# Patient Record
Sex: FEMALE | Race: OTHER | HISPANIC OR LATINO | Employment: FULL TIME | URBAN - NONMETROPOLITAN AREA
[De-identification: names, ages, dates, MRNs, and addresses within clinical notes are randomized per-mention and may not be internally consistent; named-entity substitution may affect disease eponyms.]

---

## 2023-08-30 PROBLEM — D25.9 FIBROID, UTERINE: Status: ACTIVE | Noted: 2023-08-30

## 2023-08-30 PROBLEM — R92.8 ABNORMALITY OF LEFT BREAST ON SCREENING MAMMOGRAM: Status: ACTIVE | Noted: 2023-08-30

## 2023-08-30 PROBLEM — K76.9 LIVER LESION: Status: ACTIVE | Noted: 2021-03-19

## 2023-08-30 PROBLEM — R20.2 PARESTHESIA OF UPPER EXTREMITY: Status: ACTIVE | Noted: 2023-08-30

## 2023-08-30 PROBLEM — C7A.8 NEUROENDOCRINE CANCER (MULTI): Status: ACTIVE | Noted: 2023-08-30

## 2023-08-30 PROBLEM — Z98.890 H/O ABDOMINOPLASTY: Status: ACTIVE | Noted: 2021-03-19

## 2023-08-30 PROBLEM — M50.20 HERNIATED CERVICAL DISC: Status: ACTIVE | Noted: 2023-08-30

## 2023-08-30 PROBLEM — Q84.5: Status: ACTIVE | Noted: 2023-08-30

## 2023-08-30 PROBLEM — R63.5 ABNORMAL WEIGHT GAIN: Status: ACTIVE | Noted: 2023-08-30

## 2023-08-30 PROBLEM — E89.89 POSTOPERATIVE HYPOVOLEMIA: Status: ACTIVE | Noted: 2023-08-30

## 2023-08-30 PROBLEM — C7B.8 METASTATIC MALIGNANT NEUROENDOCRINE TUMOR TO LIVER (MULTI): Status: ACTIVE | Noted: 2023-08-30

## 2023-08-30 PROBLEM — Z98.1 S/P CERVICAL SPINAL FUSION: Status: ACTIVE | Noted: 2023-08-30

## 2023-08-30 PROBLEM — G89.29 CHRONIC RIGHT UPPER QUADRANT PAIN: Status: ACTIVE | Noted: 2020-07-06

## 2023-08-30 PROBLEM — R63.0 LOSS OF APPETITE: Status: ACTIVE | Noted: 2021-03-19

## 2023-08-30 PROBLEM — N17.9 AKI (ACUTE KIDNEY INJURY) (CMS-HCC): Status: ACTIVE | Noted: 2023-08-30

## 2023-08-30 PROBLEM — K91.89 BILE LEAK, POSTOPERATIVE: Status: ACTIVE | Noted: 2023-08-30

## 2023-08-30 PROBLEM — D62 POSTOPERATIVE ANEMIA DUE TO ACUTE BLOOD LOSS: Status: ACTIVE | Noted: 2023-08-30

## 2023-08-30 PROBLEM — Z85.89 HISTORY OF NEUROENDOCRINE CANCER: Status: ACTIVE | Noted: 2023-08-30

## 2023-08-30 PROBLEM — C75.9 ENDOCRINE CANCER (MULTI): Status: ACTIVE | Noted: 2023-08-30

## 2023-08-30 PROBLEM — R10.9 ABDOMINAL PAIN: Status: ACTIVE | Noted: 2023-08-30

## 2023-08-30 PROBLEM — R73.09 ABNORMAL GLUCOSE: Status: ACTIVE | Noted: 2023-08-30

## 2023-08-30 PROBLEM — R16.0 LIVER MASS: Status: ACTIVE | Noted: 2023-08-30

## 2023-08-30 PROBLEM — R63.5 WEIGHT INCREASE: Status: ACTIVE | Noted: 2023-08-30

## 2023-08-30 PROBLEM — E86.1 POSTOPERATIVE HYPOVOLEMIA: Status: ACTIVE | Noted: 2023-08-30

## 2023-08-30 PROBLEM — M54.10 RADICULOPATHY: Status: ACTIVE | Noted: 2023-08-30

## 2023-08-30 PROBLEM — R94.6 ABNORMAL THYROID FUNCTION TEST: Status: ACTIVE | Noted: 2023-08-30

## 2023-08-30 PROBLEM — R10.11 CHRONIC RIGHT UPPER QUADRANT PAIN: Status: ACTIVE | Noted: 2020-07-06

## 2023-08-30 PROBLEM — K83.8 BILE LEAK, POSTOPERATIVE: Status: ACTIVE | Noted: 2023-08-30

## 2023-08-30 PROBLEM — C44.92 SCC (SQUAMOUS CELL CARCINOMA): Status: ACTIVE | Noted: 2023-08-30

## 2023-08-30 RX ORDER — LANREOTIDE ACETATE 120 MG/.5ML
INJECTION SUBCUTANEOUS
COMMUNITY
End: 2024-05-14 | Stop reason: WASHOUT

## 2023-08-30 RX ORDER — IBUPROFEN 200 MG
1 TABLET ORAL 3 TIMES DAILY PRN
COMMUNITY

## 2023-08-30 RX ORDER — FUROSEMIDE 20 MG/1
1 TABLET ORAL DAILY
COMMUNITY
Start: 2022-02-09 | End: 2023-10-20 | Stop reason: SDUPTHER

## 2023-08-30 RX ORDER — AMOXICILLIN 250 MG
CAPSULE ORAL
COMMUNITY
Start: 2021-12-08 | End: 2023-12-04 | Stop reason: WASHOUT

## 2023-08-30 RX ORDER — ONDANSETRON 4 MG/1
TABLET, FILM COATED ORAL
COMMUNITY
Start: 2021-01-19 | End: 2023-10-12 | Stop reason: SDUPTHER

## 2023-08-30 RX ORDER — FEXOFENADINE HCL AND PSEUDOEPHEDRINE HCI 60; 120 MG/1; MG/1
1 TABLET, EXTENDED RELEASE ORAL AS NEEDED
COMMUNITY
End: 2023-12-04 | Stop reason: WASHOUT

## 2023-08-30 RX ORDER — POLYETHYLENE GLYCOL 3350 17 G/17G
POWDER, FOR SOLUTION ORAL DAILY PRN
COMMUNITY
Start: 2021-06-24 | End: 2023-12-04 | Stop reason: WASHOUT

## 2023-08-30 RX ORDER — ALBUTEROL SULFATE 90 UG/1
AEROSOL, METERED RESPIRATORY (INHALATION)
COMMUNITY
Start: 2021-04-09 | End: 2023-12-04 | Stop reason: WASHOUT

## 2023-09-26 DIAGNOSIS — C7A.8 NEUROENDOCRINE MALIGNANCY (MULTI): Primary | ICD-10-CM

## 2023-09-26 RX ORDER — LANREOTIDE ACETATE 120 MG/.5ML
120 INJECTION SUBCUTANEOUS
OUTPATIENT
Start: 2023-10-06 | End: 2024-09-06

## 2023-10-02 DIAGNOSIS — C7A.8 NEUROENDOCRINE CANCER (MULTI): Primary | ICD-10-CM

## 2023-10-02 RX ORDER — ALBUTEROL SULFATE 0.83 MG/ML
3 SOLUTION RESPIRATORY (INHALATION) AS NEEDED
Status: CANCELLED | OUTPATIENT
Start: 2023-10-06

## 2023-10-02 RX ORDER — FAMOTIDINE 10 MG/ML
20 INJECTION INTRAVENOUS ONCE AS NEEDED
Status: CANCELLED | OUTPATIENT
Start: 2023-10-06

## 2023-10-02 RX ORDER — DIPHENHYDRAMINE HYDROCHLORIDE 50 MG/ML
50 INJECTION INTRAMUSCULAR; INTRAVENOUS AS NEEDED
Status: CANCELLED | OUTPATIENT
Start: 2023-10-06

## 2023-10-02 RX ORDER — EPINEPHRINE 0.3 MG/.3ML
0.3 INJECTION SUBCUTANEOUS EVERY 5 MIN PRN
Status: CANCELLED | OUTPATIENT
Start: 2023-10-06

## 2023-10-02 RX ORDER — LANREOTIDE ACETATE 120 MG/.5ML
120 INJECTION SUBCUTANEOUS ONCE
Status: CANCELLED | OUTPATIENT
Start: 2023-10-06

## 2023-10-06 ENCOUNTER — INFUSION (OUTPATIENT)
Dept: HEMATOLOGY/ONCOLOGY | Facility: HOSPITAL | Age: 43
End: 2023-10-06
Payer: COMMERCIAL

## 2023-10-06 ENCOUNTER — LAB (OUTPATIENT)
Dept: LAB | Facility: LAB | Age: 43
End: 2023-10-06
Payer: COMMERCIAL

## 2023-10-06 VITALS
OXYGEN SATURATION: 97 % | SYSTOLIC BLOOD PRESSURE: 123 MMHG | HEIGHT: 67 IN | RESPIRATION RATE: 18 BRPM | DIASTOLIC BLOOD PRESSURE: 83 MMHG | TEMPERATURE: 97.5 F | WEIGHT: 222.88 LBS | HEART RATE: 72 BPM | BODY MASS INDEX: 34.98 KG/M2

## 2023-10-06 DIAGNOSIS — C7A.8 NEUROENDOCRINE CANCER (MULTI): ICD-10-CM

## 2023-10-06 DIAGNOSIS — C7A.8 OTHER MALIGNANT NEUROENDOCRINE TUMORS (MULTI): Primary | ICD-10-CM

## 2023-10-06 LAB
ALBUMIN SERPL BCP-MCNC: 4.1 G/DL (ref 3.4–5)
ALP SERPL-CCNC: 73 U/L (ref 33–110)
ALT SERPL W P-5'-P-CCNC: 19 U/L (ref 7–45)
ANION GAP SERPL CALC-SCNC: 11 MMOL/L (ref 10–20)
AST SERPL W P-5'-P-CCNC: 19 U/L (ref 9–39)
BASOPHILS # BLD AUTO: 0.06 X10*3/UL (ref 0–0.1)
BASOPHILS NFR BLD AUTO: 0.8 %
BILIRUB SERPL-MCNC: 0.4 MG/DL (ref 0–1.2)
BUN SERPL-MCNC: 15 MG/DL (ref 6–23)
CALCIUM SERPL-MCNC: 9.1 MG/DL (ref 8.6–10.3)
CHLORIDE SERPL-SCNC: 105 MMOL/L (ref 98–107)
CO2 SERPL-SCNC: 25 MMOL/L (ref 21–32)
CREAT SERPL-MCNC: 0.77 MG/DL (ref 0.5–1.05)
EOSINOPHIL # BLD AUTO: 0.15 X10*3/UL (ref 0–0.7)
EOSINOPHIL NFR BLD AUTO: 2 %
ERYTHROCYTE [DISTWIDTH] IN BLOOD BY AUTOMATED COUNT: 12.9 % (ref 11.5–14.5)
GFR SERPL CREATININE-BSD FRML MDRD: >90 ML/MIN/1.73M*2
GLUCOSE SERPL-MCNC: 112 MG/DL (ref 74–99)
HCT VFR BLD AUTO: 44.1 % (ref 36–46)
HGB BLD-MCNC: 14.6 G/DL (ref 12–16)
IMM GRANULOCYTES # BLD AUTO: 0.02 X10*3/UL (ref 0–0.7)
IMM GRANULOCYTES NFR BLD AUTO: 0.3 % (ref 0–0.9)
LYMPHOCYTES # BLD AUTO: 2.53 X10*3/UL (ref 1.2–4.8)
LYMPHOCYTES NFR BLD AUTO: 33.8 %
MCH RBC QN AUTO: 30.4 PG (ref 26–34)
MCHC RBC AUTO-ENTMCNC: 33.1 G/DL (ref 32–36)
MCV RBC AUTO: 92 FL (ref 80–100)
MONOCYTES # BLD AUTO: 0.56 X10*3/UL (ref 0.1–1)
MONOCYTES NFR BLD AUTO: 7.5 %
NEUTROPHILS # BLD AUTO: 4.16 X10*3/UL (ref 1.2–7.7)
NEUTROPHILS NFR BLD AUTO: 55.6 %
NRBC BLD-RTO: 0 /100 WBCS (ref 0–0)
PLATELET # BLD AUTO: 229 X10*3/UL (ref 150–450)
PMV BLD AUTO: 11.1 FL (ref 7.5–11.5)
POTASSIUM SERPL-SCNC: 4.1 MMOL/L (ref 3.5–5.3)
PROT SERPL-MCNC: 7.3 G/DL (ref 6.4–8.2)
RBC # BLD AUTO: 4.8 X10*6/UL (ref 4–5.2)
SODIUM SERPL-SCNC: 137 MMOL/L (ref 136–145)
WBC # BLD AUTO: 7.5 X10*3/UL (ref 4.4–11.3)

## 2023-10-06 PROCEDURE — 96372 THER/PROPH/DIAG INJ SC/IM: CPT

## 2023-10-06 PROCEDURE — 36415 COLL VENOUS BLD VENIPUNCTURE: CPT

## 2023-10-06 PROCEDURE — 85025 COMPLETE CBC W/AUTO DIFF WBC: CPT

## 2023-10-06 PROCEDURE — 2500000004 HC RX 250 GENERAL PHARMACY W/ HCPCS (ALT 636 FOR OP/ED): Mod: JZ | Performed by: NURSE PRACTITIONER

## 2023-10-06 PROCEDURE — 80053 COMPREHEN METABOLIC PANEL: CPT

## 2023-10-06 PROCEDURE — 83497 ASSAY OF 5-HIAA: CPT

## 2023-10-06 RX ORDER — EPINEPHRINE 0.3 MG/.3ML
0.3 INJECTION SUBCUTANEOUS EVERY 5 MIN PRN
Status: CANCELLED | OUTPATIENT
Start: 2023-11-03

## 2023-10-06 RX ORDER — LANREOTIDE ACETATE 120 MG/.5ML
120 INJECTION SUBCUTANEOUS ONCE
Status: CANCELLED | OUTPATIENT
Start: 2023-11-03

## 2023-10-06 RX ORDER — FAMOTIDINE 10 MG/ML
20 INJECTION INTRAVENOUS ONCE AS NEEDED
Status: CANCELLED | OUTPATIENT
Start: 2023-11-03

## 2023-10-06 RX ORDER — DIPHENHYDRAMINE HYDROCHLORIDE 50 MG/ML
50 INJECTION INTRAMUSCULAR; INTRAVENOUS AS NEEDED
Status: CANCELLED | OUTPATIENT
Start: 2023-11-03

## 2023-10-06 RX ORDER — LANREOTIDE ACETATE 120 MG/.5ML
120 INJECTION SUBCUTANEOUS ONCE
Status: COMPLETED | OUTPATIENT
Start: 2023-10-06 | End: 2023-10-06

## 2023-10-06 RX ORDER — ALBUTEROL SULFATE 0.83 MG/ML
3 SOLUTION RESPIRATORY (INHALATION) AS NEEDED
Status: CANCELLED | OUTPATIENT
Start: 2023-11-03

## 2023-10-06 RX ADMIN — LANREOTIDE ACETATE 120 MG: 120 INJECTION SUBCUTANEOUS at 12:01

## 2023-10-06 ASSESSMENT — PAIN SCALES - GENERAL: PAINLEVEL: 7

## 2023-10-12 ENCOUNTER — TELEPHONE (OUTPATIENT)
Dept: HEMATOLOGY/ONCOLOGY | Facility: HOSPITAL | Age: 43
End: 2023-10-12
Payer: COMMERCIAL

## 2023-10-12 DIAGNOSIS — C7A.8 NEUROENDOCRINE CANCER (MULTI): Primary | ICD-10-CM

## 2023-10-12 RX ORDER — ONDANSETRON 4 MG/1
8 TABLET, FILM COATED ORAL EVERY 8 HOURS PRN
Qty: 20 TABLET | Refills: 3 | Status: SHIPPED | OUTPATIENT
Start: 2023-10-12 | End: 2023-11-11

## 2023-10-14 ENCOUNTER — HOSPITAL ENCOUNTER (OUTPATIENT)
Dept: RADIOLOGY | Facility: HOSPITAL | Age: 43
Discharge: HOME | End: 2023-10-14
Payer: COMMERCIAL

## 2023-10-14 DIAGNOSIS — C7A.8 OTHER MALIGNANT NEUROENDOCRINE TUMORS (MULTI): ICD-10-CM

## 2023-10-14 PROCEDURE — 74183 MRI ABD W/O CNTR FLWD CNTR: CPT | Performed by: STUDENT IN AN ORGANIZED HEALTH CARE EDUCATION/TRAINING PROGRAM

## 2023-10-14 PROCEDURE — 72197 MRI PELVIS W/O & W/DYE: CPT

## 2023-10-14 PROCEDURE — 74183 MRI ABD W/O CNTR FLWD CNTR: CPT

## 2023-10-14 PROCEDURE — 2550000001 HC RX 255 CONTRASTS: Performed by: NURSE PRACTITIONER

## 2023-10-14 PROCEDURE — 72197 MRI PELVIS W/O & W/DYE: CPT | Performed by: STUDENT IN AN ORGANIZED HEALTH CARE EDUCATION/TRAINING PROGRAM

## 2023-10-14 PROCEDURE — A9575 INJ GADOTERATE MEGLUMI 0.1ML: HCPCS | Performed by: NURSE PRACTITIONER

## 2023-10-14 RX ORDER — GADOTERATE MEGLUMINE 376.9 MG/ML
20 INJECTION INTRAVENOUS
Status: COMPLETED | OUTPATIENT
Start: 2023-10-14 | End: 2023-10-14

## 2023-10-14 RX ADMIN — GADOTERATE MEGLUMINE 20 ML: 376.9 INJECTION INTRAVENOUS at 11:03

## 2023-10-17 LAB — 5OH-INDOLEACETATE SERPL-MCNC: 6.2 NG/ML

## 2023-10-20 ENCOUNTER — OFFICE VISIT (OUTPATIENT)
Dept: HEMATOLOGY/ONCOLOGY | Facility: HOSPITAL | Age: 43
End: 2023-10-20
Payer: COMMERCIAL

## 2023-10-20 VITALS
SYSTOLIC BLOOD PRESSURE: 124 MMHG | OXYGEN SATURATION: 99 % | TEMPERATURE: 96.6 F | DIASTOLIC BLOOD PRESSURE: 80 MMHG | HEART RATE: 79 BPM | BODY MASS INDEX: 35.99 KG/M2 | RESPIRATION RATE: 18 BRPM | WEIGHT: 223 LBS

## 2023-10-20 DIAGNOSIS — C7B.8 METASTATIC MALIGNANT NEUROENDOCRINE TUMOR TO LIVER (MULTI): Primary | ICD-10-CM

## 2023-10-20 DIAGNOSIS — R60.0 BILATERAL LEG EDEMA: ICD-10-CM

## 2023-10-20 DIAGNOSIS — C7A.8 NEUROENDOCRINE CANCER (MULTI): Primary | ICD-10-CM

## 2023-10-20 PROCEDURE — 99215 OFFICE O/P EST HI 40 MIN: CPT | Performed by: NURSE PRACTITIONER

## 2023-10-20 PROCEDURE — 1036F TOBACCO NON-USER: CPT | Performed by: NURSE PRACTITIONER

## 2023-10-20 RX ORDER — FUROSEMIDE 20 MG/1
20 TABLET ORAL DAILY PRN
Qty: 30 TABLET | Refills: 0 | Status: SHIPPED | OUTPATIENT
Start: 2023-10-20 | End: 2024-01-05

## 2023-10-20 ASSESSMENT — PAIN SCALES - GENERAL: PAINLEVEL: 0-NO PAIN

## 2023-10-20 NOTE — PROGRESS NOTES
Patient ID: Brooke Davenport is a 43 y.o. female.  Visit type: Follow up visit      Current Therapy    Treatment Plan:  Lanreotide, Every 28 Days      ONCOLOGIC HISTORY    42-year-old woman with a several year history of neck pain on the Rt side and for exploration they did full imaging include CT neck, chest and AP on 06/30/2020  which showed a large lobulated heterogeneously enhancing mass lesion involving segments 7 and 8 of the right hepatic lobe measuring approximately 13.2 x 10.5 x 9.7 cm in size. 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   MRI showed large hypervascular heterogeneously enhancing hepatic mass lesion in segment 7 and 8 and multiple other satellite hepatic lesions involving right and left hepatic lobes. Stable subcentimeter zayda hepatis lymph nodes. No other significant lymphadenopathy  or free intraperitoneal fluid.  PET-NET showed Dominant centrally necrotic somatostatin receptor expressing mass within hepatic segments 7 and 8. Additional satellite lesions in the bilateral hepatic lobes as described above. Somatostatin avid Left internal iliac lymph nodes. No additional  sites of abnormal Ga-68 dotatate uptake suggestive of malignancy identified.  PET-FDG didn't show any extra glucose uptake  Colonoscopy and SB enteroscopy didn't show any primary lesion. Symptoms of diarrhea and intermittent flushing s/p 1 dose of lanreotide after which she had severe bloating and abdominal pain as well as diarrhea   10/27: She had partial hepatectomy (segment 3, 5, 6), liver ablation (segment 5, 3), cholecystectomy   She recovered well with surgery. She was tested positive for COVID  and only symptoms she has was loss of taste and stuffy nose.   She started on lanreotide but d/t multiple side effects including gas, bloating, and some hypoglycemia episodes she was switched  to Octreotide 30 mg in December of 2021  MRI abd/pelvis 1/7/2021 appears stable but limited imaging d/t inability to do with contrast as IV infiltrated during scan per patient.     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 female who presents for follow up and to review scans. She reports ongoing swelling in her legs and has not taken lasix in a while. She is requesting a refill of lasix. She states her headaches have improved since getting her wisdom teeth extracted and takes allegra as needed. She has noticed increased nausea that is managed with zofran, no emesis. Her appetite is not great but she is eating enough to maintain her weight. She has intermittent diarrhea and formed stool. She denies pain, no fever or chills, no shortness of breath or chest pain. She is concerned about stable lesion noted on left side of S1 vertebral body as it was not previously mentioned.    Review of Systems   All other systems reviewed and are negative.      OBJECTIVE:    VS:  There were no vitals taken for this visit.  Weight: There were no vitals filed for this visit.    BSA: There is no height or weight on file to calculate BSA.    Physical Exam  Vitals reviewed.   Constitutional:       General: She is not in acute distress.     Appearance: Normal appearance.   HENT:      Head: Normocephalic and atraumatic.      Nose: Nose normal.      Mouth/Throat:      Mouth: Mucous membranes are moist.      Pharynx: Oropharynx is clear.   Eyes:      Extraocular Movements: Extraocular movements intact.      Conjunctiva/sclera: Conjunctivae normal.   Cardiovascular:      Rate and Rhythm: Normal rate and regular rhythm.      Pulses: Normal pulses.      Heart sounds: Normal heart sounds.   Pulmonary:      Effort: Pulmonary effort is normal.      Breath sounds: Normal breath sounds.   Abdominal:      General:  Abdomen is flat. Bowel sounds are normal.      Palpations: Abdomen is soft.   Musculoskeletal:         General: Swelling present. Normal range of motion.      Cervical back: Normal range of motion and neck supple.      Comments: +1-2 pitting edema to bilateral legs   Skin:     General: Skin is warm and dry.   Neurological:      General: No focal deficit present.      Mental Status: She is alert and oriented to person, place, and time. Mental status is at baseline.   Psychiatric:         Mood and Affect: Mood normal.         Behavior: Behavior normal.         Thought Content: Thought content normal.         Judgment: Judgment normal.           Diagnostic Results     Lab on 10/06/2023   Component Date Value    5-Hydroxyindoleacetic Ac* 10/06/2023 6.2     WBC 10/06/2023 7.5     nRBC 10/06/2023 0.0     RBC 10/06/2023 4.80     Hemoglobin 10/06/2023 14.6     Hematocrit 10/06/2023 44.1     MCV 10/06/2023 92     MCH 10/06/2023 30.4     MCHC 10/06/2023 33.1     RDW 10/06/2023 12.9     Platelets 10/06/2023 229     MPV 10/06/2023 11.1     Neutrophils % 10/06/2023 55.6     Immature Granulocytes %,* 10/06/2023 0.3     Lymphocytes % 10/06/2023 33.8     Monocytes % 10/06/2023 7.5     Eosinophils % 10/06/2023 2.0     Basophils % 10/06/2023 0.8     Neutrophils Absolute 10/06/2023 4.16     Immature Granulocytes Ab* 10/06/2023 0.02     Lymphocytes Absolute 10/06/2023 2.53     Monocytes Absolute 10/06/2023 0.56     Eosinophils Absolute 10/06/2023 0.15     Basophils Absolute 10/06/2023 0.06     Glucose 10/06/2023 112 (H)     Sodium 10/06/2023 137     Potassium 10/06/2023 4.1     Chloride 10/06/2023 105     Bicarbonate 10/06/2023 25     Anion Gap 10/06/2023 11     Urea Nitrogen 10/06/2023 15     Creatinine 10/06/2023 0.77     eGFR 10/06/2023 >90     Calcium 10/06/2023 9.1     Albumin 10/06/2023 4.1     Alkaline Phosphatase 10/06/2023 73     Total Protein 10/06/2023 7.3     AST 10/06/2023 19     Bilirubin, Total 10/06/2023 0.4     ALT  10/06/2023 19            MR pelvis w and wo IV contrast  Narrative: Interpreted By:  Geraldo Obando,   STUDY:  MR PELVIS W AND WO IV CONTRAST;  10/14/2023 10:59 am      INDICATION:  Signs/Symptoms: Neuroendocrine neoplasm on treatment, restaging scans  Other malignant neuroendocrine tumors C7A.8: Neuroendocrine cancer.      COMPARISON:  Pelvis MRI dated 07/03/2023      ACCESSION NUMBER(S):  [id number]      ORDERING CLINICIAN:  SKYLA GIMENEZ      TECHNIQUE:  Multiplanar MRI of the pelvis was obtained including the following  sequences: T2 weighted SSFSE, T2 FSE with and without fat saturation,  pre and post gadolinium dynamic T1 GRE.  20 ml of Gadolinium contrast agent Dotarem were administered  intravenously without complication.      FINDINGS:  RECTOSIGMOID AND ANAL CANAL:  The anal canal appears normal without evidence of fistula or masses.      BLADDER:  Within normal limits      REPRODUCTIVE ORGANS:  Uterus is unremarkable      PELVIC LYMPH NODES:  No enlarged pelvic lymph nodes.      PERITONEUM/RETROPERITONEUM:  Trace pelvic ascites.      BONES AND PELVIC WALL:  No destructive osseous lesions. Stable indeterminate faint T2  hypointense enhancing lesion in the left side of S1 vertebral body  measuring 1.4 cm (series 10, image 43/series 24, image 22) gluteal  injection granulomas noted.      Impression: 1. No metastatic disease in the pelvis.  2. Stable indeterminate enhancing focus in the left side of S1  vertebral body, stable since at least 01/07/2022 and favors benign  given long-term stability.  3. Stable trace pelvic ascites.          MACRO:  None      Signed by: Geraldo Obando 10/16/2023 7:56 AM  Dictation workstation:   [id number]  MR abdomen w and wo IV contrast  Narrative: Interpreted By:  Geraldo Obando,   STUDY:  MR ABDOMEN W AND WO IV CONTRAST;  10/14/2023 10:59 am      INDICATION:  Signs/Symptoms: Neuroendocrine neoplasm on treatment, restaging scans  Other malignant neuroendocrine tumors C7A.8:  Neuroendocrine cancer.      COMPARISON:  Abdomen MRI dated 07/03/2023      ACCESSION NUMBER(S):  [id number]      ORDERING CLINICIAN:  SKYLA GIMENEZ      TECHNIQUE:  MRI LIVER; Multiplanar magnetic resonance images of the abdomen were  obtained including the following sequences; T2-weighted SSFSE with  and without fat saturation, T1-weighted GRE in/opposed phase, DWI,  fat saturated 3D-T1w GRE pre and dynamically post contrast.  20 ml of  Gadolinium contrast agent Dotarem were administered intravenously  without immediate complication.      FINDINGS:  LIVER:  Status post partial right hepatectomy. Stable small cystic focus of  the liver dome. Stable few arterial enhancing foci, subcapsular  segment 8 measuring 1 cm (image 10), segment 7 measuring 1 cm (image  8) and centrally in segment 5 measuring 0.8 cm (image 24)      BILE DUCTS:  No intra or extrahepatic biliary dilatation.      GALLBLADDER:  Surgically absent      PANCREAS:  Normal signal intensity. Normal enhancement. No masses. The  pancreatic duct is normal.      SPLEEN:  The spleen is normal in size without evidence of focal lesions.      ADRENAL GLANDS:  No nodules.      KIDNEYS:  Both kidneys are normal in size and cortical enhancement. Stable left  cortical simple cysts. No solid masses.      LYMPH NODES:  No enlarged lymphadenopathy.      ABDOMINAL VESSELS:  Aorta and the major abdominal arterial vessels demonstrate no gross  abnormality.  Superior mesenteric vein, splenic vein, and main, right  and left portal vein are patent. Hepatic veins are patent.      BOWEL:  No bowel dilatation.      PERITONEUM/RETROPERITONEUM:  No ascites. Bilateral gluteal injection granulomas.      BONES AND LOWER THORAX:  No destructive osseous lesions. The visualized lower lung fields are  unremarkable. The heart is normal in size.      Impression: 1. Status post partial right hepatectomy. Stable indeterminate  hepatic arterial enhancing foci as detailed above. No new  suspicious  lesions.          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 The possibility of origin in pancreas and duodenum should be excluded.   MRI showed large hypervascular heterogeneously enhancing hepatic mass lesion in segment 7 and 8 and multiple other satellite hepatic lesions involving right and left hepatic lobes. Stable subcentimeter zayda hepatis lymph nodes. No other significant lymphadenopathy  or free intraperitoneal fluid.  PET-NET showed Dominant centrally necrotic somatostatin receptor expressing mass within hepatic segments 7 and 8. Additional satellite lesions in the bilateral hepatic lobes as described above. Somatostatin avid Left internal iliac lymph nodes. No additional  sites of abnormal Ga-68 dotatate uptake suggestive of malignancy identified.  PET-FDG didnt show any extra glucose uptake  Colonoscopy and SB enteroscopy didn't show any primary lesion  10/27/2020: She had partial hepatectomy (segment 3, 5, 6), liver ablation (segment 5, 3), cholecystectomy and pth confirmed G2 well diff NET (kI67 3.1%)    She recovered well with surgery. She was tested positive for COVID  and only symptoms she has was loss of taste and stuffy nose.   04/26/2021: Restaging CT scan showed post surgical changes and postablation changes in the liver with no new lesions  MRI  "showed At least 4 foci of restricted diffusion are identified in segments 8 and 3 without correlating abnormality on other sequences. However 2 of these foci correlate with hypervascular foci and a prior CT scan.  Findings are nonspecific, however  metastatic foci cannot be excluded  MRI showed stable disease on 08/16/2021  PET-Ga68 on 11/2021 showed multiple small (3-4) somatostatin avid foci are noted scattered throughout the hepatic  parenchyma which appear stable number and relative intensity compared to the prior exam   Octreotide 30 mg started 12/2021 as she was not tolerating lanreotide well with multiple side effects.  MRI Brain 12/13 done for recurrent headaches/ to rule out mets- Scan WNL  MRI abd/pelvis 1/7/2021 appears stable but limited imaging d/t inability to do with contrast as IV infiltrated during scan per patient.   Repeated MRI on 03/2022: Showed stable disease with one new liver lesion   Repeated MRI on 07/23/2022: Showed stable disease   PET scan on 10/14/2022 shows stable disease  MRI AP on 3/6/2023 shows stable disease. 5HIAA 3.3 on 3/29/23  MRI AP on 7/3/2023 shows stable disease  MRI AP on 10/14/23 shows: \"Status post partial right hepatectomy. Stable indeterminate  hepatic arterial enhancing foci as detailed above. No new suspicious lesions.\"     Plan:    -Patient has no  new complaints  -Reviewed scan results with patient which shows SD  -Email sent to radiology Geraldo Perez for clarification on S1 vertebral lesion seen on scan  -Continue monthly SSA with lanreotide  -Repeat MRI AP ordered in 3 months (ordered)  -Return to clinic after scans for follow-up and to discuss results       LE edema:  -7/21/23 Echo with no concerning findings, she will follow up with cardiology   -Refill for lasix 20 mg daily as needed for swelling sent to pharmacy    Headaches: Improved  -7/21/23 MRI Brain with no metastatic disease, she will follow up with neurology for frequent headaches      Eva MOON" Masha, APRN-CNP  10/20/23

## 2023-11-03 ENCOUNTER — TELEPHONE (OUTPATIENT)
Dept: HEMATOLOGY/ONCOLOGY | Facility: HOSPITAL | Age: 43
End: 2023-11-03
Payer: COMMERCIAL

## 2023-11-03 ENCOUNTER — INFUSION (OUTPATIENT)
Dept: HEMATOLOGY/ONCOLOGY | Facility: HOSPITAL | Age: 43
End: 2023-11-03
Payer: COMMERCIAL

## 2023-11-03 VITALS
HEART RATE: 79 BPM | OXYGEN SATURATION: 97 % | TEMPERATURE: 97.9 F | DIASTOLIC BLOOD PRESSURE: 85 MMHG | SYSTOLIC BLOOD PRESSURE: 130 MMHG | RESPIRATION RATE: 17 BRPM

## 2023-11-03 DIAGNOSIS — C7A.8 NEUROENDOCRINE CANCER (MULTI): ICD-10-CM

## 2023-11-03 PROCEDURE — 2500000004 HC RX 250 GENERAL PHARMACY W/ HCPCS (ALT 636 FOR OP/ED): Mod: JZ | Performed by: NURSE PRACTITIONER

## 2023-11-03 PROCEDURE — 96372 THER/PROPH/DIAG INJ SC/IM: CPT

## 2023-11-03 RX ORDER — LANREOTIDE ACETATE 120 MG/.5ML
120 INJECTION SUBCUTANEOUS ONCE
Status: COMPLETED | OUTPATIENT
Start: 2023-11-03 | End: 2023-11-03

## 2023-11-03 RX ORDER — LANREOTIDE ACETATE 120 MG/.5ML
120 INJECTION SUBCUTANEOUS ONCE
Status: CANCELLED | OUTPATIENT
Start: 2023-12-01

## 2023-11-03 RX ORDER — EPINEPHRINE 0.3 MG/.3ML
0.3 INJECTION SUBCUTANEOUS EVERY 5 MIN PRN
Status: CANCELLED | OUTPATIENT
Start: 2023-12-01

## 2023-11-03 RX ORDER — ALBUTEROL SULFATE 0.83 MG/ML
3 SOLUTION RESPIRATORY (INHALATION) AS NEEDED
Status: CANCELLED | OUTPATIENT
Start: 2023-12-01

## 2023-11-03 RX ORDER — DIPHENHYDRAMINE HYDROCHLORIDE 50 MG/ML
50 INJECTION INTRAMUSCULAR; INTRAVENOUS AS NEEDED
Status: CANCELLED | OUTPATIENT
Start: 2023-12-01

## 2023-11-03 RX ORDER — FAMOTIDINE 10 MG/ML
20 INJECTION INTRAVENOUS ONCE AS NEEDED
Status: CANCELLED | OUTPATIENT
Start: 2023-12-01

## 2023-11-03 RX ADMIN — LANREOTIDE ACETATE 120 MG: 120 INJECTION SUBCUTANEOUS at 12:18

## 2023-11-03 ASSESSMENT — PAIN SCALES - GENERAL: PAINLEVEL: 0-NO PAIN

## 2023-11-03 NOTE — TELEPHONE ENCOUNTER
Patient scheduled for injection later today at 3 PM. Patient called to request a sooner appointment. Patient is taking a half day and would like to come sooner. Moved patient to 12:30 PM. Patient verbalized understanding and agreed to time change.

## 2023-11-28 ENCOUNTER — TELEPHONE (OUTPATIENT)
Dept: PRIMARY CARE | Facility: CLINIC | Age: 43
End: 2023-11-28

## 2023-12-01 ENCOUNTER — APPOINTMENT (OUTPATIENT)
Dept: HEMATOLOGY/ONCOLOGY | Facility: CLINIC | Age: 43
End: 2023-12-01
Payer: COMMERCIAL

## 2023-12-01 ENCOUNTER — INFUSION (OUTPATIENT)
Dept: HEMATOLOGY/ONCOLOGY | Facility: HOSPITAL | Age: 43
End: 2023-12-01
Payer: COMMERCIAL

## 2023-12-01 VITALS
DIASTOLIC BLOOD PRESSURE: 77 MMHG | OXYGEN SATURATION: 96 % | TEMPERATURE: 99.1 F | RESPIRATION RATE: 18 BRPM | SYSTOLIC BLOOD PRESSURE: 119 MMHG | HEART RATE: 93 BPM

## 2023-12-01 DIAGNOSIS — C7A.8 NEUROENDOCRINE CANCER (MULTI): ICD-10-CM

## 2023-12-01 PROCEDURE — 96372 THER/PROPH/DIAG INJ SC/IM: CPT

## 2023-12-01 PROCEDURE — 2500000004 HC RX 250 GENERAL PHARMACY W/ HCPCS (ALT 636 FOR OP/ED): Mod: JZ | Performed by: NURSE PRACTITIONER

## 2023-12-01 RX ORDER — FAMOTIDINE 10 MG/ML
20 INJECTION INTRAVENOUS ONCE AS NEEDED
Status: CANCELLED | OUTPATIENT
Start: 2023-12-29

## 2023-12-01 RX ORDER — LANREOTIDE ACETATE 120 MG/.5ML
120 INJECTION SUBCUTANEOUS ONCE
Status: CANCELLED | OUTPATIENT
Start: 2023-12-29

## 2023-12-01 RX ORDER — ALBUTEROL SULFATE 0.83 MG/ML
3 SOLUTION RESPIRATORY (INHALATION) AS NEEDED
Status: CANCELLED | OUTPATIENT
Start: 2023-12-29

## 2023-12-01 RX ORDER — DIPHENHYDRAMINE HYDROCHLORIDE 50 MG/ML
50 INJECTION INTRAMUSCULAR; INTRAVENOUS AS NEEDED
Status: CANCELLED | OUTPATIENT
Start: 2023-12-29

## 2023-12-01 RX ORDER — LANREOTIDE ACETATE 120 MG/.5ML
120 INJECTION SUBCUTANEOUS ONCE
Status: COMPLETED | OUTPATIENT
Start: 2023-12-01 | End: 2023-12-01

## 2023-12-01 RX ORDER — EPINEPHRINE 0.3 MG/.3ML
0.3 INJECTION SUBCUTANEOUS EVERY 5 MIN PRN
Status: CANCELLED | OUTPATIENT
Start: 2023-12-29

## 2023-12-01 RX ADMIN — LANREOTIDE ACETATE 120 MG: 120 INJECTION SUBCUTANEOUS at 14:58

## 2023-12-01 ASSESSMENT — COLUMBIA-SUICIDE SEVERITY RATING SCALE - C-SSRS
6. HAVE YOU EVER DONE ANYTHING, STARTED TO DO ANYTHING, OR PREPARED TO DO ANYTHING TO END YOUR LIFE?: NO
1. IN THE PAST MONTH, HAVE YOU WISHED YOU WERE DEAD OR WISHED YOU COULD GO TO SLEEP AND NOT WAKE UP?: NO
2. HAVE YOU ACTUALLY HAD ANY THOUGHTS OF KILLING YOURSELF?: NO

## 2023-12-01 ASSESSMENT — PATIENT HEALTH QUESTIONNAIRE - PHQ9
1. LITTLE INTEREST OR PLEASURE IN DOING THINGS: NOT AT ALL
2. FEELING DOWN, DEPRESSED OR HOPELESS: NOT AT ALL
SUM OF ALL RESPONSES TO PHQ9 QUESTIONS 1 & 2: 0

## 2023-12-01 ASSESSMENT — PAIN SCALES - GENERAL: PAINLEVEL: 0-NO PAIN

## 2023-12-04 ENCOUNTER — TELEMEDICINE (OUTPATIENT)
Dept: PRIMARY CARE | Facility: CLINIC | Age: 43
End: 2023-12-04
Payer: COMMERCIAL

## 2023-12-04 DIAGNOSIS — R19.7 DIARRHEA, UNSPECIFIED TYPE: Primary | ICD-10-CM

## 2023-12-04 PROBLEM — I72.9 ANEURYSM (CMS-HCC): Status: ACTIVE | Noted: 2023-12-04

## 2023-12-04 PROCEDURE — 99213 OFFICE O/P EST LOW 20 MIN: CPT | Performed by: NURSE PRACTITIONER

## 2023-12-04 ASSESSMENT — PAIN SCALES - GENERAL: PAINLEVEL: 8

## 2023-12-04 NOTE — PROGRESS NOTES
With patient's permission this is a telemedicine visit with video and audio.  History Of Present Illness  Brooke Chu is a 43 y.o. female who calls in for Nausea (Dr Reid- stomach virus x 3 days-[phone]/Has been having nausea, diarrhea - orange yellow, now black/green, vomiting, weak, burping- had odor/Has been taking Pepto, has been trying to increase liquid intake.).    HPI  43 year old female pt of Dr. Reid very ill for last three days, having diarrhea that is green and black, burping a lot, she threw up phlegm yesterday.  Only eating toast and pretzels.  Gets injections for her neuroendocrine cancer every 28 days.  Not seeing blood in her stool.  States there was a big change in the stool color as of yesterday, she is going to try one day of BRAT diet and if no change in color of stool will call oncologist tomorrow, she is due for her next scan in January.      Past Medical History  She has a past medical history of Other bursal cyst, unspecified wrist.    Medications  Current Outpatient Medications   Medication Instructions    albuterol 90 mcg/actuation inhaler inhalation    furosemide (LASIX) 20 mg, oral, Daily PRN    ibuprofen 200 mg tablet 1 tablet, oral, 3 times daily PRN, with food or milk    lanreotide (Somatuline Depot) 120 mg/0.5 mL syringe subcutaneous        Surgical History  She has a past surgical history that includes Other surgical history (06/29/2020); Other surgical history (03/21/2022); Other surgical history (08/24/2022); Other surgical history (08/24/2022); Other surgical history (11/14/2020); Other surgical history (11/14/2020); Other surgical history (11/14/2020); US guided needle liver biopsy (7/23/2020); and CT guided percutaneous peritoneal or retroperitoneal fluid collection drainage (11/3/2020).     Social History  She reports that she has never smoked. She has never been exposed to tobacco smoke. She has never used smokeless tobacco. She reports that she does not drink  alcohol and does not use drugs.    Family History  Family History   Problem Relation Name Age of Onset    Other (adult respiratory distress sydrome) Mother      Other (cardiac disorder) Mother      COPD Mother      Crohn's disease Mother      Hypothyroidism Mother      Hyperthyroidism Mother      Heart attack Mother      Cerebral aneurysm Father      Hyperthyroidism Other Grandmother     Hypothyroidism Other Grandmother     Diabetes Other Grandfather     Diabetes Other Aunt     Hyperthyroidism Other Aunt     Hypothyroidism Other Aunt     Diabetes Paternal Great-Grandmother          Allergies  Methylprednisolone and Other    On video:     Appearance: Normal appearance.      Effort: Respiratory effort is normal. Speaking in full sentences. No respiratory distress.     Mood and Affect: Mood normal.         Thought Content: Thought content normal.         Judgment: Judgment normal.      Last Recorded Vitals  There were no vitals taken for this visit.  (Vitals are taken by patient at home, if any reported)    Relevant Results      Assessment/Plan   Brooke was seen today for nausea.  Diagnoses and all orders for this visit:  Diarrhea, unspecified type (Primary)          Cori Gonzalez, APRN-CNP

## 2023-12-29 ENCOUNTER — INFUSION (OUTPATIENT)
Dept: HEMATOLOGY/ONCOLOGY | Facility: HOSPITAL | Age: 43
End: 2023-12-29
Payer: COMMERCIAL

## 2023-12-29 VITALS
WEIGHT: 221.56 LBS | HEART RATE: 90 BPM | OXYGEN SATURATION: 99 % | BODY MASS INDEX: 35.76 KG/M2 | RESPIRATION RATE: 18 BRPM | SYSTOLIC BLOOD PRESSURE: 146 MMHG | TEMPERATURE: 98.4 F | DIASTOLIC BLOOD PRESSURE: 94 MMHG

## 2023-12-29 DIAGNOSIS — C7A.8 NEUROENDOCRINE CANCER (MULTI): ICD-10-CM

## 2023-12-29 PROCEDURE — 2500000004 HC RX 250 GENERAL PHARMACY W/ HCPCS (ALT 636 FOR OP/ED): Mod: JZ | Performed by: NURSE PRACTITIONER

## 2023-12-29 PROCEDURE — 96372 THER/PROPH/DIAG INJ SC/IM: CPT

## 2023-12-29 RX ORDER — EPINEPHRINE 0.3 MG/.3ML
0.3 INJECTION SUBCUTANEOUS EVERY 5 MIN PRN
Status: CANCELLED | OUTPATIENT
Start: 2024-01-26

## 2023-12-29 RX ORDER — DIPHENHYDRAMINE HYDROCHLORIDE 50 MG/ML
50 INJECTION INTRAMUSCULAR; INTRAVENOUS AS NEEDED
Status: DISCONTINUED | OUTPATIENT
Start: 2023-12-29 | End: 2023-12-29 | Stop reason: HOSPADM

## 2023-12-29 RX ORDER — LANREOTIDE ACETATE 120 MG/.5ML
120 INJECTION SUBCUTANEOUS ONCE
Status: CANCELLED | OUTPATIENT
Start: 2024-01-26

## 2023-12-29 RX ORDER — ALBUTEROL SULFATE 0.83 MG/ML
3 SOLUTION RESPIRATORY (INHALATION) AS NEEDED
Status: DISCONTINUED | OUTPATIENT
Start: 2023-12-29 | End: 2023-12-29 | Stop reason: HOSPADM

## 2023-12-29 RX ORDER — EPINEPHRINE 0.3 MG/.3ML
0.3 INJECTION SUBCUTANEOUS EVERY 5 MIN PRN
Status: DISCONTINUED | OUTPATIENT
Start: 2023-12-29 | End: 2023-12-29 | Stop reason: HOSPADM

## 2023-12-29 RX ORDER — FAMOTIDINE 10 MG/ML
20 INJECTION INTRAVENOUS ONCE AS NEEDED
Status: CANCELLED | OUTPATIENT
Start: 2024-01-26

## 2023-12-29 RX ORDER — LANREOTIDE ACETATE 120 MG/.5ML
120 INJECTION SUBCUTANEOUS ONCE
Status: COMPLETED | OUTPATIENT
Start: 2023-12-29 | End: 2023-12-29

## 2023-12-29 RX ORDER — FAMOTIDINE 10 MG/ML
20 INJECTION INTRAVENOUS ONCE AS NEEDED
Status: DISCONTINUED | OUTPATIENT
Start: 2023-12-29 | End: 2023-12-29 | Stop reason: HOSPADM

## 2023-12-29 RX ORDER — DIPHENHYDRAMINE HYDROCHLORIDE 50 MG/ML
50 INJECTION INTRAMUSCULAR; INTRAVENOUS AS NEEDED
Status: CANCELLED | OUTPATIENT
Start: 2024-01-26

## 2023-12-29 RX ORDER — ALBUTEROL SULFATE 0.83 MG/ML
3 SOLUTION RESPIRATORY (INHALATION) AS NEEDED
Status: CANCELLED | OUTPATIENT
Start: 2024-01-26

## 2023-12-29 RX ADMIN — LANREOTIDE ACETATE 120 MG: 120 INJECTION SUBCUTANEOUS at 13:39

## 2023-12-29 ASSESSMENT — PATIENT HEALTH QUESTIONNAIRE - PHQ9
SUM OF ALL RESPONSES TO PHQ9 QUESTIONS 1 & 2: 0
1. LITTLE INTEREST OR PLEASURE IN DOING THINGS: NOT AT ALL
2. FEELING DOWN, DEPRESSED OR HOPELESS: NOT AT ALL

## 2023-12-29 ASSESSMENT — PAIN SCALES - GENERAL: PAINLEVEL: 0-NO PAIN

## 2024-01-02 ENCOUNTER — HOSPITAL ENCOUNTER (OUTPATIENT)
Dept: RADIOLOGY | Facility: HOSPITAL | Age: 44
Discharge: HOME | End: 2024-01-02
Payer: COMMERCIAL

## 2024-01-02 DIAGNOSIS — C7A.8 NEUROENDOCRINE CANCER (MULTI): ICD-10-CM

## 2024-01-02 PROCEDURE — 74183 MRI ABD W/O CNTR FLWD CNTR: CPT

## 2024-01-02 PROCEDURE — 72197 MRI PELVIS W/O & W/DYE: CPT | Performed by: RADIOLOGY

## 2024-01-02 PROCEDURE — 74183 MRI ABD W/O CNTR FLWD CNTR: CPT | Performed by: RADIOLOGY

## 2024-01-02 PROCEDURE — 2550000001 HC RX 255 CONTRASTS: Performed by: NURSE PRACTITIONER

## 2024-01-02 PROCEDURE — A9575 INJ GADOTERATE MEGLUMI 0.1ML: HCPCS | Performed by: NURSE PRACTITIONER

## 2024-01-02 PROCEDURE — 72197 MRI PELVIS W/O & W/DYE: CPT

## 2024-01-02 RX ORDER — GADOTERATE MEGLUMINE 376.9 MG/ML
0.2 INJECTION INTRAVENOUS
Status: COMPLETED | OUTPATIENT
Start: 2024-01-02 | End: 2024-01-02

## 2024-01-02 RX ADMIN — GADOTERATE MEGLUMINE 20 ML: 376.9 INJECTION INTRAVENOUS at 08:15

## 2024-01-05 ENCOUNTER — TELEMEDICINE (OUTPATIENT)
Dept: PRIMARY CARE | Facility: CLINIC | Age: 44
End: 2024-01-05
Payer: COMMERCIAL

## 2024-01-05 VITALS — WEIGHT: 220 LBS | BODY MASS INDEX: 35.36 KG/M2 | HEIGHT: 66 IN

## 2024-01-05 DIAGNOSIS — J01.40 ACUTE NON-RECURRENT PANSINUSITIS: Primary | ICD-10-CM

## 2024-01-05 PROBLEM — R73.09 ABNORMAL GLUCOSE: Status: RESOLVED | Noted: 2023-08-30 | Resolved: 2024-01-05

## 2024-01-05 PROBLEM — R92.8 ABNORMALITY OF LEFT BREAST ON SCREENING MAMMOGRAM: Status: RESOLVED | Noted: 2023-08-30 | Resolved: 2024-01-05

## 2024-01-05 PROBLEM — R63.5 ABNORMAL WEIGHT GAIN: Status: RESOLVED | Noted: 2023-08-30 | Resolved: 2024-01-05

## 2024-01-05 PROBLEM — K76.9 LIVER LESION: Status: RESOLVED | Noted: 2021-03-19 | Resolved: 2024-01-05

## 2024-01-05 PROBLEM — R63.0 LOSS OF APPETITE: Status: RESOLVED | Noted: 2021-03-19 | Resolved: 2024-01-05

## 2024-01-05 PROBLEM — R10.9 ABDOMINAL PAIN: Status: RESOLVED | Noted: 2023-08-30 | Resolved: 2024-01-05

## 2024-01-05 PROBLEM — R94.6 ABNORMAL THYROID FUNCTION TEST: Status: RESOLVED | Noted: 2023-08-30 | Resolved: 2024-01-05

## 2024-01-05 PROBLEM — R63.5 WEIGHT INCREASE: Status: RESOLVED | Noted: 2023-08-30 | Resolved: 2024-01-05

## 2024-01-05 PROCEDURE — 99214 OFFICE O/P EST MOD 30 MIN: CPT | Performed by: FAMILY MEDICINE

## 2024-01-05 RX ORDER — AMOXICILLIN AND CLAVULANATE POTASSIUM 875; 125 MG/1; MG/1
875 TABLET, FILM COATED ORAL 2 TIMES DAILY
Qty: 14 TABLET | Refills: 0 | Status: SHIPPED | OUTPATIENT
Start: 2024-01-05 | End: 2024-01-12

## 2024-01-05 RX ORDER — BENZONATATE 100 MG/1
100 CAPSULE ORAL 3 TIMES DAILY PRN
Qty: 21 CAPSULE | Refills: 0 | Status: SHIPPED | OUTPATIENT
Start: 2024-01-05 | End: 2024-01-12

## 2024-01-05 ASSESSMENT — LIFESTYLE VARIABLES
SKIP TO QUESTIONS 9-10: 1
HOW MANY STANDARD DRINKS CONTAINING ALCOHOL DO YOU HAVE ON A TYPICAL DAY: 1 OR 2
HOW OFTEN DO YOU HAVE SIX OR MORE DRINKS ON ONE OCCASION: NEVER
HOW OFTEN DO YOU HAVE A DRINK CONTAINING ALCOHOL: MONTHLY OR LESS
AUDIT-C TOTAL SCORE: 1

## 2024-01-05 ASSESSMENT — PATIENT HEALTH QUESTIONNAIRE - PHQ9
1. LITTLE INTEREST OR PLEASURE IN DOING THINGS: NOT AT ALL
SUM OF ALL RESPONSES TO PHQ9 QUESTIONS 1 AND 2: 0
2. FEELING DOWN, DEPRESSED OR HOPELESS: NOT AT ALL

## 2024-01-05 ASSESSMENT — PAIN SCALES - GENERAL: PAINLEVEL: 0-NO PAIN

## 2024-01-05 ASSESSMENT — ENCOUNTER SYMPTOMS
LOSS OF SENSATION IN FEET: 0
DEPRESSION: 0
OCCASIONAL FEELINGS OF UNSTEADINESS: 0

## 2024-01-05 NOTE — ASSESSMENT & PLAN NOTE
- Given your symptoms and duration of illness, we feel that you can benefit from antibiotic coverage at this time  - A prescription for Augmentin was sent to your pharmacy, please take this medication as prescribed  - Recommend supportive care with increased fluid intake to thin secretions, Ibuprofen or Tylenol as needed for pain or fever, and steamy showers/saline nasal rinses to help clear the nasal passages  - continue with Flonase nasal spray  -As there is a slight element of potential bronchitis, I have additionally sent Tessalon perles to help with cough  - You may consider tea with honey or a cinnamon stick as these have natural antiviral and antibiotic properties  - Call if symptoms worsen or do not improve with these treatments.

## 2024-01-05 NOTE — PATIENT INSTRUCTIONS
Problem List Items Addressed This Visit             ICD-10-CM    Acute non-recurrent pansinusitis - Primary J01.40     - Given your symptoms and duration of illness, we feel that you can benefit from antibiotic coverage at this time  - A prescription for Augmentin was sent to your pharmacy, please take this medication as prescribed  - Recommend supportive care with increased fluid intake to thin secretions, Ibuprofen or Tylenol as needed for pain or fever, and steamy showers/saline nasal rinses to help clear the nasal passages  - continue with Flonase nasal spray  -As there is a slight element of potential bronchitis, I have additionally sent Tessalon perles to help with cough  - You may consider tea with honey or a cinnamon stick as these have natural antiviral and antibiotic properties  - Call if symptoms worsen or do not improve with these treatments.         Relevant Medications    amoxicillin-pot clavulanate (Augmentin) 875-125 mg tablet    benzonatate (Tessalon) 100 mg capsule

## 2024-01-05 NOTE — PROGRESS NOTES
Outpatient Visit Note    Chief Complaint   Patient presents with    Nasal Congestion     Sinus congestion [id number]; pt states she has been taking otc meds.       With patient's permission, this is a Telemedicine visit with video and audio. The provider and patient participated in this telemedicine encounter.    HPI:  Brooke Chu is a 43 y.o. female with PMH significant for unspecified neuroendocrine cancer followed by Oncology at  who presents to the office via telemedicine encounter secondary to complaints of upper respiratory infection.  She was last seen via telemedicine encounter by Heather Gonzalez on 12//23 secondary to acute diarrhea.  Prior to this she had been seen for sinus infection in June which was successfully treated with Augmentin           She reports persistent and progressive sinus pressure with upper respiratory congestion and cough for the last 6 weeks.  Admits to associated sinus congestion/drainage, ear pain and headaches.  Symptoms have been more prominent on the right side, reporting mildly productive cough with noted postnasal drainage. Throat has become more sore over the last few days along with pressure/discomfort in right ear.  Over the course of the last 1 to 2 weeks, symptoms have created increased drainage with chest congestion and mildly productive cough.  Has been attempting Flonase and Tylenol for mild symptom relief. Denies any fever, chills, chest pain, shortness a breath, wheeze, abdominal pain, nausea, vomiting or diarrhea.          Current Medications  Current Outpatient Medications   Medication Instructions    amoxicillin-pot clavulanate (Augmentin) 875-125 mg tablet 875 mg, oral, 2 times daily    benzonatate (TESSALON) 100 mg, oral, 3 times daily PRN, Do not crush or chew.    ibuprofen 200 mg tablet 1 tablet, oral, 3 times daily PRN, with food or milk    lanreotide (Somatuline Depot) 120 mg/0.5 mL syringe subcutaneous        Allergies  Allergies   Allergen Reactions     Methylprednisolone Other and Unknown     Abdominal pain    Other Unknown     steroids        Past Medical History:   Diagnosis Date    Other bursal cyst, unspecified wrist     Synovial cyst of wrist      Past Surgical History:   Procedure Laterality Date    CT GUIDED PERCUTANEOUS PERITONEAL OR RETROPERITONEAL FLUID COLLECTION DRAINAGE  11/3/2020    CT GUIDED PERCUTANEOUS PERITONEAL OR RETROPERITONEAL FLUID COLLECTION DRAINAGE 11/3/2020 [facility] [place] INPATIENT LEGACY    OTHER SURGICAL HISTORY  06/29/2020    Wrist surgery    OTHER SURGICAL HISTORY  03/21/2022    Tubal ligation bilateral    OTHER SURGICAL HISTORY  08/24/2022    Neck surgery    OTHER SURGICAL HISTORY  08/24/2022    Gallbladder surgery    OTHER SURGICAL HISTORY  11/14/2020    Hepatectomy partial    OTHER SURGICAL HISTORY  11/14/2020    Liver tumor ablation    OTHER SURGICAL HISTORY  11/14/2020    Cholecystectomy    US GUIDED NEEDLE LIVER BIOPSY  7/23/2020    US GUIDED NEEDLE LIVER BIOPSY 7/23/2020 [facility] [place] AIB LEGACY     Family History   Problem Relation Name Age of Onset    Other (adult respiratory distress sydrome) Mother      Other (cardiac disorder) Mother      COPD Mother      Crohn's disease Mother      Hypothyroidism Mother      Hyperthyroidism Mother      Heart attack Mother      Cerebral aneurysm Father      Hyperthyroidism Other Grandmother     Hypothyroidism Other Grandmother     Diabetes Other Grandfather     Diabetes Other Aunt     Hyperthyroidism Other Aunt     Hypothyroidism Other Aunt     Diabetes Paternal Great-Grandmother       Social History     Tobacco Use    Smoking status: Never     Passive exposure: Never    Smokeless tobacco: Never   Vaping Use    Vaping Use: Never used   Substance Use Topics    Alcohol use: Never    Drug use: Never     Tobacco Use: Low Risk  (1/5/2024)    Patient History     Smoking Tobacco Use: Never     Smokeless Tobacco Use: Never     Passive Exposure: Never        ROS  All pertinent positive symptoms are included in the  history of present illness.  All other systems have been reviewed and are negative and noncontributory to this patient's current ailments.    VITAL SIGNS  Vitals:    01/05/24 1113   Weight: 99.8 kg (220 lb)      Body mass index is 35.51 kg/m².   Patient is unable to provide    PHYSICAL EXAM  GENERAL APPEARANCE:  Alert and oriented x 3, Pleasant and cooperative, No acute distress.   LUNGS:  No conversational dyspnea or cough during encounter.   PSYCH:  appropriate mood and affect, no difficulty with speech.   Telemedicine visit, no other exam component done.      Assessment/Plan   Problem List Items Addressed This Visit             ICD-10-CM    Acute non-recurrent pansinusitis - Primary J01.40     - Given your symptoms and duration of illness, we feel that you can benefit from antibiotic coverage at this time  - A prescription for Augmentin was sent to your pharmacy, please take this medication as prescribed  - Recommend supportive care with increased fluid intake to thin secretions, Ibuprofen or Tylenol as needed for pain or fever, and steamy showers/saline nasal rinses to help clear the nasal passages  - continue with Flonase nasal spray  -As there is a slight element of potential bronchitis, I have additionally sent Tessalon perles to help with cough  - You may consider tea with honey or a cinnamon stick as these have natural antiviral and antibiotic properties  - Call if symptoms worsen or do not improve with these treatments.         Relevant Medications    amoxicillin-pot clavulanate (Augmentin) 875-125 mg tablet    benzonatate (Tessalon) 100 mg capsule

## 2024-01-06 ENCOUNTER — APPOINTMENT (OUTPATIENT)
Dept: RADIOLOGY | Facility: HOSPITAL | Age: 44
End: 2024-01-06
Payer: COMMERCIAL

## 2024-01-18 NOTE — PROGRESS NOTES
Patient ID: Brooke Chu is a 44 y.o. female.  Visit type: Follow up visit      Current Therapy  Treatment Plan:  Lanreotide, Every 28 Days      ONCOLOGIC HISTORY    42-year-old woman with a several year history of neck pain on the Rt side and for exploration they did full imaging include CT neck, chest and AP on 06/30/2020  which showed a large lobulated heterogeneously enhancing mass lesion involving segments 7 and 8 of the right hepatic lobe measuring approximately 13.2 x 10.5 x 9.7 cm in size. 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   MRI showed large hypervascular heterogeneously enhancing hepatic mass lesion in segment 7 and 8 and multiple other satellite hepatic lesions involving right and left hepatic lobes. Stable subcentimeter zayda hepatis lymph nodes. No other significant lymphadenopathy  or free intraperitoneal fluid.  PET-NET showed Dominant centrally necrotic somatostatin receptor expressing mass within hepatic segments 7 and 8. Additional satellite lesions in the bilateral hepatic lobes as described above. Somatostatin avid Left internal iliac lymph nodes. No additional  sites of abnormal Ga-68 dotatate uptake suggestive of malignancy identified.  PET-FDG didn't show any extra glucose uptake  Colonoscopy and SB enteroscopy didn't show any primary lesion. Symptoms of diarrhea and intermittent flushing s/p 1 dose of lanreotide after which she had severe bloating and abdominal pain as well as diarrhea   10/27: She had partial hepatectomy (segment 3, 5, 6), liver ablation (segment 5, 3), cholecystectomy   She recovered well with surgery. She was tested positive for COVID  and only symptoms she has was loss of taste and stuffy nose.   She started on lanreotide but d/t multiple side effects including gas, bloating, and some hypoglycemia episodes she was switched to  Octreotide 30 mg in December of 2021  MRI abd/pelvis 1/7/2021 appears stable but limited imaging d/t inability to do with contrast as IV infiltrated during scan per patient.     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 female who presents for follow up and to review scans. She has occasional abdominal cramping. She reports ongoing swelling in her legs managed with lasix. She has nausea that is managed with zofran, no emesis. She has intermittent diarrhea and formed stool. She denies fever or chills, no shortness of breath or chest pain.   She has episodes of her blood glucose dropping suddenly, would like another referral to endocrinology.    Review of Systems   All other systems reviewed and are negative.      OBJECTIVE:    VS:  LMP 01/02/2024 (Exact Date)   Weight: There were no vitals filed for this visit.    BSA: There is no height or weight on file to calculate BSA.    Physical Exam  Vitals reviewed.   Constitutional:       General: She is not in acute distress.     Appearance: Normal appearance.   HENT:      Head: Normocephalic.   Pulmonary:      Effort: Pulmonary effort is normal.   Neurological:      General: No focal deficit present.      Mental Status: She is alert and oriented to person, place, and time. Mental status is at baseline.   Psychiatric:         Mood and Affect: Mood normal.         Behavior: Behavior normal.         Thought Content: Thought content normal.         Judgment: Judgment normal.           Diagnostic Results     Lab on 10/06/2023   Component Date Value    5-Hydroxyindoleacetic Ac* 10/06/2023 6.2     WBC 10/06/2023 7.5     nRBC 10/06/2023 0.0     RBC 10/06/2023 4.80     Hemoglobin 10/06/2023 14.6     Hematocrit 10/06/2023 44.1     MCV 10/06/2023 92     MCH 10/06/2023 30.4     MCHC 10/06/2023 33.1     RDW 10/06/2023 12.9     Platelets 10/06/2023 229      MPV 10/06/2023 11.1     Neutrophils % 10/06/2023 55.6     Immature Granulocytes %,* 10/06/2023 0.3     Lymphocytes % 10/06/2023 33.8     Monocytes % 10/06/2023 7.5     Eosinophils % 10/06/2023 2.0     Basophils % 10/06/2023 0.8     Neutrophils Absolute 10/06/2023 4.16     Immature Granulocytes Ab* 10/06/2023 0.02     Lymphocytes Absolute 10/06/2023 2.53     Monocytes Absolute 10/06/2023 0.56     Eosinophils Absolute 10/06/2023 0.15     Basophils Absolute 10/06/2023 0.06     Glucose 10/06/2023 112 (H)     Sodium 10/06/2023 137     Potassium 10/06/2023 4.1     Chloride 10/06/2023 105     Bicarbonate 10/06/2023 25     Anion Gap 10/06/2023 11     Urea Nitrogen 10/06/2023 15     Creatinine 10/06/2023 0.77     eGFR 10/06/2023 >90     Calcium 10/06/2023 9.1     Albumin 10/06/2023 4.1     Alkaline Phosphatase 10/06/2023 73     Total Protein 10/06/2023 7.3     AST 10/06/2023 19     Bilirubin, Total 10/06/2023 0.4     ALT 10/06/2023 19            MR pelvis w and wo IV contrast, MR abdomen w and wo IV contrast  Narrative: Interpreted By:  Luna Benites,   STUDY:  MR PELVIS W AND WO IV CONTRAST; MR ABDOMEN W AND WO IV CONTRAST;  1/2/2024 8:29 am      INDICATION:  Signs/Symptoms:Neuroendocrine carcinoma on treatment, restaging scans.      COMPARISON:  10/14/2023      ACCESSION NUMBER(S):  [id number]; [id number]      ORDERING CLINICIAN:  SKYLA GIMENEZ      TECHNIQUE:  Multiplanar multisequence T1 and T2 weighted images obtained through  the pelvis as well as the abdomen with and without 20 mL of Dotarem  intravenous contrast.      FINDINGS:  Lower Chest: Clear.      Liver: Partial right hepatectomy. Stable small cyst in the dome of  the liver. There is an 8 mm arterially enhancing lesion in the right  hepatic lobe (series 28, image 33), few additional arterially  enhancing foci in the dome of the liver measuring between 6 mm and 8  mm. No associated signal abnormality on anatomic sequences or other  postcontrast phases of  imaging.      Gallbladder and Biliary: Status post cholecystectomy.      Pancreas: No abnormality identified in the pancreas.      Spleen: No abnormality identified in the spleen.      Adrenals: No abnormality identified in either adrenal gland.      Urinary: Small left renal cysts. Small area of lower pole scarring in  the left kidney. No hydronephrosis.      Reproductive: Few small foci of T2 signal hypointensity in the uterus  suggestive of fibroids. Left ovarian cyst measuring 2.3 cm.      Gastrointestinal/Peritoneum: No small or large bowel obstruction in  the visualized abdomen. In the abdomen, there is no extraluminal air.  Trace nonspecific pelvic free fluid.      Vascular: Abdominal aorta is normal in caliber.      Lymphatics: No enlarged lymph nodes by size criteria.      MSK/Body Wall: No aggressive bony lesion identified. Degenerative  change in the lower lumbar spine. Injection granulomas in the  buttocks bilaterally.      Impression: 1.  Postoperative changes of partial right hepatectomy. Few small  subcentimeter arterially enhancing foci without signal abnormality on  other postcontrast sequences or anatomic sequences. Attention during  follow-up.  2. No adverse interval change.          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 The possibility of origin in  pancreas and duodenum should be excluded.   MRI showed large hypervascular heterogeneously enhancing hepatic mass lesion in segment 7 and 8 and multiple other satellite hepatic lesions involving right and left hepatic lobes. Stable subcentimeter zayda hepatis lymph nodes. No other significant lymphadenopathy  or free intraperitoneal fluid.  PET-NET showed Dominant centrally necrotic somatostatin receptor expressing mass within hepatic segments 7 and 8. Additional satellite lesions in the bilateral hepatic lobes as described above. Somatostatin avid Left internal iliac lymph nodes. No additional  sites of abnormal Ga-68 dotatate uptake suggestive of malignancy identified.  PET-FDG didnt show any extra glucose uptake  Colonoscopy and SB enteroscopy didn't show any primary lesion  10/27/2020: She had partial hepatectomy (segment 3, 5, 6), liver ablation (segment 5, 3), cholecystectomy and pth confirmed G2 well diff NET (kI67 3.1%)    She recovered well with surgery. She was tested positive for COVID  and only symptoms she has was loss of taste and stuffy nose.   04/26/2021: Restaging CT scan showed post surgical changes and postablation changes in the liver with no new lesions  MRI showed At least 4 foci of restricted diffusion are identified in segments 8 and 3 without correlating abnormality on other sequences. However 2 of these foci correlate with hypervascular foci and a prior CT scan.  Findings are nonspecific, however  metastatic foci cannot be excluded  MRI showed stable disease on 08/16/2021  PET-Ga68 on 11/2021 showed multiple small (3-4) somatostatin avid foci are noted scattered throughout the hepatic  parenchyma which appear stable number and relative intensity compared to the prior exam   Octreotide 30 mg started 12/2021 as she was not tolerating lanreotide well with multiple side effects.  MRI Brain 12/13 done for recurrent headaches/ to rule out mets- Scan WNL  MRI abd/pelvis 1/7/2021 appears stable but  "limited imaging d/t inability to do with contrast as IV infiltrated during scan per patient.   Repeated MRI on 03/2022: Showed stable disease with one new liver lesion   Repeated MRI on 07/23/2022: Showed stable disease   PET scan on 10/14/2022 shows stable disease  MRI AP on 3/6/2023 shows stable disease. 5HIAA 3.3 on 3/29/23  MRI AP on 7/3/2023 shows stable disease  MRI AP on 10/14/23 shows: \"Status post partial right hepatectomy. Stable indeterminate hepatic arterial enhancing foci as detailed above. No new suspicious lesions.\"  MRI AP on 1/2/24 shows: Few small subcentimeter arterially enhancing foci without signal abnormality on other postcontrast sequences or anatomic sequences     Plan:    -Patient has no  new complaints  -Reviewed scan results with patient  -Continue monthly SSA with lanreotide  -patient to be presented to [facility] on 2/6  -Phone visit on 2/7 to discuss tumor board recommendations    Hypoglycemia:  -Referral placed to endocrinology    LE edema:  -7/21/23 Echo with no concerning findings, she will follow up with cardiology   -continue lasix as needed    Headaches: Improved  -7/21/23 MRI Brain with no metastatic disease, she will follow up with neurology for frequent headaches    Some elements copied from my note on 10/20/23, the elements have been updated and all reflect current decision making from today, 01/19/24         GILDA Olivarez-CNP  01/19/24    "

## 2024-01-19 ENCOUNTER — TELEMEDICINE (OUTPATIENT)
Dept: HEMATOLOGY/ONCOLOGY | Facility: HOSPITAL | Age: 44
End: 2024-01-19
Payer: COMMERCIAL

## 2024-01-19 DIAGNOSIS — C7A.8 NEUROENDOCRINE CANCER (MULTI): Primary | ICD-10-CM

## 2024-01-19 DIAGNOSIS — E16.2 HYPOGLYCEMIA: ICD-10-CM

## 2024-01-19 DIAGNOSIS — R60.0 BILATERAL LEG EDEMA: ICD-10-CM

## 2024-01-19 PROCEDURE — 99214 OFFICE O/P EST MOD 30 MIN: CPT | Performed by: NURSE PRACTITIONER

## 2024-01-24 ENCOUNTER — APPOINTMENT (OUTPATIENT)
Dept: HEMATOLOGY/ONCOLOGY | Facility: HOSPITAL | Age: 44
End: 2024-01-24
Payer: COMMERCIAL

## 2024-01-24 ENCOUNTER — TELEPHONE (OUTPATIENT)
Dept: PRIMARY CARE | Facility: CLINIC | Age: 44
End: 2024-01-24

## 2024-01-24 ENCOUNTER — LAB (OUTPATIENT)
Dept: LAB | Facility: LAB | Age: 44
End: 2024-01-24
Payer: COMMERCIAL

## 2024-01-24 DIAGNOSIS — C7B.8 METASTATIC MALIGNANT NEUROENDOCRINE TUMOR TO LIVER (MULTI): ICD-10-CM

## 2024-01-24 DIAGNOSIS — J01.40 ACUTE NON-RECURRENT PANSINUSITIS: Primary | ICD-10-CM

## 2024-01-24 LAB
ALBUMIN SERPL BCP-MCNC: 4.1 G/DL (ref 3.4–5)
ALP SERPL-CCNC: 80 U/L (ref 33–110)
ALT SERPL W P-5'-P-CCNC: 15 U/L (ref 7–45)
ANION GAP SERPL CALC-SCNC: 14 MMOL/L (ref 10–20)
AST SERPL W P-5'-P-CCNC: 16 U/L (ref 9–39)
BASOPHILS # BLD AUTO: 0.06 X10*3/UL (ref 0–0.1)
BASOPHILS NFR BLD AUTO: 0.5 %
BILIRUB SERPL-MCNC: 0.4 MG/DL (ref 0–1.2)
BUN SERPL-MCNC: 11 MG/DL (ref 6–23)
CALCIUM SERPL-MCNC: 8.9 MG/DL (ref 8.6–10.3)
CHLORIDE SERPL-SCNC: 106 MMOL/L (ref 98–107)
CO2 SERPL-SCNC: 25 MMOL/L (ref 21–32)
CREAT SERPL-MCNC: 0.73 MG/DL (ref 0.5–1.05)
EGFRCR SERPLBLD CKD-EPI 2021: >90 ML/MIN/1.73M*2
EOSINOPHIL # BLD AUTO: 0.19 X10*3/UL (ref 0–0.7)
EOSINOPHIL NFR BLD AUTO: 1.6 %
ERYTHROCYTE [DISTWIDTH] IN BLOOD BY AUTOMATED COUNT: 13 % (ref 11.5–14.5)
GLUCOSE SERPL-MCNC: 90 MG/DL (ref 74–99)
HCT VFR BLD AUTO: 45.4 % (ref 36–46)
HGB BLD-MCNC: 14.7 G/DL (ref 12–16)
IMM GRANULOCYTES # BLD AUTO: 0.05 X10*3/UL (ref 0–0.7)
IMM GRANULOCYTES NFR BLD AUTO: 0.4 % (ref 0–0.9)
LYMPHOCYTES # BLD AUTO: 2.74 X10*3/UL (ref 1.2–4.8)
LYMPHOCYTES NFR BLD AUTO: 23.3 %
MCH RBC QN AUTO: 29.7 PG (ref 26–34)
MCHC RBC AUTO-ENTMCNC: 32.4 G/DL (ref 32–36)
MCV RBC AUTO: 92 FL (ref 80–100)
MONOCYTES # BLD AUTO: 0.81 X10*3/UL (ref 0.1–1)
MONOCYTES NFR BLD AUTO: 6.9 %
NEUTROPHILS # BLD AUTO: 7.9 X10*3/UL (ref 1.2–7.7)
NEUTROPHILS NFR BLD AUTO: 67.3 %
NRBC BLD-RTO: 0 /100 WBCS (ref 0–0)
PLATELET # BLD AUTO: 244 X10*3/UL (ref 150–450)
POTASSIUM SERPL-SCNC: 4.8 MMOL/L (ref 3.5–5.3)
PROT SERPL-MCNC: 6.5 G/DL (ref 6.4–8.2)
RBC # BLD AUTO: 4.95 X10*6/UL (ref 4–5.2)
SODIUM SERPL-SCNC: 140 MMOL/L (ref 136–145)
WBC # BLD AUTO: 11.8 X10*3/UL (ref 4.4–11.3)

## 2024-01-24 PROCEDURE — 83497 ASSAY OF 5-HIAA: CPT

## 2024-01-24 PROCEDURE — 36415 COLL VENOUS BLD VENIPUNCTURE: CPT

## 2024-01-24 NOTE — TELEPHONE ENCOUNTER
Pt called in stating she was seen by PCP two weeks ago and was prescribed an antibiotic for a sinus infection. Pt states the sinus infection never went away and today had bloodwork done that had shown elevated WBC's. Pt is asking if PCP can send in an rx for a stronger antibiotic.

## 2024-01-25 RX ORDER — DOXYCYCLINE 100 MG/1
100 CAPSULE ORAL 2 TIMES DAILY
Qty: 20 CAPSULE | Refills: 0 | Status: SHIPPED | OUTPATIENT
Start: 2024-01-25 | End: 2024-02-04

## 2024-01-25 NOTE — TELEPHONE ENCOUNTER
Secondary to persistent symptoms, I have sent a secondary antibiotic course in the form of doxycycline twice a day x 10 days.

## 2024-01-26 ENCOUNTER — INFUSION (OUTPATIENT)
Dept: HEMATOLOGY/ONCOLOGY | Facility: HOSPITAL | Age: 44
End: 2024-01-26
Payer: COMMERCIAL

## 2024-01-26 VITALS
SYSTOLIC BLOOD PRESSURE: 131 MMHG | TEMPERATURE: 97.2 F | RESPIRATION RATE: 16 BRPM | DIASTOLIC BLOOD PRESSURE: 89 MMHG | HEART RATE: 89 BPM | OXYGEN SATURATION: 99 %

## 2024-01-26 DIAGNOSIS — C7A.8 NEUROENDOCRINE CANCER (MULTI): ICD-10-CM

## 2024-01-26 LAB — GLUCOSE BLD MANUAL STRIP-MCNC: 70 MG/DL (ref 74–99)

## 2024-01-26 PROCEDURE — 82947 ASSAY GLUCOSE BLOOD QUANT: CPT

## 2024-01-26 PROCEDURE — 96372 THER/PROPH/DIAG INJ SC/IM: CPT

## 2024-01-26 PROCEDURE — 2500000004 HC RX 250 GENERAL PHARMACY W/ HCPCS (ALT 636 FOR OP/ED): Mod: JZ | Performed by: NURSE PRACTITIONER

## 2024-01-26 PROCEDURE — 96372 THER/PROPH/DIAG INJ SC/IM: CPT | Performed by: NURSE PRACTITIONER

## 2024-01-26 RX ORDER — LANREOTIDE ACETATE 120 MG/.5ML
120 INJECTION SUBCUTANEOUS ONCE
Status: CANCELLED | OUTPATIENT
Start: 2024-02-23

## 2024-01-26 RX ORDER — ALBUTEROL SULFATE 0.83 MG/ML
3 SOLUTION RESPIRATORY (INHALATION) AS NEEDED
Status: CANCELLED | OUTPATIENT
Start: 2024-02-23

## 2024-01-26 RX ORDER — LANREOTIDE ACETATE 120 MG/.5ML
120 INJECTION SUBCUTANEOUS ONCE
Status: COMPLETED | OUTPATIENT
Start: 2024-01-26 | End: 2024-01-26

## 2024-01-26 RX ORDER — EPINEPHRINE 0.3 MG/.3ML
0.3 INJECTION SUBCUTANEOUS EVERY 5 MIN PRN
Status: CANCELLED | OUTPATIENT
Start: 2024-02-23

## 2024-01-26 RX ORDER — DIPHENHYDRAMINE HYDROCHLORIDE 50 MG/ML
50 INJECTION INTRAMUSCULAR; INTRAVENOUS AS NEEDED
Status: CANCELLED | OUTPATIENT
Start: 2024-02-23

## 2024-01-26 RX ORDER — FAMOTIDINE 10 MG/ML
20 INJECTION INTRAVENOUS ONCE AS NEEDED
Status: CANCELLED | OUTPATIENT
Start: 2024-02-23

## 2024-01-26 RX ADMIN — LANREOTIDE ACETATE 120 MG: 120 INJECTION SUBCUTANEOUS at 09:06

## 2024-01-26 ASSESSMENT — PAIN SCALES - GENERAL: PAINLEVEL: 0-NO PAIN

## 2024-01-26 ASSESSMENT — PATIENT HEALTH QUESTIONNAIRE - PHQ9
2. FEELING DOWN, DEPRESSED OR HOPELESS: NOT AT ALL
1. LITTLE INTEREST OR PLEASURE IN DOING THINGS: NOT AT ALL
SUM OF ALL RESPONSES TO PHQ9 QUESTIONS 1 & 2: 0

## 2024-01-26 NOTE — PROGRESS NOTES
Pt arrived for injection.  Pt states she feels her Blood sugar is low.  BGM 70, when checks.  Pt with mild tremor to her hands due to low blood sugar. Orange juice and peanut butter crackers given.  Pt states she feels fine, otherwise, and that this happens to her about twice a week.  Has an appt with endocrinologist that specializes in there cancer and blood sugar regulation.  She will see if she can get her appt moved to sooner. Pt states she usual feels like this when her sugar is down in the 40's.  No other distress pr complaints noted.   
Pt fully recovered from low blood sugar.    
Pain - Patient was clinically upgraded due to pain.

## 2024-02-01 LAB — 5OH-INDOLEACETATE SERPL-MCNC: 5.7 NG/ML

## 2024-02-05 NOTE — PROGRESS NOTES
Patient ID: Brooke Chu is a 44 y.o. female.  Visit type: Follow up telephone visit    A telephone visit (audio only) between the patient (at the originating site) and the provider (at the distant site) was utilized to provide this telehealth service.   Verbal consent was requested and obtained from Brooke Chu on this date, 02/07/24 for a telehealth visit.       Current Therapy  Treatment Plan:  Lanreotide, Every 28 Days      ONCOLOGIC HISTORY    42-year-old woman with a several year history of neck pain on the Rt side and for exploration they did full imaging include CT neck, chest and AP on 06/30/2020  which showed a large lobulated heterogeneously enhancing mass lesion involving segments 7 and 8 of the right hepatic lobe measuring approximately 13.2 x 10.5 x 9.7 cm in size. 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   MRI showed large hypervascular heterogeneously enhancing hepatic mass lesion in segment 7 and 8 and multiple other satellite hepatic lesions involving right and left hepatic lobes. Stable subcentimeter zayda hepatis lymph nodes. No other significant lymphadenopathy  or free intraperitoneal fluid.  PET-NET showed Dominant centrally necrotic somatostatin receptor expressing mass within hepatic segments 7 and 8. Additional satellite lesions in the bilateral hepatic lobes as described above. Somatostatin avid Left internal iliac lymph nodes. No additional  sites of abnormal Ga-68 dotatate uptake suggestive of malignancy identified.  PET-FDG didn't show any extra glucose uptake  Colonoscopy and SB enteroscopy didn't show any primary lesion. Symptoms of diarrhea and intermittent flushing s/p 1 dose of lanreotide after which she had severe bloating and abdominal pain as well as diarrhea   10/27: She had partial hepatectomy (segment 3, 5, 6), liver ablation  (segment 5, 3), cholecystectomy   She recovered well with surgery. She was tested positive for COVID  and only symptoms she has was loss of taste and stuffy nose.   She started on lanreotide but d/t multiple side effects including gas, bloating, and some hypoglycemia episodes she was switched to Octreotide 30 mg in December of 2021  MRI abd/pelvis 1/7/2021 appears stable but limited imaging d/t inability to do with contrast as IV infiltrated during scan per patient.     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 She has no new symptoms.     Review of Systems   Cardiovascular:  Positive for leg swelling.   Gastrointestinal:  Positive for abdominal pain, diarrhea and nausea.   All other systems reviewed and are negative.      OBJECTIVE:    VS:  There were no vitals taken for this visit.  Weight: There were no vitals filed for this visit.    BSA: There is no height or weight on file to calculate BSA.      Physical Exam not done due to phone visit    Diagnostic Results     Comprehensive Metabolic Panel              Component  Ref Range & Units 2 wk ago  (1/24/24) 4 mo ago  (10/6/23) 7 mo ago  (7/12/23) 10 mo ago  (3/29/23) 1 yr ago  (10/24/22) 1 yr ago  (8/27/22) 1 yr ago  (7/20/22)   Glucose  74 - 99 mg/dL 90 112 High  94 121 High  103 High  97 98   Sodium  136 - 145 mmol/L 140 137 143 140 139 137 136   Potassium  3.5 - 5.3 mmol/L 4.8 4.1 4.1 4.3 3.8 4.1 4.0   Chloride  98 - 107 mmol/L 106 105 108 High  105 108 High  107 103   Bicarbonate  21 - 32 mmol/L 25 25 28 28 23 21 26   Anion Gap  10 - 20 mmol/L 14 11 11 11 12 13 11   Urea Nitrogen  6 - 23 mg/dL 11 15 13 9 10 12 11   Creatinine  0.50 - 1.05 mg/dL 0.73 0.77 0.81 0.63 0.63 0.61 0.74   eGFR  >60 mL/min/1.73m*2 >90 >90 CM        Comment: Calculations of estimated GFR are performed using  the 2021 CKD-EPI Study Refit equation without the race variable for the IDMS-Traceable creatinine methods.  https://jasn.asnjournals.org/content/early/2021/09/22/ASN.2998853893   Calcium  8.6 - 10.3 mg/dL 8.9 9.1 9.4 9.4 R 8.7 8.5 Low  8.7   Albumin  3.4 - 5.0 g/dL 4.1 4.1 4.2 4.2 4.0  4.1   Alkaline Phosphatase  33 - 110 U/L 80 73 70 68 63  65   Total Protein  6.4 - 8.2 g/dL 6.5 7.3 7.1 7.0 6.8  7.1   AST  9 - 39 U/L 16 19 14 13 13  20   Bilirubin, Total  0.0 - 1.2 mg/dL 0.4 0.4 0.3 0.4 0.5  0.4   ALT  7 - 45 U/L 15 19 CM 11 CM            Contains abnormal data CBC and Auto Differential                Component  Ref Range & Units 2 wk ago  (1/24/24) 4 mo ago  (10/6/23) 7 mo ago  (7/12/23) 10 mo ago  (3/29/23) 1 yr ago  (10/24/22) 1 yr ago  (7/20/22) 1 yr ago  (7/8/22)   WBC  4.4 - 11.3 x10*3/uL 11.8 High  7.5 9.5 R 7.9 R 7.4 R 6.8 R 9.0 R   nRBC  0.0 - 0.0 /100 WBCs 0.0 0.0        RBC  4.00 - 5.20 x10*6/uL 4.95 4.80 4.73 R 4.79 R 4.55 R 4.54 R 4.71 R   Hemoglobin  12.0 - 16.0 g/dL 14.7 14.6 14.2 14.1 13.6 13.4 14.0   Hematocrit  36.0 - 46.0 % 45.4 44.1 42.5 42.4 41.6 42.1 43.1   MCV  80 - 100 fL 92 92 90 89 91 93 92   MCH  26.0 - 34.0 pg 29.7 30.4        MCHC  32.0 - 36.0 g/dL 32.4 33.1 33.4 33.3 32.7 31.8 Low  32.5   RDW  11.5 - 14.5 % 13.0 12.9 13.5 13.5 13.3 13.2 12.9   Platelets  150 - 450 x10*3/uL 244 229 205 R 217 R 183 R 191 R 220 R   Neutrophils %  40.0 - 80.0 % 67.3 55.6 63.3 55.6 56.8 54.0 64.2   Immature Granulocytes %, Automated  0.0 - 0.9 % 0.4 0.3 CM 0.5 CM 0.4 CM 0.1 CM 0.6 CM 0.2 CM   Comment: Immature Granulocyte Count (IG) includes promyelocytes, myelocytes and metamyelocytes but does not include bands. Percent differential counts (%) should be interpreted in the context of the absolute cell counts (cells/UL).   Lymphocytes %  13.0 - 44.0 % 23.3 33.8 26.2 33.2 31.8 34.2 25.6 CM   Monocytes %  2.0 - 10.0 % 6.9 7.5 7.2 7.9 8.4 7.9 7.6   Eosinophils %  0.0 - 6.0 % 1.6 2.0 2.3 2.3 2.4 2.3 2.0    Basophils %  0.0 - 2.0 % 0.5 0.8 0.5 0.6 0.5 1.0 0.4   Neutrophils Absolute  1.20 - 7.70 x10*3/uL 7.90 High  4.16 CM 6.02 R 4.41 R 4.20 R 3.67 R 5.78 R   Comment: Percent differential counts (%) should be interpreted in the context of the absolute cell counts (cells/uL).   Immature Granulocytes Absolute, Automated  0.00 - 0.70 x10*3/uL 0.05 0.02        Lymphocytes Absolute  1.20 - 4.80 x10*3/uL 2.74 2.53 2.49 R 2.64 R 2.35 R 2.33 R 2.30 R   Monocytes Absolute  0.10 - 1.00 x10*3/uL 0.81 0.56 0.69 R 0.63 R 0.62 R 0.54 R 0.68 R   Eosinophils Absolute  0.00 - 0.70 x10*3/uL 0.19 0.15 0.22 R 0.18 R 0.18 R 0.16 R 0.18 R   Basophils Absolute  0.00 - 0.10 x10*3/uL 0.06 0.06 0.05 R 0.05 R 0.04 R 0.07 R 0.04 R   MPV  11.1 R             5-Hydroxyindoleacetic Acid,Plasma          Component  Ref Range & Units 2 wk ago 4 mo ago 10 mo ago   5-Hydroxyindoleacetic Acid (5-HIAA),Plasma  ng/mL 5.7 6.2 CM 3.3 CM          MR pelvis w and wo IV contrast, MR abdomen w and wo IV contrast  Narrative: Interpreted By:  Luna Benites,   STUDY:  MR PELVIS W AND WO IV CONTRAST; MR ABDOMEN W AND WO IV CONTRAST;  1/2/2024 8:29 am      INDICATION:  Signs/Symptoms:Neuroendocrine carcinoma on treatment, restaging scans.      COMPARISON:  10/14/2023      ACCESSION NUMBER(S):  [id number]; [id number]      ORDERING CLINICIAN:  SKYLA GIMENEZ      TECHNIQUE:  Multiplanar multisequence T1 and T2 weighted images obtained through  the pelvis as well as the abdomen with and without 20 mL of Dotarem  intravenous contrast.      FINDINGS:  Lower Chest: Clear.      Liver: Partial right hepatectomy. Stable small cyst in the dome of  the liver. There is an 8 mm arterially enhancing lesion in the right  hepatic lobe (series 28, image 33), few additional arterially  enhancing foci in the dome of the liver measuring between 6 mm and 8  mm. No associated signal abnormality on anatomic sequences or other  postcontrast phases of imaging.      Gallbladder and  Biliary: Status post cholecystectomy.      Pancreas: No abnormality identified in the pancreas.      Spleen: No abnormality identified in the spleen.      Adrenals: No abnormality identified in either adrenal gland.      Urinary: Small left renal cysts. Small area of lower pole scarring in  the left kidney. No hydronephrosis.      Reproductive: Few small foci of T2 signal hypointensity in the uterus  suggestive of fibroids. Left ovarian cyst measuring 2.3 cm.      Gastrointestinal/Peritoneum: No small or large bowel obstruction in  the visualized abdomen. In the abdomen, there is no extraluminal air.  Trace nonspecific pelvic free fluid.      Vascular: Abdominal aorta is normal in caliber.      Lymphatics: No enlarged lymph nodes by size criteria.      MSK/Body Wall: No aggressive bony lesion identified. Degenerative  change in the lower lumbar spine. Injection granulomas in the  buttocks bilaterally.      Impression: 1.  Postoperative changes of partial right hepatectomy. Few small  subcentimeter arterially enhancing foci without signal abnormality on  other postcontrast sequences or anatomic sequences. Attention during  follow-up.  2. No adverse interval change.          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 The possibility of origin in pancreas and duodenum should be  excluded.   MRI showed large hypervascular heterogeneously enhancing hepatic mass lesion in segment 7 and 8 and multiple other satellite hepatic lesions involving right and left hepatic lobes. Stable subcentimeter zayda hepatis lymph nodes. No other significant lymphadenopathy  or free intraperitoneal fluid.  PET-NET showed Dominant centrally necrotic somatostatin receptor expressing mass within hepatic segments 7 and 8. Additional satellite lesions in the bilateral hepatic lobes as described above. Somatostatin avid Left internal iliac lymph nodes. No additional  sites of abnormal Ga-68 dotatate uptake suggestive of malignancy identified.  PET-FDG didnt show any extra glucose uptake  Colonoscopy and SB enteroscopy didn't show any primary lesion  10/27/2020: She had partial hepatectomy (segment 3, 5, 6), liver ablation (segment 5, 3), cholecystectomy and pth confirmed G2 well diff NET (kI67 3.1%)    She recovered well with surgery. She was tested positive for COVID  and only symptoms she has was loss of taste and stuffy nose.   04/26/2021: Restaging CT scan showed post surgical changes and postablation changes in the liver with no new lesions  MRI showed At least 4 foci of restricted diffusion are identified in segments 8 and 3 without correlating abnormality on other sequences. However 2 of these foci correlate with hypervascular foci and a prior CT scan.  Findings are nonspecific, however  metastatic foci cannot be excluded  MRI showed stable disease on 08/16/2021  PET-Ga68 on 11/2021 showed multiple small (3-4) somatostatin avid foci are noted scattered throughout the hepatic  parenchyma which appear stable number and relative intensity compared to the prior exam   Octreotide 30 mg started 12/2021 as she was not tolerating lanreotide well with multiple side effects.  MRI Brain 12/13 done for recurrent headaches/ to rule out mets- Scan WNL  MRI abd/pelvis 1/7/2021 appears stable but limited imaging d/t inability  "to do with contrast as IV infiltrated during scan per patient.   Repeated MRI on 03/2022: Showed stable disease with one new liver lesion   Repeated MRI on 07/23/2022: Showed stable disease   PET scan on 10/14/2022 shows stable disease  MRI AP on 3/6/2023 shows stable disease. 5HIAA 3.3 on 3/29/23  MRI AP on 7/3/2023 shows stable disease  MRI AP on 10/14/23 shows: \"Status post partial right hepatectomy. Stable indeterminate hepatic arterial enhancing foci as detailed above. No new suspicious lesions.\"  MRI AP on 1/2/24 shows: Few small subcentimeter arterially enhancing foci without signal abnormality on other postcontrast sequences or anatomic sequences  Patient presented to TB with recommendations: \"Liver foci are very small and not sure if these are new NETs. MRI w eovist to compare with MRI on 10/2023 and PET-CU64. Resume SSA and if PET-CU64 only showed liver lesions will proceed with surgery vs ablation\"    Plan:    -Patient has no  new complaints  -Continue monthly SSA with lanreotide  -MRI liver with eovist ordered  -PET CU64 ordered  -RTC after scans for follow up and to review scan results    Diarrhea:  -Not using imodium due to concern for constipation    Nausea:  - Continue with atiemetics as prescribed    Hypoglycemia:  -Referral placed to endocrinology    LE edema:  -7/21/23 Echo with no concerning findings, she will follow up with cardiology   -continue lasix as needed    Headaches: Improved  -7/21/23 MRI Brain with no metastatic disease, she will follow up with neurology for frequent headaches    Some elements copied from my note on 1/19/24 the elements have been updated and all reflect current decision making from today, 02/07/24         GILDA Olivarez-CNP  02/07/24    "

## 2024-02-06 ENCOUNTER — TUMOR BOARD CONFERENCE (OUTPATIENT)
Dept: HEMATOLOGY/ONCOLOGY | Facility: HOSPITAL | Age: 44
End: 2024-02-06
Payer: COMMERCIAL

## 2024-02-06 NOTE — TUMOR BOARD NOTE
Brooke Chu was presented by Sotero Arango at our Tumor Board on 2/6/2024.     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 The possibility of origin in pancreas and duodenum should be excluded.   10/27/2020: She had partial hepatectomy (segment 3, 5, 6), liver ablation (segment 5, 3), cholecystectomy and pth confirmed G2 well diff NET (kI67 3.1%)   She has been on sandostatin and she has stable disease    MRI AP on 1/2/24 shows: Few small subcentimeter arterially enhancing foci without signal abnormality on other postcontrast sequences or anatomic sequences    Recommendations:  - Liver foci are very small and not sure if these are new NETs  - MRI w eovist to compare with MRI on 10/2023 and PET-CU64  - Resume SSA and if PET-CU64 only showed liver lesions will proceed with surgery vs ablation

## 2024-02-07 ENCOUNTER — TELEMEDICINE (OUTPATIENT)
Dept: HEMATOLOGY/ONCOLOGY | Facility: HOSPITAL | Age: 44
End: 2024-02-07
Payer: COMMERCIAL

## 2024-02-07 DIAGNOSIS — R19.7 DIARRHEA, UNSPECIFIED TYPE: ICD-10-CM

## 2024-02-07 DIAGNOSIS — R11.0 NAUSEA: ICD-10-CM

## 2024-02-07 DIAGNOSIS — C7A.8 NEUROENDOCRINE CANCER (MULTI): Primary | ICD-10-CM

## 2024-02-07 DIAGNOSIS — R60.0 BILATERAL LEG EDEMA: ICD-10-CM

## 2024-02-07 PROCEDURE — 99213 OFFICE O/P EST LOW 20 MIN: CPT | Performed by: NURSE PRACTITIONER

## 2024-02-07 PROCEDURE — 1036F TOBACCO NON-USER: CPT | Performed by: NURSE PRACTITIONER

## 2024-02-07 ASSESSMENT — ENCOUNTER SYMPTOMS
LEG SWELLING: 1
ABDOMINAL PAIN: 1
DIARRHEA: 1
NAUSEA: 1

## 2024-02-19 ENCOUNTER — HOSPITAL ENCOUNTER (OUTPATIENT)
Dept: RADIOLOGY | Facility: HOSPITAL | Age: 44
Discharge: HOME | End: 2024-02-19
Payer: COMMERCIAL

## 2024-02-19 DIAGNOSIS — C7A.8 NEUROENDOCRINE CANCER (MULTI): ICD-10-CM

## 2024-02-19 PROCEDURE — 78815 PET IMAGE W/CT SKULL-THIGH: CPT | Mod: PS

## 2024-02-19 PROCEDURE — 74183 MRI ABD W/O CNTR FLWD CNTR: CPT

## 2024-02-19 PROCEDURE — 3430000001 HC RX 343 DIAGNOSTIC RADIOPHARMACEUTICALS: Performed by: NURSE PRACTITIONER

## 2024-02-19 PROCEDURE — 2500000004 HC RX 250 GENERAL PHARMACY W/ HCPCS (ALT 636 FOR OP/ED): Performed by: NURSE PRACTITIONER

## 2024-02-19 PROCEDURE — A9592 HC RX 343 DIAGNOSTIC RADIOPHARMACEUTICALS: HCPCS | Performed by: NURSE PRACTITIONER

## 2024-02-19 PROCEDURE — 78815 PET IMAGE W/CT SKULL-THIGH: CPT | Mod: PET TUMOR SUBSQ TX STRATEGY | Performed by: STUDENT IN AN ORGANIZED HEALTH CARE EDUCATION/TRAINING PROGRAM

## 2024-02-19 PROCEDURE — A9581 GADOXETATE DISODIUM INJ: HCPCS | Performed by: NURSE PRACTITIONER

## 2024-02-19 RX ADMIN — COPPER CU 64 DOTATATE 3.89 MILLICURIE: 1 INJECTION, SOLUTION INTRAVENOUS at 14:20

## 2024-02-19 RX ADMIN — GADOXETATE DISODIUM 10 ML: 181.43 INJECTION, SOLUTION INTRAVENOUS at 18:17

## 2024-02-23 ENCOUNTER — INFUSION (OUTPATIENT)
Dept: HEMATOLOGY/ONCOLOGY | Facility: HOSPITAL | Age: 44
End: 2024-02-23
Payer: COMMERCIAL

## 2024-02-23 VITALS
HEART RATE: 71 BPM | SYSTOLIC BLOOD PRESSURE: 131 MMHG | RESPIRATION RATE: 16 BRPM | TEMPERATURE: 97.7 F | OXYGEN SATURATION: 97 % | DIASTOLIC BLOOD PRESSURE: 85 MMHG

## 2024-02-23 DIAGNOSIS — C7A.8 NEUROENDOCRINE CANCER (MULTI): ICD-10-CM

## 2024-02-23 PROCEDURE — 2500000004 HC RX 250 GENERAL PHARMACY W/ HCPCS (ALT 636 FOR OP/ED): Mod: JZ,TB | Performed by: NURSE PRACTITIONER

## 2024-02-23 PROCEDURE — 96372 THER/PROPH/DIAG INJ SC/IM: CPT

## 2024-02-23 PROCEDURE — 96372 THER/PROPH/DIAG INJ SC/IM: CPT | Performed by: NURSE PRACTITIONER

## 2024-02-23 RX ORDER — LANREOTIDE ACETATE 120 MG/.5ML
120 INJECTION SUBCUTANEOUS ONCE
Status: CANCELLED | OUTPATIENT
Start: 2024-03-22

## 2024-02-23 RX ORDER — FAMOTIDINE 10 MG/ML
20 INJECTION INTRAVENOUS ONCE AS NEEDED
Status: CANCELLED | OUTPATIENT
Start: 2024-03-22

## 2024-02-23 RX ORDER — ALBUTEROL SULFATE 0.83 MG/ML
3 SOLUTION RESPIRATORY (INHALATION) AS NEEDED
Status: CANCELLED | OUTPATIENT
Start: 2024-03-22

## 2024-02-23 RX ORDER — EPINEPHRINE 0.3 MG/.3ML
0.3 INJECTION SUBCUTANEOUS EVERY 5 MIN PRN
Status: CANCELLED | OUTPATIENT
Start: 2024-03-22

## 2024-02-23 RX ORDER — LANREOTIDE ACETATE 120 MG/.5ML
120 INJECTION SUBCUTANEOUS ONCE
Status: COMPLETED | OUTPATIENT
Start: 2024-02-23 | End: 2024-02-23

## 2024-02-23 RX ORDER — DIPHENHYDRAMINE HYDROCHLORIDE 50 MG/ML
50 INJECTION INTRAMUSCULAR; INTRAVENOUS AS NEEDED
Status: CANCELLED | OUTPATIENT
Start: 2024-03-22

## 2024-02-23 RX ADMIN — LANREOTIDE ACETATE 120 MG: 120 INJECTION SUBCUTANEOUS at 14:51

## 2024-02-23 ASSESSMENT — COLUMBIA-SUICIDE SEVERITY RATING SCALE - C-SSRS
2. HAVE YOU ACTUALLY HAD ANY THOUGHTS OF KILLING YOURSELF?: NO
6. HAVE YOU EVER DONE ANYTHING, STARTED TO DO ANYTHING, OR PREPARED TO DO ANYTHING TO END YOUR LIFE?: NO
1. IN THE PAST MONTH, HAVE YOU WISHED YOU WERE DEAD OR WISHED YOU COULD GO TO SLEEP AND NOT WAKE UP?: NO

## 2024-02-23 ASSESSMENT — PATIENT HEALTH QUESTIONNAIRE - PHQ9
SUM OF ALL RESPONSES TO PHQ9 QUESTIONS 1 & 2: 0
2. FEELING DOWN, DEPRESSED OR HOPELESS: NOT AT ALL
1. LITTLE INTEREST OR PLEASURE IN DOING THINGS: NOT AT ALL

## 2024-02-23 ASSESSMENT — PAIN SCALES - GENERAL: PAINLEVEL: 0-NO PAIN

## 2024-03-06 ENCOUNTER — APPOINTMENT (OUTPATIENT)
Dept: HEMATOLOGY/ONCOLOGY | Facility: HOSPITAL | Age: 44
End: 2024-03-06
Payer: COMMERCIAL

## 2024-03-06 ENCOUNTER — OFFICE VISIT (OUTPATIENT)
Dept: HEMATOLOGY/ONCOLOGY | Facility: HOSPITAL | Age: 44
End: 2024-03-06
Payer: COMMERCIAL

## 2024-03-06 VITALS
TEMPERATURE: 98.8 F | SYSTOLIC BLOOD PRESSURE: 135 MMHG | HEIGHT: 66 IN | DIASTOLIC BLOOD PRESSURE: 83 MMHG | RESPIRATION RATE: 20 BRPM | WEIGHT: 219.05 LBS | BODY MASS INDEX: 35.2 KG/M2 | HEART RATE: 77 BPM

## 2024-03-06 DIAGNOSIS — C7A.8 NEUROENDOCRINE CANCER (MULTI): ICD-10-CM

## 2024-03-06 PROCEDURE — 99215 OFFICE O/P EST HI 40 MIN: CPT | Performed by: INTERNAL MEDICINE

## 2024-03-06 PROCEDURE — 1036F TOBACCO NON-USER: CPT | Performed by: INTERNAL MEDICINE

## 2024-03-06 RX ORDER — FUROSEMIDE 20 MG/1
20 TABLET ORAL AS NEEDED
COMMUNITY

## 2024-03-06 ASSESSMENT — PAIN SCALES - GENERAL: PAINLEVEL: 0-NO PAIN

## 2024-03-06 NOTE — PROGRESS NOTES
Patient ID: Brooke Chu is a 44 y.o. female.  Visit type: Follow up telephone visit    A telephone visit (audio only) between the patient (at the originating site) and the provider (at the distant site) was utilized to provide this telehealth service.   Verbal consent was requested and obtained from Brooke Chu on this date, 03/06/24 for a telehealth visit.       Current Therapy  Treatment Plan:  Lanreotide, Every 28 Days      ONCOLOGIC HISTORY    44-year-old woman with a several year history of neck pain on the Rt side and for exploration they did full imaging include CT neck, chest and AP on 06/30/2020  which showed a large lobulated heterogeneously enhancing mass lesion involving segments 7 and 8 of the right hepatic lobe measuring approximately 13.2 x 10.5 x 9.7 cm in size. 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   MRI showed large hypervascular heterogeneously enhancing hepatic mass lesion in segment 7 and 8 and multiple other satellite hepatic lesions involving right and left hepatic lobes. Stable subcentimeter zayda hepatis lymph nodes. No other significant lymphadenopathy  or free intraperitoneal fluid.  PET-NET showed Dominant centrally necrotic somatostatin receptor expressing mass within hepatic segments 7 and 8. Additional satellite lesions in the bilateral hepatic lobes as described above. Somatostatin avid Left internal iliac lymph nodes. No additional  sites of abnormal Ga-68 dotatate uptake suggestive of malignancy identified.  PET-FDG didn't show any extra glucose uptake  Colonoscopy and SB enteroscopy didn't show any primary lesion. Symptoms of diarrhea and intermittent flushing s/p 1 dose of lanreotide after which she had severe bloating and abdominal pain as well as diarrhea   10/27: She had partial hepatectomy (segment 3, 5, 6), liver ablation  (segment 5, 3), cholecystectomy   She recovered well with surgery. She was tested positive for COVID  and only symptoms she has was loss of taste and stuffy nose.   She started on lanreotide but d/t multiple side effects including gas, bloating, and some hypoglycemia episodes she was switched to Octreotide 30 mg in December of 2021  MRI abd/pelvis 1/7/2021 appears stable but limited imaging d/t inability to do with contrast as IV infiltrated during scan per patient.     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 She has no new symptoms.         ROS  All 14 systems have been reviewed and were negative except for the HPI.      OBJECTIVE:    VS:  There were no vitals taken for this visit.  Weight: There were no vitals filed for this visit.    BSA: There is no height or weight on file to calculate BSA.      General: Appears well and in NAD  Eyes: PERRL, EOMI, clear sclera  ENMT: mucous membranes moist, no apparent injury, no lesions seen  Head/Neck: Neck supple, no apparent injury, thyroid without mass or tenderness, No JVD, trachea midline,   Thorax: Patent airways, CTAB, normal breath sounds with good chest expansion, thorax symmetric  Cardiovascular: Regular, rate and rhythm, no murmurs, 2+ equal pulses of the extremities, normal S 1and S 2  Gastrointestinal: Nondistended, soft, non-tender, no rebound tenderness or guarding, no masses palpable, no organomegaly, +BS  Musculoskeletal: ROM intact, no joint swelling, normal strength  Extremities: normal extremities, no cyanosis edema, contusions or wounds, no clubbing  Neurological: alert and oriented x3, intact senses, motor, response and reflexes, normal strength  Lymphatic: No significant lymphadenopathy  Psychological: Appropriate mood and behavior  Skin: Warm and dry, no lesions, no  raul      Diagnostic Results     Comprehensive Metabolic Panel              Component  Ref Range & Units 2 wk ago  (1/24/24) 4 mo ago  (10/6/23) 7 mo ago  (7/12/23) 10 mo ago  (3/29/23) 1 yr ago  (10/24/22) 1 yr ago  (8/27/22) 1 yr ago  (7/20/22)   Glucose  74 - 99 mg/dL 90 112 High  94 121 High  103 High  97 98   Sodium  136 - 145 mmol/L 140 137 143 140 139 137 136   Potassium  3.5 - 5.3 mmol/L 4.8 4.1 4.1 4.3 3.8 4.1 4.0   Chloride  98 - 107 mmol/L 106 105 108 High  105 108 High  107 103   Bicarbonate  21 - 32 mmol/L 25 25 28 28 23 21 26   Anion Gap  10 - 20 mmol/L 14 11 11 11 12 13 11   Urea Nitrogen  6 - 23 mg/dL 11 15 13 9 10 12 11   Creatinine  0.50 - 1.05 mg/dL 0.73 0.77 0.81 0.63 0.63 0.61 0.74   eGFR  >60 mL/min/1.73m*2 >90 >90 CM        Comment: Calculations of estimated GFR are performed using the 2021 CKD-EPI Study Refit equation without the race variable for the IDMS-Traceable creatinine methods.  https://jasn.asnjournals.org/content/early/2021/09/22/ASN.3668509343   Calcium  8.6 - 10.3 mg/dL 8.9 9.1 9.4 9.4 R 8.7 8.5 Low  8.7   Albumin  3.4 - 5.0 g/dL 4.1 4.1 4.2 4.2 4.0  4.1   Alkaline Phosphatase  33 - 110 U/L 80 73 70 68 63  65   Total Protein  6.4 - 8.2 g/dL 6.5 7.3 7.1 7.0 6.8  7.1   AST  9 - 39 U/L 16 19 14 13 13  20   Bilirubin, Total  0.0 - 1.2 mg/dL 0.4 0.4 0.3 0.4 0.5  0.4   ALT  7 - 45 U/L 15 19 CM 11 CM            Contains abnormal data CBC and Auto Differential                Component  Ref Range & Units 2 wk ago  (1/24/24) 4 mo ago  (10/6/23) 7 mo ago  (7/12/23) 10 mo ago  (3/29/23) 1 yr ago  (10/24/22) 1 yr ago  (7/20/22) 1 yr ago  (7/8/22)   WBC  4.4 - 11.3 x10*3/uL 11.8 High  7.5 9.5 R 7.9 R 7.4 R 6.8 R 9.0 R   nRBC  0.0 - 0.0 /100 WBCs 0.0 0.0        RBC  4.00 - 5.20 x10*6/uL 4.95 4.80 4.73 R 4.79 R 4.55 R 4.54 R 4.71 R   Hemoglobin  12.0 - 16.0 g/dL 14.7 14.6 14.2 14.1 13.6 13.4 14.0   Hematocrit  36.0 - 46.0 % 45.4 44.1 42.5 42.4 41.6 42.1 43.1   MCV  80 - 100 fL 92 92 90 89 91 93  92   MCH  26.0 - 34.0 pg 29.7 30.4        MCHC  32.0 - 36.0 g/dL 32.4 33.1 33.4 33.3 32.7 31.8 Low  32.5   RDW  11.5 - 14.5 % 13.0 12.9 13.5 13.5 13.3 13.2 12.9   Platelets  150 - 450 x10*3/uL 244 229 205 R 217 R 183 R 191 R 220 R   Neutrophils %  40.0 - 80.0 % 67.3 55.6 63.3 55.6 56.8 54.0 64.2   Immature Granulocytes %, Automated  0.0 - 0.9 % 0.4 0.3 CM 0.5 CM 0.4 CM 0.1 CM 0.6 CM 0.2 CM   Comment: Immature Granulocyte Count (IG) includes promyelocytes, myelocytes and metamyelocytes but does not include bands. Percent differential counts (%) should be interpreted in the context of the absolute cell counts (cells/UL).   Lymphocytes %  13.0 - 44.0 % 23.3 33.8 26.2 33.2 31.8 34.2 25.6 CM   Monocytes %  2.0 - 10.0 % 6.9 7.5 7.2 7.9 8.4 7.9 7.6   Eosinophils %  0.0 - 6.0 % 1.6 2.0 2.3 2.3 2.4 2.3 2.0   Basophils %  0.0 - 2.0 % 0.5 0.8 0.5 0.6 0.5 1.0 0.4   Neutrophils Absolute  1.20 - 7.70 x10*3/uL 7.90 High  4.16 CM 6.02 R 4.41 R 4.20 R 3.67 R 5.78 R   Comment: Percent differential counts (%) should be interpreted in the context of the absolute cell counts (cells/uL).   Immature Granulocytes Absolute, Automated  0.00 - 0.70 x10*3/uL 0.05 0.02        Lymphocytes Absolute  1.20 - 4.80 x10*3/uL 2.74 2.53 2.49 R 2.64 R 2.35 R 2.33 R 2.30 R   Monocytes Absolute  0.10 - 1.00 x10*3/uL 0.81 0.56 0.69 R 0.63 R 0.62 R 0.54 R 0.68 R   Eosinophils Absolute  0.00 - 0.70 x10*3/uL 0.19 0.15 0.22 R 0.18 R 0.18 R 0.16 R 0.18 R   Basophils Absolute  0.00 - 0.10 x10*3/uL 0.06 0.06 0.05 R 0.05 R 0.04 R 0.07 R 0.04 R   MPV  11.1 R             5-Hydroxyindoleacetic Acid,Plasma          Component  Ref Range & Units 2 wk ago 4 mo ago 10 mo ago   5-Hydroxyindoleacetic Acid (5-HIAA),Plasma  ng/mL 5.7 6.2 CM 3.3 CM          MR liver w EOVIST contrast  Narrative: Interpreted By:  Jesus Calixto,  and Maggie Priest   STUDY:  MR LIVER WITH EOVIST CONTRAST;  2/19/2024 6:44 pm      INDICATION:  Signs/Symptoms:Metastatic well diff. NET of unknown  primary with  liver lesions on recent scan.      COMPARISON:  MRI of the abdomen dated 01/02/2024, 10/14/2023, and 07/03/2023.  PET-CT dated 02/19/2024 and 09/25/2020      ACCESSION NUMBER(S):  [id number]      ORDERING CLINICIAN:  SKYLA GIMENEZ      TECHNIQUE:  MRI LIVER; Multiplanar magnetic resonance images of the abdomen were  obtained including the following sequences; T2-weighted SSFSE with  and without fat saturation, T1-weighted GRE in/opposed phase, DWI,  fat saturated 3D-T1w GRE pre and dynamically post contrast.  10 ml of  Gadolinium contrast agent Dotarem were administered intravenously  without immediate complication.      FINDINGS:  LIVER:  The liver measures 11.6 cm in craniocaudal dimension with smooth  surface contour.      Status post partial hepatectomy involving segments 3, 5, and 6 with  stable posttreatment changes within the hepatic dome demonstrating  susceptibility artifact.      Redemonstration of a 5 mm diffusion restricting lesion within hepatic  segment 8 (series 9, image 120) which demonstrates enhancement on 70  second post-contrast imaging (series 20, image 14) and is similar  compared to prior MRI dated 10/14/2023.      Unchanged appearance of a 7 mm diffusion restricting lesion within  hepatic segment 5 without enhancement on postcontrast images.      Unchanged appearance of a cystic lesion at the dome of liver within  hepatic segment 8 measuring 0.8 cm (series 27, image 10) without  enhancement on postcontrast images.      There are no new diffusion restricting or enhancing hepatic lesions.      Hepatic parenchyma is isointense on T1 in and out of phase sequences.      BILE DUCTS:  No intrahepatic or extrahepatic bile duct dilatation is demonstrated.      GALLBLADDER:  The gallbladder is surgically absent.      PANCREAS:  Normal signal intensity. Normal enhancement. No masses. The  pancreatic duct is normal.      SPLEEN:  The spleen is normal in size without evidence of focal  lesions.      ADRENAL GLANDS:  Within normal limits.      KIDNEYS:  Redemonstration of a 0.8 cm T2 hyperintense cystic lesion within the  superior pole of the left kidney without internal septation, solid  component or enhancement compatible with simple renal cysts. The  kidneys are otherwise normal in size and enhance symmetrically  without lesion.      LYMPH NODES:  No lymphadenopathy.      ABDOMINAL VESSELS:  Aorta and the major abdominal arterial vessels demonstrate no gross  abnormality.  Superior mesenteric vein, splenic vein, and main, right  and left portal vein are patent. Hepatic veins are patent.  No  significant collaterals or esophageal varices are present.      BOWEL:  Within normal limits.      PERITONEUM/RETROPERITONEUM:  No ascites.      BONES AND LOWER THORAX:  No suspicious osseous lesions. The lower lungs and visualized portion  of the heart appear within normal limits.      Impression: 1.  Status post partial right hepatectomy with stable diffusion  restricting and enhancing hepatic segment 8 lesion and stable  arterial enhancing hepatic segment 5 lesion; these correspond with  foci of activity on same day PET-CT. No new hepatic lesions or  evidence of metastatic disease within the abdomen. Continued  attention on follow-up is recommended.  2. No abdominal lymphadenopathy.      I personally reviewed the images/study with Cem Serrano MD (Radiology  Resident) and I agree with the findings as stated. This study was  interpreted at [place], [place].      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 The possibility of origin in pancreas and duodenum should be excluded.   MRI showed large hypervascular heterogeneously enhancing hepatic mass lesion in segment 7 and 8 and multiple other satellite hepatic lesions involving right and left hepatic lobes. Stable subcentimeter zayda hepatis lymph nodes. No other significant lymphadenopathy  or free intraperitoneal fluid.  PET-NET showed Dominant centrally necrotic somatostatin receptor expressing mass within hepatic segments 7 and 8. Additional satellite lesions in the bilateral hepatic lobes as described above. Somatostatin avid Left internal iliac lymph nodes. No additional  sites of abnormal Ga-68 dotatate uptake suggestive of malignancy identified.  PET-FDG didnt show any extra glucose uptake  Colonoscopy and SB enteroscopy didn't show any primary lesion  10/27/2020: She had partial hepatectomy (segment 3, 5, 6), liver ablation (segment 5, 3), cholecystectomy and pth confirmed G2 well diff NET (kI67 3.1%)    She recovered well with surgery. She was tested positive for COVID  and only symptoms she has was loss of taste and stuffy nose.   04/26/2021: Restaging CT scan showed post surgical changes and postablation changes in the liver with no new lesions  MRI showed At least 4 foci of restricted diffusion are identified in segments 8 and 3 without correlating abnormality on other sequences. However 2 of these foci correlate with hypervascular foci and a prior CT scan.  Findings are nonspecific, however  metastatic foci cannot be excluded  MRI showed stable disease on 08/16/2021  PET-Ga68 on 11/2021 showed multiple small (3-4) somatostatin avid foci are noted scattered throughout  "the hepatic  parenchyma which appear stable number and relative intensity compared to the prior exam   Octreotide 30 mg started 12/2021 as she was not tolerating lanreotide well with multiple side effects.  MRI Brain 12/13 done for recurrent headaches/ to rule out mets- Scan WNL  MRI abd/pelvis 1/7/2021 appears stable but limited imaging d/t inability to do with contrast as IV infiltrated during scan per patient.   Repeated MRI on 03/2022: Showed stable disease with one new liver lesion   Repeated MRI on 07/23/2022: Showed stable disease   PET scan on 10/14/2022 shows stable disease  MRI AP on 3/6/2023 shows stable disease. 5HIAA 3.3 on 3/29/23  MRI AP on 7/3/2023 shows stable disease  MRI AP on 10/14/23 shows: \"Status post partial right hepatectomy. Stable indeterminate hepatic arterial enhancing foci as detailed above. No new suspicious lesions.\"  MRI AP on 1/2/24 shows: Few small subcentimeter arterially enhancing foci without signal abnormality on other postcontrast sequences or anatomic sequences  Patient presented to TB with recommendations: \"Liver foci are very small and not sure if these are new NETs. MRI w eovist to compare with MRI on 10/2023 and PET-CU64. Resume SSA and if PET-CU64 only showed liver lesions will proceed with surgery vs ablation\"  PET-CU64 showed stable multiple somatostatin avid lesions are scattered throughout the liver  MRI w eovist showed enhancing hepatic segment 8 lesion and stable arterial enhancing hepatic segment 5 lesion    Plan:    -Patient has no  new complaints  -Continue monthly SSA with lanreotide  -MWA for the liver lesions   -RTC after scans in 3 months for follow up and to review scan results      Sotero Arango M.D.   and Director of the Neuroendocrine Tumor Program  Gastrointestinal Medical Oncology  Department of Hematology and Oncology  [facility], [address], " [place] [id number]  Phone (Office): [phone]  Fax:  [phone]   Email: [email or web address]  Learn more about [facility] Comprehensive Neuroendocrine Tumor Program: Click Here  Learn more about [place] Neuroend[employer]: [email or web address]

## 2024-03-06 NOTE — PROGRESS NOTES
Patient here with her  for follow up with Dr Brunner. She reports diarrhea and abd pain s/p Lanreotide injections. Plan to be scheduled with IR for MWA to liver lesions, have Lanreotide injection in March and then discontinue Lanreotide injections. Follow up 3 months with scans.  Patient and spouse taken to scheduling. Sandie Be RN

## 2024-03-11 ENCOUNTER — HOSPITAL ENCOUNTER (OUTPATIENT)
Dept: RADIOLOGY | Facility: HOSPITAL | Age: 44
Discharge: HOME | End: 2024-03-11
Payer: COMMERCIAL

## 2024-03-11 ENCOUNTER — PREP FOR PROCEDURE (OUTPATIENT)
Dept: RADIOLOGY | Facility: HOSPITAL | Age: 44
End: 2024-03-11

## 2024-03-11 VITALS
DIASTOLIC BLOOD PRESSURE: 79 MMHG | SYSTOLIC BLOOD PRESSURE: 140 MMHG | RESPIRATION RATE: 18 BRPM | HEART RATE: 70 BPM | TEMPERATURE: 98.1 F | OXYGEN SATURATION: 100 %

## 2024-03-11 DIAGNOSIS — R16.0 LIVER MASS: ICD-10-CM

## 2024-03-11 DIAGNOSIS — C7A.8 NEUROENDOCRINE CANCER (MULTI): ICD-10-CM

## 2024-03-11 DIAGNOSIS — C7A.8 NEUROENDOCRINE CANCER (MULTI): Primary | ICD-10-CM

## 2024-03-11 DIAGNOSIS — C7B.8 METASTATIC MALIGNANT NEUROENDOCRINE TUMOR TO LIVER (MULTI): Primary | ICD-10-CM

## 2024-03-11 PROCEDURE — 76705 ECHO EXAM OF ABDOMEN: CPT | Performed by: RADIOLOGY

## 2024-03-11 PROCEDURE — 76999 ECHO EXAMINATION PROCEDURE: CPT

## 2024-03-11 PROCEDURE — 76978 US TRGT DYN MBUBB 1ST LES: CPT

## 2024-03-11 PROCEDURE — 99204 OFFICE O/P NEW MOD 45 MIN: CPT | Performed by: NURSE PRACTITIONER

## 2024-03-11 PROCEDURE — 2550000001 HC RX 255 CONTRASTS: Performed by: RADIOLOGY

## 2024-03-11 RX ADMIN — SULFUR HEXAFLUORIDE 24.28 MG: KIT at 15:30

## 2024-03-11 ASSESSMENT — ENCOUNTER SYMPTOMS
PSYCHIATRIC NEGATIVE: 1
NEUROLOGICAL NEGATIVE: 1
GASTROINTESTINAL NEGATIVE: 1
MUSCULOSKELETAL NEGATIVE: 1
RESPIRATORY NEGATIVE: 1
CONSTITUTIONAL NEGATIVE: 1
CARDIOVASCULAR NEGATIVE: 1

## 2024-03-11 NOTE — CONSULTS
INTERVENTIONAL RADIOLOGY OUTPATIENT CONSULTATION  [facility]    Subjective  Brooke Chu, 44 y.o. 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 The possibility of origin in pancreas and duodenum should be excluded.   MRI showed large hypervascular heterogeneously enhancing hepatic mass lesion in segment 7 and 8 and multiple other satellite hepatic lesions involving right and left hepatic lobes. Stable subcentimeter zayda hepatis lymph nodes. No other significant lymphadenopathy  or free intraperitoneal fluid.  PET-NET showed Dominant centrally necrotic somatostatin receptor expressing mass within hepatic segments 7 and 8. Additional satellite lesions in the bilateral hepatic lobes as described above. Somatostatin avid Left internal iliac lymph nodes. No additional  sites of abnormal Ga-68 dotatate uptake suggestive of malignancy identified.  PET-FDG didnt show any extra glucose uptake  Colonoscopy and SB enteroscopy didn't show any primary lesion  10/27/2020: She had partial hepatectomy (segment 3, 5, 6), liver ablation (segment 5, 3), cholecystectomy and pth confirmed G2 well diff NET (kI67 3.1%)      Patient of Dr. Arango referred to IR for ablation of liver lesions. She states liver resection was in roughly 2021 and is unsure if they did ablation at time of resection. She has no complaints  today.    Interventional Radiology has been consulted for: ablation of liver lesions    Review of Systems   Constitutional: Negative.    Respiratory: Negative.     Cardiovascular: Negative.    Gastrointestinal: Negative.    Musculoskeletal: Negative.    Skin: Negative.    Neurological: Negative.    Psychiatric/Behavioral: Negative.         Past Medical History:   Diagnosis Date    Other bursal cyst, unspecified wrist     Synovial cyst of wrist     Past Surgical History:   Procedure Laterality Date    CT GUIDED PERCUTANEOUS PERITONEAL OR RETROPERITONEAL FLUID COLLECTION DRAINAGE  11/3/2020    CT GUIDED PERCUTANEOUS PERITONEAL OR RETROPERITONEAL FLUID COLLECTION DRAINAGE 11/3/2020 [facility] [place]    OTHER SURGICAL HISTORY  06/29/2020    Wrist surgery    OTHER SURGICAL HISTORY  03/21/2022    Tubal ligation bilateral    OTHER SURGICAL HISTORY  08/24/2022    Neck surgery    OTHER SURGICAL HISTORY  08/24/2022    Gallbladder surgery    OTHER SURGICAL HISTORY  11/14/2020    Hepatectomy partial    OTHER SURGICAL HISTORY  11/14/2020    Liver tumor ablation    OTHER SURGICAL HISTORY  11/14/2020    Cholecystectomy    US GUIDED NEEDLE LIVER BIOPSY  7/23/2020    US GUIDED NEEDLE LIVER BIOPSY 7/23/2020 [facility] [place]     Social History     Socioeconomic History    Marital status:      Spouse name: Not on file    Number of children: Not on file    Years of education: Not on file    Highest education level: Not on file   Occupational History    Not on file   Tobacco Use    Smoking status: Never     Passive exposure: Never    Smokeless tobacco: Never   Vaping Use    Vaping Use: Never used   Substance and Sexual Activity    Alcohol use: Never    Drug use: Never    Sexual activity: Not on file   Other Topics Concern    Not on file   Social History Narrative    Not on file     Social Determinants of Health     Financial Resource Strain: Not on file   Food Insecurity: Not on file   Transportation Needs: Not on file   Physical  Activity: Not on file   Stress: No Stress Concern Present (2/23/2024)    [employer] [place] [employer] - Occupational Stress Questionnaire     Feeling of Stress : Not at all   Social Connections: Not on file   Intimate Partner Violence: Not on file   Housing Stability: Not on file     Family History   Problem Relation Name Age of Onset    Other (adult respiratory distress sydrome) Mother      Other (cardiac disorder) Mother      COPD Mother      Crohn's disease Mother      Hypothyroidism Mother      Hyperthyroidism Mother      Heart attack Mother      Cerebral aneurysm Father      Hyperthyroidism Other Grandmother     Hypothyroidism Other Grandmother     Diabetes Other Grandfather     Diabetes Other Aunt     Hyperthyroidism Other Aunt     Hypothyroidism Other Aunt     Diabetes Paternal Great-Grandmother       Allergies   Allergen Reactions    Methylprednisolone Other and Unknown     Abdominal pain    Other Unknown     steroids     Current Outpatient Medications on File Prior to Encounter   Medication Sig Dispense Refill    furosemide (LASIX ORAL) Take by mouth if needed.      ibuprofen 200 mg tablet Take 1 tablet (200 mg) by mouth 3 times a day as needed. with food or milk      lanreotide (Somatuline [place]) 120 mg/0.5 mL syringe Inject under the skin.       No current facility-administered medications on file prior to encounter.       Objective    Vitals:    03/11/24 1047   BP: 140/79   BP Location: Left arm   Patient Position: Sitting   Pulse: 70   Resp: 18   Temp: 36.7 °C (98.1 °F)   TempSrc: Temporal   SpO2: 100%       Physical Exam  Constitutional:       Appearance: Normal appearance. She is normal weight.   Cardiovascular:      Rate and Rhythm: Normal rate and regular rhythm.      Pulses: Normal pulses.      Heart sounds: Normal heart sounds.   Pulmonary:      Effort: Pulmonary effort is normal.      Breath sounds: Normal breath sounds.   Abdominal:      General: Bowel sounds are normal.       Palpations: Abdomen is soft.   Musculoskeletal:         General: Normal range of motion.      Cervical back: Normal range of motion and neck supple.   Skin:     General: Skin is warm and dry.   Neurological:      General: No focal deficit present.      Mental Status: She is alert and oriented to person, place, and time. Mental status is at baseline.   Psychiatric:         Mood and Affect: Mood normal.         Behavior: Behavior normal.         Thought Content: Thought content normal.         Judgment: Judgment normal.         Pertinent Imaging  MRI Liver 2/19/24:  IMPRESSION:  1.  Status post partial right hepatectomy with stable diffusion  restricting and enhancing hepatic segment 8 lesion and stable  arterial enhancing hepatic segment 5 lesion; these correspond with  foci of activity on same day PET-CT. No new hepatic lesions or  evidence of metastatic disease within the abdomen. Continued  attention on follow-up is recommended.  2. No abdominal lymphadenopathy.    Ultrasound with contrast was performed today, please refer to imaging study for further detail.    Assessment & Plan     Review of noted imaging reveals 3 lesions within liver, all subcentimeter.    Current plan: In clinic today we discussed plan to ablate liver lesions including risks, benefits, and what to expect day of procedure. Contact information provided to patient to follow up as needed. Patient will be scheduled for liver ablation with general anesthesia.    Case seen and staffed with TON Wiley MD    Vascular and Interventional Radiology  [facility]  [phone] Nursing  [phone] Scheduling

## 2024-03-12 ENCOUNTER — TELEPHONE (OUTPATIENT)
Dept: ADMISSION | Facility: HOSPITAL | Age: 44
End: 2024-03-12
Payer: COMMERCIAL

## 2024-03-12 DIAGNOSIS — C7A.8 NEUROENDOCRINE CANCER (MULTI): Primary | ICD-10-CM

## 2024-03-12 NOTE — TELEPHONE ENCOUNTER
Per Dr. Conchita Cox is to continue her Lanreotide injects. Rn informed her however she believes he told her that the injections would be discontinued.   She wants the team to be aware that she believes that her GI symptoms are related to her injection and is concerned about continuing to have these side effects. Brooke is also under the impression that her tumors are no longer secreting hormones and she therefore would no longer need the Lanreotide.    Message sent to Hem Onc team to further clarify that Lanreotide injections are still necessary.

## 2024-03-12 NOTE — TELEPHONE ENCOUNTER
Brooke called the office because she was told at her 3/6 [facility] that her Lanreotide injections could be stopped as she is having an ablation in the next few weeks.  Per clinic note it states to continue monthly injections.  She would like to clarify her poc.  If injections are being discontinued she needs them cancelled from her schedule.

## 2024-03-14 NOTE — TELEPHONE ENCOUNTER
Per Eva Flanagan CNP, pt agreeable to phone visit with Dr. Arango 3/19/24. Scheduling order placed.

## 2024-03-14 NOTE — TELEPHONE ENCOUNTER
Pt called to follow up, aware awaiting response from team.  If she is to continue lanreotide, she asks for further clarification on the rationale for doing so since that plan is different from what was discussed at her last appointment.

## 2024-03-18 ENCOUNTER — TELEPHONE (OUTPATIENT)
Dept: HEMATOLOGY/ONCOLOGY | Facility: CLINIC | Age: 44
End: 2024-03-18
Payer: COMMERCIAL

## 2024-03-18 NOTE — TELEPHONE ENCOUNTER
After speaking with Dr Arango in regards to patient's POC - patient will receive next Lanreotide injection on 3/22/24 and then discontinue injections at this time. Appt on 3/20 not needed with Dr Arango. Plan to follow up in June with Dr Arango s/p ablation and MRIs. Patient verbalized understanding and agreement with this plan. Appts changed to reflect this. Sandie Be RN

## 2024-03-20 ENCOUNTER — APPOINTMENT (OUTPATIENT)
Dept: HEMATOLOGY/ONCOLOGY | Facility: HOSPITAL | Age: 44
End: 2024-03-20
Payer: COMMERCIAL

## 2024-03-22 ENCOUNTER — APPOINTMENT (OUTPATIENT)
Dept: HEMATOLOGY/ONCOLOGY | Facility: HOSPITAL | Age: 44
End: 2024-03-22
Payer: COMMERCIAL

## 2024-03-22 ENCOUNTER — INFUSION (OUTPATIENT)
Dept: HEMATOLOGY/ONCOLOGY | Facility: HOSPITAL | Age: 44
End: 2024-03-22
Payer: COMMERCIAL

## 2024-03-22 VITALS
SYSTOLIC BLOOD PRESSURE: 138 MMHG | RESPIRATION RATE: 17 BRPM | HEART RATE: 77 BPM | TEMPERATURE: 99 F | DIASTOLIC BLOOD PRESSURE: 72 MMHG | OXYGEN SATURATION: 98 %

## 2024-03-22 DIAGNOSIS — C7A.8 NEUROENDOCRINE CANCER (MULTI): ICD-10-CM

## 2024-03-22 PROCEDURE — 96372 THER/PROPH/DIAG INJ SC/IM: CPT | Performed by: NURSE PRACTITIONER

## 2024-03-22 PROCEDURE — 96372 THER/PROPH/DIAG INJ SC/IM: CPT

## 2024-03-22 PROCEDURE — 2500000004 HC RX 250 GENERAL PHARMACY W/ HCPCS (ALT 636 FOR OP/ED): Mod: JZ,TB | Performed by: NURSE PRACTITIONER

## 2024-03-22 RX ORDER — EPINEPHRINE 0.3 MG/.3ML
0.3 INJECTION SUBCUTANEOUS EVERY 5 MIN PRN
OUTPATIENT
Start: 2024-04-19

## 2024-03-22 RX ORDER — LANREOTIDE ACETATE 120 MG/.5ML
120 INJECTION SUBCUTANEOUS ONCE
OUTPATIENT
Start: 2024-04-19

## 2024-03-22 RX ORDER — DIPHENHYDRAMINE HYDROCHLORIDE 50 MG/ML
50 INJECTION INTRAMUSCULAR; INTRAVENOUS AS NEEDED
OUTPATIENT
Start: 2024-04-19

## 2024-03-22 RX ORDER — LANREOTIDE ACETATE 120 MG/.5ML
120 INJECTION SUBCUTANEOUS ONCE
Status: COMPLETED | OUTPATIENT
Start: 2024-03-22 | End: 2024-03-22

## 2024-03-22 RX ORDER — ALBUTEROL SULFATE 0.83 MG/ML
3 SOLUTION RESPIRATORY (INHALATION) AS NEEDED
OUTPATIENT
Start: 2024-04-19

## 2024-03-22 RX ORDER — FAMOTIDINE 10 MG/ML
20 INJECTION INTRAVENOUS ONCE AS NEEDED
OUTPATIENT
Start: 2024-04-19

## 2024-03-22 RX ADMIN — LANREOTIDE ACETATE 120 MG: 120 INJECTION SUBCUTANEOUS at 13:42

## 2024-03-22 ASSESSMENT — ENCOUNTER SYMPTOMS
DEPRESSION: 0
OCCASIONAL FEELINGS OF UNSTEADINESS: 0
LOSS OF SENSATION IN FEET: 0

## 2024-03-22 ASSESSMENT — PATIENT HEALTH QUESTIONNAIRE - PHQ9
1. LITTLE INTEREST OR PLEASURE IN DOING THINGS: NOT AT ALL
SUM OF ALL RESPONSES TO PHQ9 QUESTIONS 1 & 2: 0
2. FEELING DOWN, DEPRESSED OR HOPELESS: NOT AT ALL

## 2024-03-22 ASSESSMENT — PAIN SCALES - GENERAL: PAINLEVEL: 2

## 2024-04-05 ENCOUNTER — PRE-ADMISSION TESTING (OUTPATIENT)
Dept: PREADMISSION TESTING | Facility: HOSPITAL | Age: 44
End: 2024-04-05
Payer: COMMERCIAL

## 2024-04-05 VITALS
HEART RATE: 72 BPM | OXYGEN SATURATION: 98 % | HEIGHT: 66 IN | DIASTOLIC BLOOD PRESSURE: 80 MMHG | SYSTOLIC BLOOD PRESSURE: 112 MMHG | WEIGHT: 220.9 LBS | TEMPERATURE: 98 F | BODY MASS INDEX: 35.5 KG/M2

## 2024-04-05 DIAGNOSIS — Z01.818 PREOPERATIVE TESTING: Primary | ICD-10-CM

## 2024-04-05 DIAGNOSIS — R16.0 LIVER MASS: ICD-10-CM

## 2024-04-05 DIAGNOSIS — C7B.8 METASTATIC MALIGNANT NEUROENDOCRINE TUMOR TO LIVER (MULTI): ICD-10-CM

## 2024-04-05 DIAGNOSIS — C7A.8 NEUROENDOCRINE CANCER (MULTI): ICD-10-CM

## 2024-04-05 LAB
ALBUMIN SERPL BCP-MCNC: 4.1 G/DL (ref 3.4–5)
ALP SERPL-CCNC: 78 U/L (ref 33–110)
ALT SERPL W P-5'-P-CCNC: 20 U/L (ref 7–45)
ANION GAP SERPL CALC-SCNC: 16 MMOL/L (ref 10–20)
APTT PPP: 29 SECONDS (ref 27–38)
AST SERPL W P-5'-P-CCNC: 22 U/L (ref 9–39)
BILIRUB SERPL-MCNC: 0.4 MG/DL (ref 0–1.2)
BUN SERPL-MCNC: 9 MG/DL (ref 6–23)
CALCIUM SERPL-MCNC: 9 MG/DL (ref 8.6–10.6)
CHLORIDE SERPL-SCNC: 105 MMOL/L (ref 98–107)
CO2 SERPL-SCNC: 21 MMOL/L (ref 21–32)
CREAT SERPL-MCNC: 0.59 MG/DL (ref 0.5–1.05)
EGFRCR SERPLBLD CKD-EPI 2021: >90 ML/MIN/1.73M*2
ERYTHROCYTE [DISTWIDTH] IN BLOOD BY AUTOMATED COUNT: 13.3 % (ref 11.5–14.5)
GLUCOSE SERPL-MCNC: 82 MG/DL (ref 74–99)
HCT VFR BLD AUTO: 46.5 % (ref 36–46)
HGB BLD-MCNC: 15.1 G/DL (ref 12–16)
INR PPP: 1 (ref 0.9–1.1)
MCH RBC QN AUTO: 29 PG (ref 26–34)
MCHC RBC AUTO-ENTMCNC: 32.5 G/DL (ref 32–36)
MCV RBC AUTO: 89 FL (ref 80–100)
NRBC BLD-RTO: 0 /100 WBCS (ref 0–0)
PLATELET # BLD AUTO: 208 X10*3/UL (ref 150–450)
POTASSIUM SERPL-SCNC: 4.4 MMOL/L (ref 3.5–5.3)
PROT SERPL-MCNC: 6.8 G/DL (ref 6.4–8.2)
PROTHROMBIN TIME: 10.7 SECONDS (ref 9.8–12.8)
RBC # BLD AUTO: 5.2 X10*6/UL (ref 4–5.2)
SODIUM SERPL-SCNC: 138 MMOL/L (ref 136–145)
WBC # BLD AUTO: 4.9 X10*3/UL (ref 4.4–11.3)

## 2024-04-05 PROCEDURE — 36415 COLL VENOUS BLD VENIPUNCTURE: CPT

## 2024-04-05 PROCEDURE — 85610 PROTHROMBIN TIME: CPT

## 2024-04-05 PROCEDURE — 84075 ASSAY ALKALINE PHOSPHATASE: CPT

## 2024-04-05 PROCEDURE — 99204 OFFICE O/P NEW MOD 45 MIN: CPT | Performed by: NURSE PRACTITIONER

## 2024-04-05 PROCEDURE — 85027 COMPLETE CBC AUTOMATED: CPT

## 2024-04-05 RX ORDER — FEXOFENADINE HCL 60 MG
60 TABLET ORAL DAILY
COMMUNITY

## 2024-04-05 ASSESSMENT — DUKE ACTIVITY SCORE INDEX (DASI)
TOTAL_SCORE: 58.2
CAN YOU CLIMB A FLIGHT OF STAIRS OR WALK UP A HILL: YES
CAN YOU WALK INDOORS, SUCH AS AROUND YOUR HOUSE: YES
CAN YOU DO LIGHT WORK AROUND THE HOUSE LIKE DUSTING OR WASHING DISHES: YES
CAN YOU TAKE CARE OF YOURSELF (EAT, DRESS, BATHE, OR USE TOILET): YES
CAN YOU DO YARD WORK LIKE RAKING LEAVES, WEEDING OR PUSHING A MOWER: YES
DASI METS SCORE: 9.9
CAN YOU DO HEAVY WORK AROUND THE HOUSE LIKE SCRUBBING FLOORS OR LIFTING AND MOVING HEAVY FURNITURE: YES
CAN YOU HAVE SEXUAL RELATIONS: YES
CAN YOU PARTICIPATE IN STRENOUS SPORTS LIKE SWIMMING, SINGLES TENNIS, FOOTBALL, BASKETBALL, OR SKIING: YES
CAN YOU WALK A BLOCK OR TWO ON LEVEL GROUND: YES
CAN YOU PARTICIPATE IN MODERATE RECREATIONAL ACTIVITIES LIKE GOLF, BOWLING, DANCING, DOUBLES TENNIS OR THROWING A BASEBALL OR FOOTBALL: YES
CAN YOU DO MODERATE WORK AROUND THE HOUSE LIKE VACUUMING, SWEEPING FLOORS OR CARRYING GROCERIES: YES
CAN YOU RUN A SHORT DISTANCE: YES

## 2024-04-05 ASSESSMENT — CHADS2 SCORE
CHADS2 SCORE: 0
AGE GREATER THAN OR EQUAL TO 75: NO
HYPERTENSION: NO
DIABETES: NO
CHF: NO
PRIOR STROKE OR TIA OR THROMBOEMBOLISM: NO

## 2024-04-05 ASSESSMENT — ENCOUNTER SYMPTOMS
CONSTITUTIONAL NEGATIVE: 1
RESPIRATORY NEGATIVE: 1
ENDOCRINE NEGATIVE: 1
EYES NEGATIVE: 1
DIARRHEA: 1
NECK STIFFNESS: 1
CARDIOVASCULAR NEGATIVE: 1
LIGHT-HEADEDNESS: 1
NECK NEGATIVE: 1
NAUSEA: 1
CONSTIPATION: 1

## 2024-04-05 ASSESSMENT — LIFESTYLE VARIABLES: SMOKING_STATUS: NONSMOKER

## 2024-04-05 NOTE — PREPROCEDURE INSTRUCTIONS
Fasting Guidelines    NPO Instructions:    Do not eat any food after midnight the night before your surgery/procedure.  You may have up to TEN ounces of clear liquids until TWO hours before your instructed arrival time to the hospital. This includes water, black tea/coffee, (no milk or cream), apple juice, and/or electrolyte drinks (Gatorade).  You may chew gum up to TWO hours before your surgery/procedure.    Additional Instructions:    Avoid herbal supplements, multivitamins and NSAIDS (non-steroidal anti-inflammatory drugs) such as Advil, Aleve, Ibuprofen, Naproxen, Excedrin, Meloxicam or Celebrex for at least 7 days prior to surgery. May take Tylenol as needed.    Avoid tobacco and alcohol products for 24 hours prior to surgery.    CONTACT SURGEON'S OFFICE IF YOU DEVELOP:  * Fever = 100.4 F   * New respiratory symptoms (e.g. cough, shortness of breath, respiratory distress, sore throat)  * Recent loss of taste or smell  *Flu like symptoms such as headache, fatigue or gastrointestinal symptoms  * You develop any open sores, shingles, burning or painful urination   AND/OR:  * You no longer wish to have the surgery.  * Any other personal circumstances change that may lead to the need to cancel or defer this surgery.  *You were admitted to any hospital within one week of your planned procedure.    Seven/Six Days before Surgery:  Review your medication instructions, stop indicated medications    Day of Surgery:  Review your medication instructions, take indicated medications  Wear comfortable loose fitting clothing  Do not use moisturizers, creams, lotions or perfume  All jewelry and valuables should be left at home    [facility]  [facility]  [phone]  Pyr-[phone]  [email or web address]    [facility]  [phone]       Preoperative Brain Exercises    What are brain exercises?  A brain exercise is any activity that  engages your thinking (cognitive) skills.    What types of activities are considered brain exercises?  Jigsaw puzzles, crossword puzzles, word jumble, memory games, word search, and many more.  Many can be found free online or on your phone via a mobile agus.    Why should I do brain exercises before my surgery?  More recent research has shown brain exercise before surgery can lower the risk of postoperative delirium (confusion) which can be especially important for older adults.  Patients who did brain exercises for 5 to 10 hours the days before surgery, cut their risk of postoperative delirium in half up to 1 week after surgery.         [facility]    Preoperative Deep Breathing Exercises    Why it is important to do deep breathing exercises before my surgery?  Deep breathing exercises strengthen your breathing muscles.  This helps you to recover after your surgery and decreases the chance of breathing complications.      How are the deep breathing exercises done?  Sit straight with your back supported.  Breathe in deeply and slowly through your nose. Your lower rib cage should expand and your abdomen may move forward.  Hold that breath for 3 to 5 seconds.  Breathe out through pursed lips, slowly and completely.  Rest and repeat 10 times every hour while awake.  Rest longer if you become dizzy or lightheaded.         Patient and Family Education             Ways You Can Help Prevent Blood Clots             This handout explains some simple things you can do to help prevent blood clots.      Blood clots are blockages that can form in the body's veins. When a blood clot forms in your deep veins, it may be called a deep vein thrombosis, or DVT for short. Blood clots can happen in any part of the body where blood flows, but they are most common in the arms and legs. If a piece of a blood clot breaks free and travels to the lungs, it is called a pulmonary embolus (PE). A PE can be a very  serious problem.         Being in the hospital or having surgery can raise your chances of getting a blood clot because you may not be well enough to move around as much as you normally do.         Ways you can help prevent blood clots in the hospital         Wearing SCDs. SCDs stands for Sequential Compression Devices.   SCDs are special sleeves that wrap around your legs  They attach to a pump that fills them with air to gently squeeze your legs every few minutes.   This helps return the blood in your legs to your heart.   SCDs should only be taken off when walking or bathing.   SCDs may not be comfortable, but they can help save your life.               Wearing compression stockings - if your doctor orders them. These special snug fitting stockings gently squeeze your legs to help blood flow.       Walking. Walking helps move the blood in your legs.   If your doctor says it is ok, try walking the halls at least   5 times a day. Ask us to help you get up, so you don't fall.      Taking any blood thinning medicines your doctor orders.        Page 1 of 2     [facility]; 3/23   Ways you can help prevent blood clots at home       Wearing compression stockings - if your doctor orders them. ? Walking - to help move the blood in your legs.       Taking any blood thinning medicines your doctor orders.      Signs of a blood clot or PE      Tell your doctor or nurse know right away if you have of the problems listed below.    If you are at home, seek medical care right away. Call 911 for chest pain or problems breathing.               Signs of a blood clot (DVT) - such as pain,  swelling, redness or warmth in your arm or leg      Signs of a pulmonary embolism (PE) - such as chest     pain or feeling short of breath

## 2024-04-05 NOTE — CPM/PAT H&P
CPM/PAT Evaluation       Name: Brooke Chu (Brooke Chu)  /Age: 1980/44 y.o.     Visit Type:   In-Person       Chief Complaint: neuroendocrine tumor    HPI  The patient is a 44 year old female with history of pth confirmed G2 well differentiated neuroendocrine tumor. She is s/p partial hepatectomy, liver ablation, cholecystectomy 10/27//2020. She started on lanreotide but d/t multiple side effects including gas, bloating, and some hypoglycemia episodes she was switched to Octreotide 30 mg in 2021. She had recent MRI that showed large hypervascular heterogeneously enhancing hepatic mass lesion in segment 7 and 8 and multiple other satellite hepatic lesions involving right and left hepatic lobes. Stable subcentimeter zayda hepatis lymph nodes. No other significant lymphadenopathy or free intraperitoneal fluid. She presents today for perioperative evaluation in anticipation of liver ablation CT on 24.    Past Medical History:   Diagnosis Date    Chronic headaches     Dizziness     Fibroids     Fibroids     Hypoglycemia     Liver mass     Neuroendocrine cancer (CMS/HCC)     Other bursal cyst, unspecified wrist     Synovial cyst of wrist    Ovarian cyst     Ovarian cyst     Streptococcus pharyngitis 2024       Past Surgical History:   Procedure Laterality Date    CERVICAL FUSION      CHOLECYSTECTOMY      COLONOSCOPY      CT GUIDED PERCUTANEOUS PERITONEAL OR RETROPERITONEAL FLUID COLLECTION DRAINAGE  2020    CT GUIDED PERCUTANEOUS PERITONEAL OR RETROPERITONEAL FLUID COLLECTION DRAINAGE [place] [facility] [place]    OTHER SURGICAL HISTORY  2022    Neck surgery    OTHER SURGICAL HISTORY  2020    Hepatectomy partial    OTHER SURGICAL HISTORY  2020    Liver tumor ablation    OTHER SURGICAL HISTORY      Liver biospy    SMALL BOWEL ENTEROSCOPY      TUBAL LIGATION      US GUIDED NEEDLE LIVER BIOPSY  2020    US GUIDED NEEDLE LIVER BIOPSY [place] [facility] [place] [facility]  [facility]    WRIST SURGERY         Patient Sexual activity questions deferred to the physician.    Family History   Problem Relation Name Age of Onset    Other (adult respiratory distress sydrome) Mother      Other (cardiac disorder) Mother      COPD Mother      Crohn's disease Mother      Hypothyroidism Mother      Hyperthyroidism Mother      Heart attack Mother      Cerebral aneurysm Father      Hyperthyroidism Other Grandmother     Hypothyroidism Other Grandmother     Diabetes Other Grandfather     Diabetes Other Aunt     Hyperthyroidism Other Aunt     Hypothyroidism Other Aunt     Diabetes Paternal Great-Grandmother         Allergies   Allergen Reactions    Methylprednisolone Other and Unknown     Abdominal pain    Other Unknown     steroids       Prior to Admission medications    Medication Sig Start Date End Date Taking? Authorizing Provider   furosemide (LASIX ORAL) Take by mouth if needed.    Historical Provider, MD   ibuprofen 200 mg tablet Take 1 tablet (200 mg) by mouth 3 times a day as needed. with food or milk    Historical Provider, MD   lanreotide (Somatuline Depot) 120 mg/0.5 mL syringe Inject under the skin.    Historical Provider, MD GUZMÁN ROS:   Constitutional:   neg    Neuro/Psych:    Chronic headaches   light-headedness  Eyes:   neg     use of corrective lenses  Ears:   neg    Nose:   neg    Mouth:   neg    Throat:   neg     throat pain (sore, recent URI treated with abx)  Neck:   neg     neck stiffness  Cardio:   neg     peripheral edema  Respiratory:   neg    Endocrine:   neg    GI:    constipation   diarrhea   nausea  :   neg    Musculoskeletal:   Hematologic:   neg    Skin:  neg        Physical Exam  Vitals reviewed.   Constitutional:       Appearance: Normal appearance.   HENT:      Head: Normocephalic and atraumatic.      Nose: Congestion present.      Mouth/Throat:      Mouth: Mucous membranes are moist.      Pharynx: Oropharynx is clear.   Eyes:      Extraocular Movements:  Extraocular movements intact.      Conjunctiva/sclera: Conjunctivae normal.      Pupils: Pupils are equal, round, and reactive to light.   Cardiovascular:      Rate and Rhythm: Normal rate and regular rhythm.      Pulses: Normal pulses.      Heart sounds: Normal heart sounds.   Pulmonary:      Effort: Pulmonary effort is normal.      Breath sounds: Normal breath sounds.   Musculoskeletal:         General: Normal range of motion.      Cervical back: Normal range of motion.   Skin:     General: Skin is warm and dry.   Neurological:      General: No focal deficit present.      Mental Status: She is alert and oriented to person, place, and time.   Psychiatric:         Mood and Affect: Mood normal.         Behavior: Behavior normal.          PAT AIRWAY:   Airway:     Mallampati::  III    TM distance::  >3 FB    Neck ROM::  Full  normal        Visit Vitals  /80   Pulse 72   Temp 36.7 °C (98 °F) (Oral)       DASI Risk Score      Flowsheet Row Most Recent Value   DASI SCORE 58.2   METS Score (Will be calculated only when all the questions are answered) 9.9          Caprini DVT Assessment      Flowsheet Row Most Recent Value   DVT Score 10   Current Status Present cancer or chemotherapy, Major surgery planned, lasting 2-3 hours   History Prior major surgery   Age 40-59 years   BMI 31-40 (Obesity)          Modified Frailty Index      Flowsheet Row Most Recent Value   Modified Frailty Index Calculator 0          CHADS2 Stroke Risk  Current as of today        N/A 3 - 100%: High Risk   2 - 3%: Medium Risk   0 - 2%: Low Risk     Last Change: N/A          This score determines the patient's risk of having a stroke if the patient has atrial fibrillation.        This score is not applicable to this patient. Components are not calculated.          Revised Cardiac Risk Index      Flowsheet Row Most Recent Value   Revised Cardiac Risk Calculator 0          Apfel Simplified Score      Flowsheet Row Most Recent Value   Apfel  Simplified Score Calculator 3          Risk Analysis Index Results This Encounter    No data found in the last 1 encounters.       Stop Bang Score      Flowsheet Row Most Recent Value   Do you snore loudly? 0   Do you often feel tired or fatigued after your sleep? 1   Has anyone ever observed you stop breathing in your sleep? 0   Do you have or are you being treated for high blood pressure? 0   Recent BMI (Calculated) 35.2   Is BMI greater than 35 kg/m2? 1=Yes   Age older than 50 years old? 0=No   Is your neck circumference greater than 17 inches (Male) or 16 inches (Female)? 0   Gender - Male 0=No   STOP-BANG Total Score 2          Recent Results (from the past 168 hour(s))   CBC    Collection Time: 04/05/24 10:56 AM   Result Value Ref Range    WBC 4.9 4.4 - 11.3 x10*3/uL    nRBC 0.0 0.0 - 0.0 /100 WBCs    RBC 5.20 4.00 - 5.20 x10*6/uL    Hemoglobin 15.1 12.0 - 16.0 g/dL    Hematocrit 46.5 (H) 36.0 - 46.0 %    MCV 89 80 - 100 fL    MCH 29.0 26.0 - 34.0 pg    MCHC 32.5 32.0 - 36.0 g/dL    RDW 13.3 11.5 - 14.5 %    Platelets 208 150 - 450 x10*3/uL   Comprehensive Metabolic Panel    Collection Time: 04/05/24 10:56 AM   Result Value Ref Range    Glucose 82 74 - 99 mg/dL    Sodium 138 136 - 145 mmol/L    Potassium 4.4 3.5 - 5.3 mmol/L    Chloride 105 98 - 107 mmol/L    Bicarbonate 21 21 - 32 mmol/L    Anion Gap 16 10 - 20 mmol/L    Urea Nitrogen 9 6 - 23 mg/dL    Creatinine 0.59 0.50 - 1.05 mg/dL    eGFR >90 >60 mL/min/1.73m*2    Calcium 9.0 8.6 - 10.6 mg/dL    Albumin 4.1 3.4 - 5.0 g/dL    Alkaline Phosphatase 78 33 - 110 U/L    Total Protein 6.8 6.4 - 8.2 g/dL    AST 22 9 - 39 U/L    Bilirubin, Total 0.4 0.0 - 1.2 mg/dL    ALT 20 7 - 45 U/L   Coagulation Screen    Collection Time: 04/05/24 10:56 AM   Result Value Ref Range    Protime 10.7 9.8 - 12.8 seconds    INR 1.0 0.9 - 1.1    aPTT 29 27 - 38 seconds        Diagnostic Results    EKG 6/30/2020  Show images for Electrocardiogram 12 Lead     Echo(07/21/23);  CONCLUSIONS:  1. Left ventricular systolic function is normal with a 60-65% estimated ejection fraction.  2. RVSP within normal limits.  3. Strain values are normal, which imply normal myocardial function.    Assessment and Plan:     Anesthesia:  The patient denies problems with anesthesia in the past such as PONV, prolonged sedation, awareness, dental damage, aspiration, cardiac arrest, difficult intubation, or unexpected hospital admissions.     Neuro:   The patient has no neurological diagnoses or significant findings on chart review, clinical presentation, and evaluation. No grossly apparent neurological perioperative risk. The patient is at increased risk for perioperative stroke secondary to female gender, general anesthesia. Handouts for preoperative brain exercises given to patient.    HEENT/Airway  The patient has diagnoses, significant findings on chart review, clinical presentation or evaluation of obesity, short thick neck, prior neck surgery, fusion/fixation. No documented or reported history of airway difficulty.  History of recurrent sinus infections, last diagnosed with strep 3/19. Abx completed. Patient with persistent symptoms. She is afebrile. Plan to follow up with PCP prior to ablation.    Cardiovascular  The patient is scheduled for non-cardiac surgery associated with elevated risk.  The patient has no major cardiac contraindications to non- cardiac surgery.  RCRI  The patient meets 0-1 RCRI criteria and therefore has a less than 1% risk of major adverse cardiac complications.  METS  The patient's functional capacity capacity is greater than 4 METS.  EKG  The patient has no EKG or echocardiographic changes concerning for myocardial ischemia.   Heart Failure  The patient has no known history of heart failure.  Additionally, the patient reports no symptoms of heart failure and demonstrates no signs of heart failure.  Hypertension Evaluation  The patient has no known history of hypertension and  has a normal blood pressure today.  Heart Rhythm Evaluation  The patient has no history of arrhythmias.  Heart Valve Evaluation  The patient has no known history of valvular heart disease. The patient has no symptoms or physical exam findings to suggest valvular heart disease.  Cardiology Evaluation  The patient is not followed by cardiology.    The patient has a 30-day risk for MACE of 0 predictors, 3.9% risk for cardiac death, nonfatal myocardial infarction, and nonfatal cardiac arrest.  DARIEN score which indicates a 0.2% risk of intraoperative or 30-day postoperative.    Pulmonary   No significant findings on chart review or clinical presentation and evaluation. The patient is at increased risk of perioperative pulmonary complications secondary to morbid obesity, recent respiratory infection (<1 month).    The patient has a stop bang score of 2, which places patient at low risk for having ART.    ARISCAT 16, low, 1.6% risk of in-hospital postoperative pulmonary complications  PRODIGY 3, low risk of respiratory depression episode. Patient given PI sheet for preoperative deep breathing exercises.    Hematology  The patient has diagnoses or significant findings on chart review or clinical presentation and evaluation significant for history of well differentiated neuroendocrine tumor, unknown primary. She is s/p partial hepatectomy, liver ablation, cholecystectomy. Recent imaging with enhancing hepatic lesions scheduled for liver ablation 4/22/24 .  Antiplatelet management   The patient is not currently receiving antiplatelet therapy.  Anticoagulation management  The patient is not currently receiving anticoagulation therapy. Patient provided with DVT educational handout.      Caprini score 10, high risk of perioperative VTE.     Patient instructed to ambulate as soon as possible postoperatively to decrease thromboembolic risk. Initiate mechanical DVT prophylaxis as soon as possible and initiate chemical prophylaxis  when deemed safe from a bleeding standpoint post surgery.     Transfusion Evaluation  A type and screen was obtained given the likelihood for perioperative transfusion of blood or blood products.    Gastrointestinal  The patient has diagnoses or significant findings on chart review or clinical presentation and evaluation significant for intermittent nausea controlled with zofran, ongoing chronic constipation, diarrhea.  Eat 10- 0,  self-perceived oropharyngeal dysphagia scale (0-40)     Genitourinary  No diagnoses or significant findings on chart review or clinical presentation and evaluation.    Renal  The patient has no known history of chronic kidney disease. No renal diagnoses or significant findings on chart review or clinical presentation and evaluation.    Musculoskeletal  No diagnoses or significant findings on chart review or clinical presentation and evaluation.    Endocrine  Diabetes Evaluation  The patient has no history of diabetes mellitus.  Thyroid Disease Evaluation  The patient has no history of thyroid disease.    ID  No diagnoses or significant findings on chart review or clinical presentation and evaluation.    -Preoperative medication instructions were provided and reviewed with the patient.  Any additional testing or evaluation was explained to the patient.  NPO Instructions were discussed, and the patient's questions were answered prior to conclusion of this encounter.

## 2024-04-19 ENCOUNTER — APPOINTMENT (OUTPATIENT)
Dept: HEMATOLOGY/ONCOLOGY | Facility: HOSPITAL | Age: 44
End: 2024-04-19
Payer: COMMERCIAL

## 2024-04-19 ENCOUNTER — APPOINTMENT (OUTPATIENT)
Dept: RADIOLOGY | Facility: HOSPITAL | Age: 44
End: 2024-04-19
Payer: COMMERCIAL

## 2024-04-22 ENCOUNTER — HOSPITAL ENCOUNTER (OUTPATIENT)
Dept: RADIOLOGY | Facility: HOSPITAL | Age: 44
Discharge: HOME | End: 2024-04-22
Payer: COMMERCIAL

## 2024-04-22 ENCOUNTER — ANESTHESIA (OUTPATIENT)
Dept: RADIOLOGY | Facility: HOSPITAL | Age: 44
End: 2024-04-22
Payer: COMMERCIAL

## 2024-04-22 ENCOUNTER — ANESTHESIA EVENT (OUTPATIENT)
Dept: RADIOLOGY | Facility: HOSPITAL | Age: 44
End: 2024-04-22
Payer: COMMERCIAL

## 2024-04-22 ENCOUNTER — PREP FOR PROCEDURE (OUTPATIENT)
Dept: RADIOLOGY | Facility: HOSPITAL | Age: 44
End: 2024-04-22

## 2024-04-22 VITALS
RESPIRATION RATE: 14 BRPM | TEMPERATURE: 97.2 F | DIASTOLIC BLOOD PRESSURE: 66 MMHG | WEIGHT: 220 LBS | SYSTOLIC BLOOD PRESSURE: 119 MMHG | HEART RATE: 87 BPM | OXYGEN SATURATION: 95 % | HEIGHT: 66 IN | BODY MASS INDEX: 35.36 KG/M2

## 2024-04-22 DIAGNOSIS — C7A.8 NEUROENDOCRINE CANCER (MULTI): ICD-10-CM

## 2024-04-22 DIAGNOSIS — R16.0 LIVER MASS: ICD-10-CM

## 2024-04-22 DIAGNOSIS — C7B.8 METASTATIC MALIGNANT NEUROENDOCRINE TUMOR TO LIVER (MULTI): ICD-10-CM

## 2024-04-22 DIAGNOSIS — R16.0 LIVER MASS: Primary | ICD-10-CM

## 2024-04-22 PROCEDURE — 2500000004 HC RX 250 GENERAL PHARMACY W/ HCPCS (ALT 636 FOR OP/ED)

## 2024-04-22 PROCEDURE — 3700000002 HC GENERAL ANESTHESIA TIME - EACH INCREMENTAL 1 MINUTE

## 2024-04-22 PROCEDURE — 77013 CT GUIDE FOR TISSUE ABLATION: CPT

## 2024-04-22 PROCEDURE — 88307 TISSUE EXAM BY PATHOLOGIST: CPT | Mod: TC,SUR | Performed by: RADIOLOGY

## 2024-04-22 PROCEDURE — A47382 PR ABLAT,OPEN,1+ LIVER TUMOR(S),PERCUT RF: Performed by: ANESTHESIOLOGY

## 2024-04-22 PROCEDURE — 88342 IMHCHEM/IMCYTCHM 1ST ANTB: CPT | Performed by: PATHOLOGY

## 2024-04-22 PROCEDURE — 2500000004 HC RX 250 GENERAL PHARMACY W/ HCPCS (ALT 636 FOR OP/ED): Mod: JZ,JG | Performed by: RADIOLOGY

## 2024-04-22 PROCEDURE — 77013 CT GUIDE FOR TISSUE ABLATION: CPT | Performed by: RADIOLOGY

## 2024-04-22 PROCEDURE — 2500000001 HC RX 250 WO HCPCS SELF ADMINISTERED DRUGS (ALT 637 FOR MEDICARE OP): Performed by: ANESTHESIOLOGY

## 2024-04-22 PROCEDURE — 88307 TISSUE EXAM BY PATHOLOGIST: CPT | Performed by: PATHOLOGY

## 2024-04-22 PROCEDURE — 2500000005 HC RX 250 GENERAL PHARMACY W/O HCPCS: Performed by: ANESTHESIOLOGY

## 2024-04-22 PROCEDURE — 88341 IMHCHEM/IMCYTCHM EA ADD ANTB: CPT | Performed by: PATHOLOGY

## 2024-04-22 PROCEDURE — 2500000004 HC RX 250 GENERAL PHARMACY W/ HCPCS (ALT 636 FOR OP/ED): Performed by: ANESTHESIOLOGY

## 2024-04-22 PROCEDURE — 2500000005 HC RX 250 GENERAL PHARMACY W/O HCPCS

## 2024-04-22 PROCEDURE — 47000 NEEDLE BIOPSY OF LIVER PERQ: CPT | Performed by: RADIOLOGY

## 2024-04-22 PROCEDURE — 2500000001 HC RX 250 WO HCPCS SELF ADMINISTERED DRUGS (ALT 637 FOR MEDICARE OP)

## 2024-04-22 PROCEDURE — A47382 PR ABLAT,OPEN,1+ LIVER TUMOR(S),PERCUT RF

## 2024-04-22 PROCEDURE — 47382 PERCUT ABLATE LIVER RF: CPT | Performed by: RADIOLOGY

## 2024-04-22 PROCEDURE — 3700000001 HC GENERAL ANESTHESIA TIME - INITIAL BASE CHARGE

## 2024-04-22 PROCEDURE — 2720000007 HC OR 272 NO HCPCS

## 2024-04-22 PROCEDURE — 77012 CT SCAN FOR NEEDLE BIOPSY: CPT | Mod: 59

## 2024-04-22 PROCEDURE — 76999 ECHO EXAMINATION PROCEDURE: CPT

## 2024-04-22 RX ORDER — OXYCODONE HYDROCHLORIDE 10 MG/1
10 TABLET ORAL EVERY 4 HOURS PRN
Status: DISCONTINUED | OUTPATIENT
Start: 2024-04-22 | End: 2024-04-22 | Stop reason: HOSPADM

## 2024-04-22 RX ORDER — CEFAZOLIN SODIUM 2 G/100ML
2 INJECTION, SOLUTION INTRAVENOUS ONCE
Status: COMPLETED | OUTPATIENT
Start: 2024-04-22 | End: 2024-04-22

## 2024-04-22 RX ORDER — OXYCODONE AND ACETAMINOPHEN 5; 325 MG/1; MG/1
1 TABLET ORAL EVERY 4 HOURS PRN
Qty: 20 TABLET | Refills: 0 | Status: SHIPPED | OUTPATIENT
Start: 2024-04-22 | End: 2024-04-29

## 2024-04-22 RX ORDER — LIDOCAINE HYDROCHLORIDE 20 MG/ML
INJECTION, SOLUTION INFILTRATION; PERINEURAL AS NEEDED
Status: DISCONTINUED | OUTPATIENT
Start: 2024-04-22 | End: 2024-04-22

## 2024-04-22 RX ORDER — SODIUM CHLORIDE, SODIUM LACTATE, POTASSIUM CHLORIDE, CALCIUM CHLORIDE 600; 310; 30; 20 MG/100ML; MG/100ML; MG/100ML; MG/100ML
100 INJECTION, SOLUTION INTRAVENOUS CONTINUOUS
Status: DISCONTINUED | OUTPATIENT
Start: 2024-04-22 | End: 2024-04-22 | Stop reason: HOSPADM

## 2024-04-22 RX ORDER — ACETAMINOPHEN 325 MG/1
650 TABLET ORAL EVERY 4 HOURS PRN
Status: DISCONTINUED | OUTPATIENT
Start: 2024-04-22 | End: 2024-04-22 | Stop reason: HOSPADM

## 2024-04-22 RX ORDER — FENTANYL CITRATE 50 UG/ML
INJECTION, SOLUTION INTRAMUSCULAR; INTRAVENOUS AS NEEDED
Status: DISCONTINUED | OUTPATIENT
Start: 2024-04-22 | End: 2024-04-22

## 2024-04-22 RX ORDER — OXYCODONE AND ACETAMINOPHEN 5; 325 MG/1; MG/1
1 TABLET ORAL EVERY 4 HOURS PRN
Status: DISCONTINUED | OUTPATIENT
Start: 2024-04-22 | End: 2024-04-22 | Stop reason: HOSPADM

## 2024-04-22 RX ORDER — HYDROMORPHONE HYDROCHLORIDE 1 MG/ML
0.5 INJECTION, SOLUTION INTRAMUSCULAR; INTRAVENOUS; SUBCUTANEOUS EVERY 5 MIN PRN
Status: DISCONTINUED | OUTPATIENT
Start: 2024-04-22 | End: 2024-04-22 | Stop reason: HOSPADM

## 2024-04-22 RX ORDER — DROPERIDOL 2.5 MG/ML
0.62 INJECTION, SOLUTION INTRAMUSCULAR; INTRAVENOUS ONCE AS NEEDED
Status: DISCONTINUED | OUTPATIENT
Start: 2024-04-22 | End: 2024-04-22 | Stop reason: HOSPADM

## 2024-04-22 RX ORDER — LIDOCAINE HYDROCHLORIDE 40 MG/ML
SOLUTION TOPICAL AS NEEDED
Status: DISCONTINUED | OUTPATIENT
Start: 2024-04-22 | End: 2024-04-22

## 2024-04-22 RX ORDER — ROCURONIUM BROMIDE 10 MG/ML
INJECTION, SOLUTION INTRAVENOUS AS NEEDED
Status: DISCONTINUED | OUTPATIENT
Start: 2024-04-22 | End: 2024-04-22

## 2024-04-22 RX ORDER — ONDANSETRON HYDROCHLORIDE 2 MG/ML
INJECTION, SOLUTION INTRAVENOUS AS NEEDED
Status: DISCONTINUED | OUTPATIENT
Start: 2024-04-22 | End: 2024-04-22

## 2024-04-22 RX ORDER — MIDAZOLAM HYDROCHLORIDE 1 MG/ML
INJECTION, SOLUTION INTRAMUSCULAR; INTRAVENOUS AS NEEDED
Status: DISCONTINUED | OUTPATIENT
Start: 2024-04-22 | End: 2024-04-22

## 2024-04-22 RX ORDER — KETOROLAC TROMETHAMINE 30 MG/ML
INJECTION, SOLUTION INTRAMUSCULAR; INTRAVENOUS AS NEEDED
Status: DISCONTINUED | OUTPATIENT
Start: 2024-04-22 | End: 2024-04-22

## 2024-04-22 RX ORDER — OXYCODONE AND ACETAMINOPHEN 5; 325 MG/1; MG/1
1 TABLET ORAL EVERY 4 HOURS PRN
Qty: 20 TABLET | Refills: 0 | Status: SHIPPED | OUTPATIENT
Start: 2024-04-22 | End: 2024-04-22

## 2024-04-22 RX ORDER — LIDOCAINE IN NACL,ISO-OSMOT/PF 30 MG/3 ML
0.1 SYRINGE (ML) INJECTION ONCE
Status: DISCONTINUED | OUTPATIENT
Start: 2024-04-22 | End: 2024-04-22 | Stop reason: HOSPADM

## 2024-04-22 RX ORDER — PROPOFOL 10 MG/ML
INJECTION, EMULSION INTRAVENOUS AS NEEDED
Status: DISCONTINUED | OUTPATIENT
Start: 2024-04-22 | End: 2024-04-22

## 2024-04-22 RX ORDER — METOCLOPRAMIDE HYDROCHLORIDE 5 MG/ML
10 INJECTION INTRAMUSCULAR; INTRAVENOUS ONCE AS NEEDED
Status: DISCONTINUED | OUTPATIENT
Start: 2024-04-22 | End: 2024-04-22 | Stop reason: HOSPADM

## 2024-04-22 RX ORDER — LABETALOL HYDROCHLORIDE 5 MG/ML
5 INJECTION, SOLUTION INTRAVENOUS ONCE AS NEEDED
Status: DISCONTINUED | OUTPATIENT
Start: 2024-04-22 | End: 2024-04-22 | Stop reason: HOSPADM

## 2024-04-22 RX ORDER — SODIUM CHLORIDE, SODIUM LACTATE, POTASSIUM CHLORIDE, CALCIUM CHLORIDE 600; 310; 30; 20 MG/100ML; MG/100ML; MG/100ML; MG/100ML
INJECTION, SOLUTION INTRAVENOUS CONTINUOUS PRN
Status: DISCONTINUED | OUTPATIENT
Start: 2024-04-22 | End: 2024-04-22

## 2024-04-22 RX ORDER — GLYCOPYRROLATE 0.2 MG/ML
INJECTION INTRAMUSCULAR; INTRAVENOUS AS NEEDED
Status: DISCONTINUED | OUTPATIENT
Start: 2024-04-22 | End: 2024-04-22

## 2024-04-22 RX ADMIN — HYDROMORPHONE HYDROCHLORIDE 0.5 MG: 1 INJECTION, SOLUTION INTRAMUSCULAR; INTRAVENOUS; SUBCUTANEOUS at 10:39

## 2024-04-22 RX ADMIN — GLYCOPYRROLATE 0.2 MG: 0.2 INJECTION INTRAMUSCULAR; INTRAVENOUS at 08:52

## 2024-04-22 RX ADMIN — MIDAZOLAM 2 MG: 1 INJECTION INTRAMUSCULAR; INTRAVENOUS at 08:10

## 2024-04-22 RX ADMIN — PROPOFOL 140 MG: 10 INJECTION, EMULSION INTRAVENOUS at 08:19

## 2024-04-22 RX ADMIN — FENTANYL CITRATE 100 MCG: 50 INJECTION, SOLUTION INTRAMUSCULAR; INTRAVENOUS at 08:16

## 2024-04-22 RX ADMIN — ROCURONIUM BROMIDE 80 MG: 10 INJECTION INTRAVENOUS at 08:19

## 2024-04-22 RX ADMIN — HYDROMORPHONE HYDROCHLORIDE 0.5 MG: 1 INJECTION, SOLUTION INTRAMUSCULAR; INTRAVENOUS; SUBCUTANEOUS at 12:36

## 2024-04-22 RX ADMIN — SODIUM CHLORIDE, POTASSIUM CHLORIDE, SODIUM LACTATE AND CALCIUM CHLORIDE: 600; 310; 30; 20 INJECTION, SOLUTION INTRAVENOUS at 08:30

## 2024-04-22 RX ADMIN — HYDROMORPHONE HYDROCHLORIDE 0.5 MG: 1 INJECTION, SOLUTION INTRAMUSCULAR; INTRAVENOUS; SUBCUTANEOUS at 11:02

## 2024-04-22 RX ADMIN — SUGAMMADEX 200 MG: 100 INJECTION, SOLUTION INTRAVENOUS at 10:16

## 2024-04-22 RX ADMIN — CEFAZOLIN SODIUM 2 G: 2 INJECTION, SOLUTION INTRAVENOUS at 08:26

## 2024-04-22 RX ADMIN — ONDANSETRON 4 MG: 2 INJECTION INTRAMUSCULAR; INTRAVENOUS at 09:50

## 2024-04-22 RX ADMIN — KETOROLAC TROMETHAMINE 30 MG: 30 INJECTION, SOLUTION INTRAMUSCULAR; INTRAVENOUS at 09:50

## 2024-04-22 RX ADMIN — OXYCODONE HYDROCHLORIDE AND ACETAMINOPHEN 1 TABLET: 5; 325 TABLET ORAL at 11:34

## 2024-04-22 RX ADMIN — SODIUM CHLORIDE: 9 INJECTION, SOLUTION INTRAVENOUS at 08:05

## 2024-04-22 RX ADMIN — Medication 6 L/MIN: at 10:28

## 2024-04-22 RX ADMIN — DEXAMETHASONE SODIUM PHOSPHATE 4 MG: 4 INJECTION, SOLUTION INTRAMUSCULAR; INTRAVENOUS at 08:26

## 2024-04-22 RX ADMIN — ROCURONIUM BROMIDE 10 MG: 10 INJECTION INTRAVENOUS at 09:29

## 2024-04-22 RX ADMIN — HYDROMORPHONE HYDROCHLORIDE 0.5 MG: 1 INJECTION, SOLUTION INTRAMUSCULAR; INTRAVENOUS; SUBCUTANEOUS at 10:44

## 2024-04-22 RX ADMIN — LIDOCAINE HYDROCHLORIDE 100 MG: 20 INJECTION, SOLUTION INFILTRATION; PERINEURAL at 08:19

## 2024-04-22 RX ADMIN — LIDOCAINE HYDROCHLORIDE 4 ML: 40 SOLUTION TOPICAL at 08:23

## 2024-04-22 ASSESSMENT — PAIN SCALES - GENERAL
PAINLEVEL_OUTOF10: 5 - MODERATE PAIN
PAINLEVEL_OUTOF10: 3
PAINLEVEL_OUTOF10: 4
PAINLEVEL_OUTOF10: 10 - WORST POSSIBLE PAIN
PAINLEVEL_OUTOF10: 10 - WORST POSSIBLE PAIN
PAINLEVEL_OUTOF10: 5 - MODERATE PAIN
PAINLEVEL_OUTOF10: 3
PAINLEVEL_OUTOF10: 3
PAINLEVEL_OUTOF10: 7
PAINLEVEL_OUTOF10: 7
PAINLEVEL_OUTOF10: 0 - NO PAIN
PAIN_LEVEL: 2
PAINLEVEL_OUTOF10: 7
PAINLEVEL_OUTOF10: 4
PAINLEVEL_OUTOF10: 3

## 2024-04-22 NOTE — POST-PROCEDURE NOTE
Interventional Radiology Brief Postprocedure Note    Attending: Dr. Carmella Wiley    Assistant: Dr. Tommy Regan    Diagnosis: Liver Metastasis    Description of procedure: Fused ultrasound/CT guided ablation of four separate liver lesions. Additional biopsy of hepatic segment 5 lesion (not segment 8 as previously stated). Further details in PACS.     Anesthesia:  General    Complications: None    Estimated Blood Loss: minimal    Medications (Filter: Administrations occurring from 0810 to 1023 on 04/22/24) As of 04/22/24 1023      None          Two 16G core needle biopsy samples were obtained and sent for analysis.       See detailed result report with images in PACS.    The patient tolerated the procedure well without incident or complication and is in stable condition.

## 2024-04-22 NOTE — POST-PROCEDURE NOTE
Interventional Radiology Brief Postprocedure Note    Attending: Dr. Carmella Wiley    Assistant: Dr. Tommy Regan     Diagnosis: Liver metastasis     Description of procedure: Fused ultrasound/CT guided ablation of four separate liver lesions. Additional biopsy of the segment 8 lesion. Further details in PACS.      Anesthesia:  General    Complications: None    Estimated Blood Loss: minimal    Medications (Filter: Administrations occurring from 0810 to 1017 on 04/22/24) As of 04/22/24 1017      None          Two 16G core needle biopsy samples were obtained and sent for analysis.       See detailed result report with images in PACS.    The patient tolerated the procedure well without incident or complication and is in stable condition.

## 2024-04-22 NOTE — ANESTHESIA PROCEDURE NOTES
Airway  Date/Time: 4/22/2024 8:23 AM  Urgency: elective    Airway not difficult    Staffing  Performed: HARMEET   Authorized by: Jose Marie MD    Performed by: LATRICE Sutton  Patient location during procedure: OR    Indications and Patient Condition  Indications for airway management: anesthesia and airway protection  Spontaneous Ventilation: absent  Sedation level: deep  Preoxygenated: yes  Patient position: sniffing  Mask difficulty assessment: 1 - vent by mask  Planned trial extubation    Final Airway Details  Final airway type: endotracheal airway      Successful airway: ETT  Cuffed: yes   Successful intubation technique: direct laryngoscopy  Facilitating devices/methods: intubating stylet  Endotracheal tube insertion site: oral  Blade: Nova  Blade size: #3  ETT size (mm): 7.0  Cormack-Lehane Classification: grade IIb - view of arytenoids or posterior of glottis only  Placement verified by: chest auscultation   Measured from: teeth  ETT to teeth (cm): 21  Number of attempts at approach: 1    Additional Comments  Lips/teeth in preanesthetic condition. LTA kit used.

## 2024-04-22 NOTE — PRE-PROCEDURE NOTE
Interventional Radiology Preprocedure Note    Indication for procedure: Diagnoses of 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 (Multi) and Liver mass were pertinent to this visit.    Relevant review of systems: NA    Relevant Labs:   Lab Results   Component Value Date    CREATININE 0.59 04/05/2024    EGFR >90 04/05/2024    INR 1.0 04/05/2024    PROTIME 10.7 04/05/2024       Planned Sedation/Anesthesia: Deep    Airway assessment: normal    Directed physical examination:    Physical Exam  Constitutional:       Appearance: Normal appearance.   Cardiovascular:      Rate and Rhythm: Normal rate and regular rhythm.   Pulmonary:      Effort: Pulmonary effort is normal.   Neurological:      Mental Status: She is alert.          Mallampati:  Patient will be intubated and sedated by anesthesia team.     ASA Score: ASA 3 - Patient with moderate systemic disease with functional limitations    Benefits, risks and alternatives of procedure and planned sedation have been discussed with the patient and/or their representative. All questions answered and they agree to proceed.

## 2024-04-22 NOTE — PERIOPERATIVE NURSING NOTE
Reviewed Homegoing Instructions with the patient who stated she understood them.  at the bedside.  Patient ambulated to the bathroom and voided.

## 2024-04-22 NOTE — ANESTHESIA POSTPROCEDURE EVALUATION
Patient: Brooke Chu    Procedure Summary       Date: 04/22/24 Room / Location: [facility]    Anesthesia Start: 0810 Anesthesia Stop:     Procedures:       CT GUIDED RF ABLATION LIVER      US FUSION      CT GUIDED PERCUTANEOUS BIOPSY LIVER Diagnosis:       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 (Multi)      Liver mass      (liver lesions, NET)      (liver lesions, NET)      (liver lesions, NET)    Scheduled Providers: Jose Marie MD Responsible Provider: Jose Marie MD    Anesthesia Type: general ASA Status: 3            Anesthesia Type: general    Vitals Value Taken Time   /84 04/22/24 1030   Temp 36.2 °C (97.2 °F) 04/22/24 1028   Pulse 84 04/22/24 1030   Resp 16 04/22/24 1030   SpO2 97 04/22/24 1042       Anesthesia Post Evaluation    Patient location during evaluation: PACU  Patient participation: complete - patient participated  Level of consciousness: awake and awake and alert  Pain score: 2  Pain management: adequate  Airway patency: patent  Cardiovascular status: acceptable  Respiratory status: acceptable  Hydration status: acceptable  Postoperative Nausea and Vomiting: none        No notable events documented.

## 2024-04-22 NOTE — ANESTHESIA PREPROCEDURE EVALUATION
Patient: Brooke Chu    Procedure Information       Date/Time: 04/22/24 0800    Scheduled providers: Jose Marie MD    Procedure: CT GUIDED RF ABLATION LIVER    Location: [facility]            Relevant Problems   Neuro   (+) Radiculopathy      Liver   (+) 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 (Multi)      Endocrine   (+) Endocrine cancer (Multi)      Hematology   (+) Postoperative anemia due to acute blood loss      HEENT   (+) Acute non-recurrent pansinusitis      Skin   (+) SCC (squamous cell carcinoma)      GYN   (+) Fibroid, uterine       Clinical information reviewed:   Tobacco  Allergies  Meds  Problems  Med Hx  Surg Hx   Fam Hx  Soc   Hx        NPO Detail:  NPO/Void Status  Date of Last Liquid: 04/22/24  Time of Last Liquid: 0000  Date of Last Solid: 04/22/24  Time of Last Solid: 0000         Physical Exam    Airway  Mallampati: III  TM distance: <3 FB  Neck ROM: limited     Cardiovascular - normal exam     Dental - normal exam     Pulmonary - normal exam     Abdominal            Anesthesia Plan    History of general anesthesia?: yes  History of complications of general anesthesia?: no    ASA 3     general     intravenous induction   Anesthetic plan and risks discussed with patient.    Plan discussed with CAA and attending.

## 2024-04-26 ENCOUNTER — TELEPHONE VISIT (OUTPATIENT)
Dept: RADIOLOGY | Facility: HOSPITAL | Age: 44
End: 2024-04-26
Payer: COMMERCIAL

## 2024-04-26 ENCOUNTER — HOSPITAL ENCOUNTER (EMERGENCY)
Facility: HOSPITAL | Age: 44
Discharge: HOME | End: 2024-04-26
Payer: COMMERCIAL

## 2024-04-26 ENCOUNTER — APPOINTMENT (OUTPATIENT)
Dept: CARDIOLOGY | Facility: HOSPITAL | Age: 44
End: 2024-04-26
Payer: COMMERCIAL

## 2024-04-26 VITALS
HEIGHT: 66 IN | OXYGEN SATURATION: 99 % | TEMPERATURE: 98.1 F | SYSTOLIC BLOOD PRESSURE: 134 MMHG | RESPIRATION RATE: 16 BRPM | HEART RATE: 62 BPM | DIASTOLIC BLOOD PRESSURE: 111 MMHG | BODY MASS INDEX: 35.36 KG/M2 | WEIGHT: 220 LBS

## 2024-04-26 DIAGNOSIS — R60.0 LOCALIZED EDEMA: ICD-10-CM

## 2024-04-26 DIAGNOSIS — R60.9 PERIPHERAL EDEMA: Primary | ICD-10-CM

## 2024-04-26 LAB
ALBUMIN SERPL BCP-MCNC: 3.7 G/DL (ref 3.4–5)
ALP SERPL-CCNC: 88 U/L (ref 33–110)
ALT SERPL W P-5'-P-CCNC: 77 U/L (ref 7–45)
ANION GAP SERPL CALC-SCNC: 10 MMOL/L (ref 10–20)
AORTIC VALVE PEAK VELOCITY: 1.37 M/S
APPEARANCE UR: CLEAR
AST SERPL W P-5'-P-CCNC: 40 U/L (ref 9–39)
AV PEAK GRADIENT: 7.5 MMHG
AVA (PEAK VEL): 1.67 CM2
BASOPHILS # BLD AUTO: 0.04 X10*3/UL (ref 0–0.1)
BASOPHILS NFR BLD AUTO: 0.5 %
BILIRUB DIRECT SERPL-MCNC: 0.1 MG/DL (ref 0–0.3)
BILIRUB SERPL-MCNC: 0.4 MG/DL (ref 0–1.2)
BILIRUB UR STRIP.AUTO-MCNC: NEGATIVE MG/DL
BNP SERPL-MCNC: 116 PG/ML (ref 0–99)
BUN SERPL-MCNC: 12 MG/DL (ref 6–23)
CALCIUM SERPL-MCNC: 8.8 MG/DL (ref 8.6–10.3)
CARDIAC TROPONIN I PNL SERPL HS: 3 NG/L (ref 0–13)
CHLORIDE SERPL-SCNC: 106 MMOL/L (ref 98–107)
CO2 SERPL-SCNC: 26 MMOL/L (ref 21–32)
COLOR UR: ABNORMAL
CREAT SERPL-MCNC: 0.71 MG/DL (ref 0.5–1.05)
EGFRCR SERPLBLD CKD-EPI 2021: >90 ML/MIN/1.73M*2
EJECTION FRACTION APICAL 4 CHAMBER: 60.3
EOSINOPHIL # BLD AUTO: 0.24 X10*3/UL (ref 0–0.7)
EOSINOPHIL NFR BLD AUTO: 3.2 %
ERYTHROCYTE [DISTWIDTH] IN BLOOD BY AUTOMATED COUNT: 13.2 % (ref 11.5–14.5)
GLUCOSE SERPL-MCNC: 98 MG/DL (ref 74–99)
GLUCOSE UR STRIP.AUTO-MCNC: NORMAL MG/DL
HCT VFR BLD AUTO: 39 % (ref 36–46)
HGB BLD-MCNC: 13.2 G/DL (ref 12–16)
HOLD SPECIMEN: NORMAL
IMM GRANULOCYTES # BLD AUTO: 0.03 X10*3/UL (ref 0–0.7)
IMM GRANULOCYTES NFR BLD AUTO: 0.4 % (ref 0–0.9)
INR PPP: 1 (ref 0.9–1.1)
KETONES UR STRIP.AUTO-MCNC: NEGATIVE MG/DL
LEFT ATRIUM VOLUME AREA LENGTH INDEX BSA: 25.1 ML/M2
LEFT VENTRICLE INTERNAL DIMENSION DIASTOLE: 4.8 CM (ref 3.5–6)
LEFT VENTRICULAR OUTFLOW TRACT DIAMETER: 1.7 CM
LEUKOCYTE ESTERASE UR QL STRIP.AUTO: NEGATIVE
LIPASE SERPL-CCNC: 11 U/L (ref 9–82)
LV EJECTION FRACTION BIPLANE: 60 %
LYMPHOCYTES # BLD AUTO: 2.42 X10*3/UL (ref 1.2–4.8)
LYMPHOCYTES NFR BLD AUTO: 32.7 %
MAGNESIUM SERPL-MCNC: 1.92 MG/DL (ref 1.6–2.4)
MCH RBC QN AUTO: 29.7 PG (ref 26–34)
MCHC RBC AUTO-ENTMCNC: 33.8 G/DL (ref 32–36)
MCV RBC AUTO: 88 FL (ref 80–100)
MITRAL VALVE E/A RATIO: 1.7
MITRAL VALVE E/E' RATIO: 6.77
MONOCYTES # BLD AUTO: 0.8 X10*3/UL (ref 0.1–1)
MONOCYTES NFR BLD AUTO: 10.8 %
MUCOUS THREADS #/AREA URNS AUTO: ABNORMAL /LPF
NEUTROPHILS # BLD AUTO: 3.88 X10*3/UL (ref 1.2–7.7)
NEUTROPHILS NFR BLD AUTO: 52.4 %
NITRITE UR QL STRIP.AUTO: NEGATIVE
NRBC BLD-RTO: 0 /100 WBCS (ref 0–0)
PH UR STRIP.AUTO: 6 [PH]
PLATELET # BLD AUTO: 191 X10*3/UL (ref 150–450)
POTASSIUM SERPL-SCNC: 4.2 MMOL/L (ref 3.5–5.3)
PROT SERPL-MCNC: 6.6 G/DL (ref 6.4–8.2)
PROT UR STRIP.AUTO-MCNC: NEGATIVE MG/DL
PROTHROMBIN TIME: 11 SECONDS (ref 9.8–12.8)
RBC # BLD AUTO: 4.44 X10*6/UL (ref 4–5.2)
RBC # UR STRIP.AUTO: ABNORMAL /UL
RBC #/AREA URNS AUTO: ABNORMAL /HPF
RIGHT VENTRICLE FREE WALL PEAK S': 14 CM/S
SODIUM SERPL-SCNC: 138 MMOL/L (ref 136–145)
SP GR UR STRIP.AUTO: 1.02
SQUAMOUS #/AREA URNS AUTO: ABNORMAL /HPF
TRICUSPID ANNULAR PLANE SYSTOLIC EXCURSION: 2.9 CM
UROBILINOGEN UR STRIP.AUTO-MCNC: NORMAL MG/DL
WBC # BLD AUTO: 7.4 X10*3/UL (ref 4.4–11.3)
WBC #/AREA URNS AUTO: ABNORMAL /HPF

## 2024-04-26 PROCEDURE — 85610 PROTHROMBIN TIME: CPT | Performed by: HEALTH CARE PROVIDER

## 2024-04-26 PROCEDURE — 82374 ASSAY BLOOD CARBON DIOXIDE: CPT | Performed by: HEALTH CARE PROVIDER

## 2024-04-26 PROCEDURE — 84484 ASSAY OF TROPONIN QUANT: CPT | Performed by: HEALTH CARE PROVIDER

## 2024-04-26 PROCEDURE — 85025 COMPLETE CBC W/AUTO DIFF WBC: CPT | Performed by: HEALTH CARE PROVIDER

## 2024-04-26 PROCEDURE — 82248 BILIRUBIN DIRECT: CPT | Performed by: HEALTH CARE PROVIDER

## 2024-04-26 PROCEDURE — 81001 URINALYSIS AUTO W/SCOPE: CPT | Performed by: HEALTH CARE PROVIDER

## 2024-04-26 PROCEDURE — 99284 EMERGENCY DEPT VISIT MOD MDM: CPT | Mod: 25

## 2024-04-26 PROCEDURE — 36415 COLL VENOUS BLD VENIPUNCTURE: CPT | Performed by: HEALTH CARE PROVIDER

## 2024-04-26 PROCEDURE — 83880 ASSAY OF NATRIURETIC PEPTIDE: CPT | Performed by: HEALTH CARE PROVIDER

## 2024-04-26 PROCEDURE — 83690 ASSAY OF LIPASE: CPT | Performed by: HEALTH CARE PROVIDER

## 2024-04-26 PROCEDURE — 93306 TTE W/DOPPLER COMPLETE: CPT

## 2024-04-26 PROCEDURE — 93306 TTE W/DOPPLER COMPLETE: CPT | Performed by: STUDENT IN AN ORGANIZED HEALTH CARE EDUCATION/TRAINING PROGRAM

## 2024-04-26 PROCEDURE — 83735 ASSAY OF MAGNESIUM: CPT | Performed by: HEALTH CARE PROVIDER

## 2024-04-26 PROCEDURE — 93005 ELECTROCARDIOGRAM TRACING: CPT

## 2024-04-26 ASSESSMENT — PAIN - FUNCTIONAL ASSESSMENT: PAIN_FUNCTIONAL_ASSESSMENT: 0-10

## 2024-04-26 ASSESSMENT — PAIN DESCRIPTION - PROGRESSION: CLINICAL_PROGRESSION: NOT CHANGED

## 2024-04-26 ASSESSMENT — PAIN SCALES - GENERAL: PAINLEVEL_OUTOF10: 0 - NO PAIN

## 2024-04-26 NOTE — ED PROVIDER NOTES
HPI   Chief Complaint   Patient presents with    Leg Swelling     Pt with swelling to arms and legs after liver ablasion Monday , told by oncologist to come to ED for an ECHO to assess heart function       CC: Peripheral edema  HPI:   This is a 44-year-old female who has a history of neuroendocrine carcinoma, recent liver ablation earlier this week with some increased lower extremity upper extremity edema pitting in the lower extremities she does have prescription for Lasix at home however she was instructed not to take it yet there was some concern for abnormal EF and her providers are requesting echo.  She denies any chest pain or shortness of breath no fever, chills    Additional Limitations to History:   External Records Reviewed: I reviewed recent and relevant outside records including   History Obtained From:     Past Medical History: Per HPI  Medications: Reviewed in EMR and with patient  Allergies:  Reviewed in EMR  Past Surgical History:   Social History:     ------------------------------------------------------------------------------------------------------  Physical Exam:  --Vital signs reviewed in nursing triage note, EMR flow sheets, and at patient's bedside  GEN:  A&Ox3, no acute distress, appears comfortable.  Conversational and appropriate.  No confusion or gross mental status changes.  EYES: EOMI, non-injected sclera.  ENT: Moist mucous membranes, no apparent injuries or lesions.   CARDIO: Normal rate and regular rhythm. No murmurs, rubs, or gallops.  2+ equal pulses of the distal extremities.  2+ pitting edema in the lower extremities below the knee.  PULM: Clear to auscultation bilaterally. No rales, rhonchi, or wheezes. Good symmetric chest expansion.  GI: Soft, non-tender, non-distended. No rebound tenderness or guarding.  SKIN: Warm and dry, no rashes or lesions.  MSK: ROM intact the extremities without contractures.   EXT: No peripheral edema, contusions, or wounds.   NEURO: Cranial nerves  II-XII grossly intact. Sensation to light touch intact and equal bilaterally in upper and lower extremities.  Symmetric 5/5 strength in upper and lower extremities.  PSYCH: Appropriate mood and behavior, converses and responds appropriately during exam.      Differential Diagnoses Considered:   Chronic Medical Conditions Significantly Affecting Care:   Diagnostic testing considered: [PERC, D-Dimer, PECARN, etc.]    - EKG interpreted by myself normal sinus rhythm ventricular rate 66 SC interval 142 normal QRS duration no prolonged QT/QTc no obvious ST elevation, depression, T wave version or acute ischemic findings.  - I independently interpreted: [CXR, CT, POCUS, etc. including your interpretation]  - Labs notable for     Escalation of Care: Appropriate for  Social Determinants of Health Significantly Affecting Care: [Homelessness, lacking transportation, uninsured, unable to afford medications]  Prescription Drug Consideration: [Antibiotics, antivirals, pain medications, etc.]  Discussion of Management with Other Providers:  I discussed the patient/results with: [admitting team, consultant, radiologist, social work, EPAT, case management, PT/OT, RT, PCP, etc.]      Boyd Phelps PA-C                          Randa Coma Scale Score: 15                     Patient History   Past Medical History:   Diagnosis Date    Chronic headaches     Dizziness     Fibroids     Fibroids     Hypoglycemia     Liver mass     Neuroendocrine cancer (Multi)     Other bursal cyst, unspecified wrist     Synovial cyst of wrist    Ovarian cyst     Ovarian cyst     Streptococcus pharyngitis 03/19/2024     Past Surgical History:   Procedure Laterality Date    CERVICAL FUSION      CHOLECYSTECTOMY      COLONOSCOPY      CT GUIDED PERCUTANEOUS PERITONEAL OR RETROPERITONEAL FLUID COLLECTION DRAINAGE  11/03/2020    CT GUIDED PERCUTANEOUS PERITONEAL OR RETROPERITONEAL FLUID COLLECTION [place]RAINAGE [place] [facility] [place]    OTHER SURGICAL  HISTORY  08/24/2022    Neck surgery    OTHER SURGICAL HISTORY  11/14/2020    Hepatectomy partial    OTHER SURGICAL HISTORY  11/14/2020    Liver tumor ablation    OTHER SURGICAL HISTORY      Liver biospy    SMALL BOWEL ENTEROSCOPY      TUBAL LIGATION      US GUIDED NEEDLE LIVER BIOPSY  07/23/2020    US GUIDED NEEDLE LIVER BIOPSY 7/23/2020 [facility] AIB LEGACY    WRIST SURGERY       Family History   Problem Relation Name Age of Onset    Other (adult respiratory distress sydrome) Mother      Other (cardiac disorder) Mother      COPD Mother      Crohn's disease Mother      Hypothyroidism Mother      Hyperthyroidism Mother      Heart attack Mother      Cerebral aneurysm Father      Hyperthyroidism Other Grandmother     Hypothyroidism Other Grandmother     Diabetes Other Grandfather     Diabetes Other Aunt     Hyperthyroidism Other Aunt     Hypothyroidism Other Aunt     Diabetes Paternal Great-Grandmother       Social History     Tobacco Use    Smoking status: Never     Passive exposure: Never    Smokeless tobacco: Never   Vaping Use    Vaping status: Never Used   Substance Use Topics    Alcohol use: Never    Drug use: Never       Physical Exam   ED Triage Vitals [04/26/24 1515]   Temperature Heart Rate Respirations BP   36.7 °C (98.1 °F) 73 16 (!) 137/91      Pulse Ox Temp Source Heart Rate Source Patient Position   98 % Temporal Monitor Lying      BP Location FiO2 (%)     Left arm --       Physical Exam    ED Course & MDM   Diagnoses as of 04/26/24 1817   Peripheral edema       Medical Decision Making  44-year-old female with neuroendocrine carcinoma recent ablation to the liver with acute onset of increased lower extremity edema, patient is nontoxic-appearing, afebrile, she is not oxygen dependent or hypoxic, O2 sats or stable, she was mildly hypertensive her laboratory workup appears unremarkable BNP is slightly elevated 116 normal troponins, transthoracic echo done with EF of 60 to 65% and does not appear to have any other  concerning findings at this time, was able to contact patient's oncologist and update him, patient feels comfortable being discharged home she was advised to take her Lasix at home and return to ED precautions given.        Procedure  Procedures     Boyd Phelps PA-C  05/01/24 0803

## 2024-04-26 NOTE — SIGNIFICANT EVENT
Patient called with concerns for new, generalized swelling.  She underwent ablation of NET liver lesions with Dr. Wiley on 4/22/24 and states that she noticed an increase in swelling from her baseline the next day, in her lower extremities and in her arms.  She states that it is pitting edema.  She previously would have an increase in swelling associated with each dose of lanreotide, states that her dose would have been due on Friday but the medication was discontinued by her oncologist.  States that she has also noted increased urinary output since noticing the new swelling, both in frequency and volume, states that she is trying to stay hydrated but her mouth does feel dry.  As far as symptoms post-embolization, patient states that she has mild abdominal discomfort and fatigue, but that otherwise the procedure was well tolerated.  This provider reached out to patient's oncologist, Dr. Sotero Arango, who recommended that the patient be referred to the emergency department for evaluation with an echocardiogram.  This provider called the patient and shared the recommendations, the patient stated her understanding and said that she would present to the [facility] ED.

## 2024-04-27 LAB — HOLD SPECIMEN: NORMAL

## 2024-04-29 LAB
ATRIAL RATE: 66 BPM
P AXIS: 51 DEGREES
P OFFSET: 204 MS
P ONSET: 152 MS
PR INTERVAL: 142 MS
Q ONSET: 223 MS
QRS COUNT: 11 BEATS
QRS DURATION: 80 MS
QT INTERVAL: 394 MS
QTC CALCULATION(BAZETT): 413 MS
QTC FREDERICIA: 407 MS
R AXIS: 58 DEGREES
T AXIS: 26 DEGREES
T OFFSET: 420 MS
VENTRICULAR RATE: 66 BPM

## 2024-04-30 LAB
LAB AP ASR DISCLAIMER: NORMAL
LABORATORY COMMENT REPORT: NORMAL
PATH REPORT.COMMENTS IMP SPEC: NORMAL
PATH REPORT.FINAL DX SPEC: NORMAL
PATH REPORT.GROSS SPEC: NORMAL
PATH REPORT.RELEVANT HX SPEC: NORMAL
PATH REPORT.TOTAL CANCER: NORMAL

## 2024-05-14 ENCOUNTER — TELEMEDICINE (OUTPATIENT)
Dept: PRIMARY CARE | Facility: CLINIC | Age: 44
End: 2024-05-14
Payer: COMMERCIAL

## 2024-05-14 ENCOUNTER — APPOINTMENT (OUTPATIENT)
Dept: ENDOCRINOLOGY | Facility: CLINIC | Age: 44
End: 2024-05-14
Payer: COMMERCIAL

## 2024-05-14 DIAGNOSIS — C7B.8 METASTATIC MALIGNANT NEUROENDOCRINE TUMOR TO LIVER (MULTI): ICD-10-CM

## 2024-05-14 DIAGNOSIS — Z78.9 WEIGHT LOSS ADVISED: ICD-10-CM

## 2024-05-14 PROCEDURE — 1036F TOBACCO NON-USER: CPT | Performed by: FAMILY MEDICINE

## 2024-05-14 PROCEDURE — 3008F BODY MASS INDEX DOCD: CPT | Performed by: FAMILY MEDICINE

## 2024-05-14 PROCEDURE — 99214 OFFICE O/P EST MOD 30 MIN: CPT | Performed by: FAMILY MEDICINE

## 2024-05-14 RX ORDER — SEMAGLUTIDE 0.25 MG/.5ML
0.25 INJECTION, SOLUTION SUBCUTANEOUS
Qty: 2 ML | Refills: 0 | Status: SHIPPED | OUTPATIENT
Start: 2024-05-19 | End: 2024-06-06 | Stop reason: DRUGHIGH

## 2024-05-14 ASSESSMENT — PAIN SCALES - GENERAL: PAINLEVEL: 0-NO PAIN

## 2024-05-14 ASSESSMENT — PATIENT HEALTH QUESTIONNAIRE - PHQ9
2. FEELING DOWN, DEPRESSED OR HOPELESS: NOT AT ALL
SUM OF ALL RESPONSES TO PHQ9 QUESTIONS 1 AND 2: 0
1. LITTLE INTEREST OR PLEASURE IN DOING THINGS: NOT AT ALL

## 2024-05-14 NOTE — PROGRESS NOTES
Outpatient Visit Note    Chief Complaint   Patient presents with    Ozempic     Pt would like to try Ozempic. Pt had called a month ago stating her insurance will cover but would need to be sent to Pre Auth dept.       With patient's permission, this is a Telemedicine visit with video and audio. The provider and patient participated in this telemedicine encounter.    HPI:  Boroke Chu is a 44 y.o. female with PMH significant for unspecified neuroendocrine cancer followed by Oncology at  who presents to the office via telemedicine encounter with interest in discussing Ozempic.  She was last seen in the office on 1/5/2024 via telemedicine encounter secondary to acute sinus infection..    She reports difficulty with losing weight.  States they have had significant weight loss after having children.  Has attempted dietary modification for weight loss was difficult to accomplish particularly with her neuroendocrine cancer.  Does have interest in trial of weekly injectable Wegovy.  States to have contacted her insurance which seem to indicate that this would be covered.          Current Medications  Current Outpatient Medications   Medication Instructions    fexofenadine (ALLEGRA) 60 mg, oral, Daily    furosemide (LASIX ORAL) oral, As needed    ibuprofen 200 mg tablet 1 tablet, oral, 3 times daily PRN, with food or milk    [START ON 5/19/2024] Wegovy 0.25 mg, subcutaneous, Once Weekly        Allergies  Allergies   Allergen Reactions    Methylprednisolone Other and Unknown     Abdominal pain    Other Unknown     steroids        Past Medical History:   Diagnosis Date    Chronic headaches     Dizziness     Fibroids     Fibroids     Hypoglycemia     Liver mass     Neuroendocrine cancer (Multi)     Other bursal cyst, unspecified wrist     Synovial cyst of wrist    Ovarian cyst     Ovarian cyst     Streptococcus pharyngitis 03/19/2024      Past Surgical History:   Procedure Laterality Date    CERVICAL FUSION       CHOLECYSTECTOMY      COLONOSCOPY      CT GUIDED PERCUTANEOUS PERITONEAL OR RETROPERITONEAL FLUID COLLECTION DRAINAGE  11/03/2020    CT GUIDED PERCUTANEOUS PERITONEAL OR RETROPERITONEAL FLUID COLLECTION DRAINAGE 11/3/2020 [facility] [place] [facility]GAC[facility]    OTHER SURGICAL HISTORY  08/24/2022    Neck surgery    OTHER SURGICAL HISTORY  11/14/2020    Hepatectomy partial    OTHER SURGICAL HISTORY  11/14/2020    Liver tumor ablation    OTHER SURGICAL HISTORY      Liver biospy    SMALL BOWEL ENTEROSCOPY      TUBAL LIGATION      US GUIDED NEEDLE LIVER BIOPSY  07/23/2020    US GUIDED NEEDLE LIVER BIOPSY 7/23/2020 [facility] [place] [facility]GAC[facility]    WRIST SURGERY       Family History   Problem Relation Name Age of Onset    Other (adult respiratory distress sydrome) Mother      Other (cardiac disorder) Mother      COPD Mother      Crohn's disease Mother      Hypothyroidism Mother      Hyperthyroidism Mother      Heart attack Mother      Cerebral aneurysm Father      Hyperthyroidism Other Grandmother     Hypothyroidism Other Grandmother     Diabetes Other Grandfather     Diabetes Other Aunt     Hyperthyroidism Other Aunt     Hypothyroidism Other Aunt     Diabetes Paternal Great-Grandmother       Social History     Tobacco Use    Smoking status: Never     Passive exposure: Never    Smokeless tobacco: Never   Vaping Use    Vaping status: Never Used   Substance Use Topics    Alcohol use: Never    Drug use: Never     Tobacco Use: Low Risk  (5/14/2024)    Patient History     Smoking Tobacco Use: Never     Smokeless Tobacco Use: Never     Passive Exposure: Never        ROS  All pertinent positive symptoms are included in the history of present illness.  All other systems have been reviewed and are negative and noncontributory to this patient's current ailments.    VITAL SIGNS  Patient is unable to provide    PHYSICAL EXAM  GENERAL APPEARANCE:  Alert and oriented x 3, Pleasant and cooperative, No acute distress.   LUNGS:  No conversational dyspnea or cough during  encounter.   PSYCH:  appropriate mood and affect, no difficulty with speech.   Telemedicine visit, no other exam component done.      Assessment/Plan   Problem List Items Addressed This Visit             ICD-10-CM    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 (Multi) C7B.8     - Continue to follow with established specialist per protocol         BMI 35.0-35.9,adult - Primary Z68.35     - With current BMI and difficulties with losing weight via lifestyle modification, will attempt to initiate Wegovy therapy with prescription sent to pharmacy  -If we are able to successfully initiate medication, will plan for follow-up in 1 month to check on effectiveness/tolerance; if there are any interval issues, please contact office         Relevant Medications    semaglutide, weight loss, (Wegovy) 0.25 mg/0.5 mL pen injector (Start on 5/19/2024)     Other Visit Diagnoses         Codes    Weight loss advised     Z78.9    Relevant Medications    semaglutide, weight loss, (Wegovy) 0.25 mg/0.5 mL pen injector (Start on 5/19/2024)            Counseling:       Medication education:         Education:  The patient is counseled regarding potential side-effects of all new medications        Understanding:  Patient expressed understanding        Adherence:  No barriers to adherence identified    ** Please excuse any errors in grammar or translation related to this dictation. Voice recognition software was utilized to prepare this document. **

## 2024-05-14 NOTE — ASSESSMENT & PLAN NOTE
- With current BMI and difficulties with losing weight via lifestyle modification, will attempt to initiate Wegovy therapy with prescription sent to pharmacy  -If we are able to successfully initiate medication, will plan for follow-up in 1 month to check on effectiveness/tolerance; if there are any interval issues, please contact office

## 2024-05-20 ENCOUNTER — HOSPITAL ENCOUNTER (OUTPATIENT)
Dept: RADIOLOGY | Facility: HOSPITAL | Age: 44
Discharge: HOME | End: 2024-05-20
Payer: COMMERCIAL

## 2024-05-20 ENCOUNTER — PREP FOR PROCEDURE (OUTPATIENT)
Dept: RADIOLOGY | Facility: HOSPITAL | Age: 44
End: 2024-05-20

## 2024-05-20 VITALS
OXYGEN SATURATION: 98 % | SYSTOLIC BLOOD PRESSURE: 150 MMHG | RESPIRATION RATE: 22 BRPM | DIASTOLIC BLOOD PRESSURE: 85 MMHG | HEART RATE: 93 BPM

## 2024-05-20 DIAGNOSIS — R16.0 LIVER MASS: ICD-10-CM

## 2024-05-20 DIAGNOSIS — C7A.8 NEUROENDOCRINE CANCER (MULTI): ICD-10-CM

## 2024-05-20 DIAGNOSIS — R16.0 LIVER MASS: Primary | ICD-10-CM

## 2024-05-20 PROCEDURE — 2550000001 HC RX 255 CONTRASTS: Performed by: RADIOLOGY

## 2024-05-20 PROCEDURE — 76978 US TRGT DYN MBUBB 1ST LES: CPT | Mod: 59

## 2024-05-20 PROCEDURE — 76999 ECHO EXAMINATION PROCEDURE: CPT

## 2024-05-20 RX ADMIN — SULFUR HEXAFLUORIDE 24.28 MG: KIT at 11:27

## 2024-05-20 RX ADMIN — SULFUR HEXAFLUORIDE 12.14 MG: KIT at 12:00

## 2024-05-20 RX ADMIN — SULFUR HEXAFLUORIDE 24.28 MG: KIT at 11:45

## 2024-05-20 ASSESSMENT — ENCOUNTER SYMPTOMS
CARDIOVASCULAR NEGATIVE: 1
ABDOMINAL PAIN: 1
MUSCULOSKELETAL NEGATIVE: 1
NEUROLOGICAL NEGATIVE: 1
ROS GI COMMENTS: INTERMITTENT
CONSTITUTIONAL NEGATIVE: 1
PSYCHIATRIC NEGATIVE: 1
RESPIRATORY NEGATIVE: 1

## 2024-05-20 NOTE — CONSULTS
INTERVENTIONAL RADIOLOGY OUTPATIENT CONSULTATION  [facility]    Subjective  Brooke Chu, 44 y.o. 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 The possibility of origin in pancreas and duodenum should be excluded.   MRI showed large hypervascular heterogeneously enhancing hepatic mass lesion in segment 7 and 8 and multiple other satellite hepatic lesions involving right and left hepatic lobes. Stable subcentimeter zayda hepatis lymph nodes. No other significant lymphadenopathy  or free intraperitoneal fluid.  PET-NET showed Dominant centrally necrotic somatostatin receptor expressing mass within hepatic segments 7 and 8. Additional satellite lesions in the bilateral hepatic lobes as described above. Somatostatin avid Left internal iliac lymph nodes. No additional  sites of abnormal Ga-68 dotatate uptake suggestive of malignancy identified.  PET-FDG didnt show any extra glucose uptake  Colonoscopy and SB enteroscopy didn't show any primary lesion  10/27/2020: She had partial hepatectomy (segment 3, 5, 6), liver ablation (segment 5, 3), cholecystectomy and pth confirmed G2 well diff NET (kI67 3.1%)        Patient of Dr. Arango referred to IR for ablation of liver lesions. She states liver resection was in roughly 2021 and is unsure if they did ablation at time of resection.    Patient returns to clinic  "following ablation. She states that her pain is well managed, still c/o \"twinges\" of pain when bending over or twisting at torso that resolve within seconds.    S/p liver lesions ablation 4/22/24.    Review of Systems   Constitutional: Negative.    Respiratory: Negative.     Cardiovascular: Negative.    Gastrointestinal:  Positive for abdominal pain.        Intermittent   Musculoskeletal: Negative.    Skin: Negative.    Neurological: Negative.    Psychiatric/Behavioral: Negative.         Past Medical History:   Diagnosis Date    Chronic headaches     Dizziness     Fibroids     Fibroids     Hypoglycemia     Liver mass     Neuroendocrine cancer (Multi)     Other bursal cyst, unspecified wrist     Synovial cyst of wrist    Ovarian cyst     Ovarian cyst     Streptococcus pharyngitis 03/19/2024     Past Surgical History:   Procedure Laterality Date    CERVICAL FUSION      CHOLECYSTECTOMY      COLONOSCOPY      CT GUIDED PERCUTANEOUS PERITONEAL OR RETROPERITONEAL FLUID COLLECTION DRAINAGE  11/03/2020    CT GUIDED PERCUTANEOUS PERITONEAL OR RETROPERITONEAL FLUID COLLECTION DRAINAGE 11/3/2020 [facility] INPATIENT LEGACY    OTHER SURGICAL HISTORY  08/24/2022    Neck surgery    OTHER SURGICAL HISTORY  11/14/2020    Hepatectomy partial    OTHER SURGICAL HISTORY  11/14/2020    Liver tumor ablation    OTHER SURGICAL HISTORY      Liver biospy    SMALL BOWEL ENTEROSCOPY      TUBAL LIGATION      US GUIDED NEEDLE LIVER BIOPSY  07/23/2020    US GUIDED NEEDLE LIVER BIOPSY 7/23/2020 [facility] AIB LEGACY    WRIST SURGERY       Social History     Socioeconomic History    Marital status:      Spouse name: Not on file    Number of children: Not on file    Years of education: Not on file    Highest education level: Not on file   Occupational History    Not on file   Tobacco Use    Smoking status: Never     Passive exposure: Never    Smokeless tobacco: Never   Vaping Use    Vaping status: Never Used   Substance and Sexual Activity    Alcohol use: " Never    Drug use: Never    Sexual activity: Defer   Other Topics Concern    Not on file   Social History Narrative    Not on file     Social Determinants of Health     Financial Resource Strain: Not on file   Food Insecurity: Not on file   Transportation Needs: Not on file   Physical Activity: Not on file   Stress: No Stress Concern Present (3/22/2024)    [employer] [place] [employer] - Occupational Stress Questionnaire     Feeling of Stress : Not at all   Social Connections: Not on file   Intimate Partner Violence: Not on file   Housing Stability: Not on file     Family History   Problem Relation Name Age of Onset    Other (adult respiratory distress sydrome) Mother      Other (cardiac disorder) Mother      COPD Mother      Crohn's disease Mother      Hypothyroidism Mother      Hyperthyroidism Mother      Heart attack Mother      Cerebral aneurysm Father      Hyperthyroidism Other Grandmother     Hypothyroidism Other Grandmother     Diabetes Other Grandfather     Diabetes Other Aunt     Hyperthyroidism Other Aunt     Hypothyroidism Other Aunt     Diabetes Paternal Great-Grandmother       Allergies   Allergen Reactions    Methylprednisolone Other and Unknown     Abdominal pain    Other Unknown     steroids     Current Outpatient Medications on File Prior to Encounter   Medication Sig Dispense Refill    fexofenadine (Allegra) 60 mg tablet Take 1 tablet (60 mg) by mouth once daily.      furosemide (LASIX ORAL) Take by mouth if needed.      ibuprofen 200 mg tablet Take 1 tablet (200 mg) by mouth 3 times a day as needed. with food or milk      semaglutide, weight loss, (Wegovy) 0.25 mg/0.5 mL pen injector Inject 0.25 mg under the skin 1 (one) time per week. 2 mL 0    [DISCONTINUED] lanreotide (Somatuline Depot) 120 mg/0.5 mL syringe Inject under the skin.       No current facility-administered medications on file prior to encounter.       Objective    Vitals:    05/20/24 1043   BP: 150/85   Pulse: 93   Resp:  22   SpO2: 98%       Physical Exam  Constitutional:       Appearance: Normal appearance. She is normal weight.   Cardiovascular:      Rate and Rhythm: Normal rate and regular rhythm.      Pulses: Normal pulses.      Heart sounds: Normal heart sounds.   Pulmonary:      Effort: Pulmonary effort is normal.      Breath sounds: Normal breath sounds.   Abdominal:      General: Bowel sounds are normal.      Palpations: Abdomen is soft.   Musculoskeletal:         General: Normal range of motion.      Cervical back: Normal range of motion and neck supple.   Skin:     General: Skin is warm and dry.      Comments: Ablation needle sites c/d/i   Neurological:      General: No focal deficit present.      Mental Status: She is alert and oriented to person, place, and time. Mental status is at baseline.   Psychiatric:         Mood and Affect: Mood normal.         Behavior: Behavior normal.         Thought Content: Thought content normal.         Judgment: Judgment normal.     Biopsy results from 4/22/24:  The liver contains a metastatic neuroendocrine tumor that expresses INSM1, chromogranin and SSTR2 as well as CDX2. It expresses CK7 but not CK20. Staining for serotonin is negative. The Ki67 labeling index is 3.6%. The features are similar to those in previous biopsies.       Pertinent Imaging  MRI Liver 2/19/24:  IMPRESSION:  1.  Status post partial right hepatectomy with stable diffusion  restricting and enhancing hepatic segment 8 lesion and stable  arterial enhancing hepatic segment 5 lesion; these correspond with  foci of activity on same day PET-CT. No new hepatic lesions or  evidence of metastatic disease within the abdomen. Continued  attention on follow-up is recommended.  2. No abdominal lymphadenopathy.    Ultrasound with contrast was performed today, please refer to imaging study for further detail.    Assessment & Plan     Ultrasound with contrast was performed today, please refer to imaging study for further  detail.    Given current subjective complaints of pain, we discussed procedure details in clinic and giving her body a few more weeks to help. She is agreeable to this plan.    Discussed continued follow up with Dr. Bey, please contact IR if further assessment is needed.    Case seen and staffed with TON Wiley MD    Vascular and Interventional Radiology  [facility]  [phone] Nursing  [phone] Scheduling      Consults

## 2024-06-06 ENCOUNTER — TELEMEDICINE (OUTPATIENT)
Dept: PRIMARY CARE | Facility: CLINIC | Age: 44
End: 2024-06-06
Payer: COMMERCIAL

## 2024-06-06 VITALS — WEIGHT: 220 LBS | BODY MASS INDEX: 35.51 KG/M2

## 2024-06-06 DIAGNOSIS — Z78.9 WEIGHT LOSS ADVISED: ICD-10-CM

## 2024-06-06 PROCEDURE — 3008F BODY MASS INDEX DOCD: CPT | Performed by: FAMILY MEDICINE

## 2024-06-06 PROCEDURE — 1036F TOBACCO NON-USER: CPT | Performed by: FAMILY MEDICINE

## 2024-06-06 PROCEDURE — 99214 OFFICE O/P EST MOD 30 MIN: CPT | Performed by: FAMILY MEDICINE

## 2024-06-06 RX ORDER — SEMAGLUTIDE 0.5 MG/.5ML
0.5 INJECTION, SOLUTION SUBCUTANEOUS
Qty: 2 ML | Refills: 0 | Status: SHIPPED | OUTPATIENT
Start: 2024-06-09 | End: 2024-07-09

## 2024-06-06 ASSESSMENT — PATIENT HEALTH QUESTIONNAIRE - PHQ9
SUM OF ALL RESPONSES TO PHQ9 QUESTIONS 1 AND 2: 0
1. LITTLE INTEREST OR PLEASURE IN DOING THINGS: NOT AT ALL
2. FEELING DOWN, DEPRESSED OR HOPELESS: NOT AT ALL

## 2024-06-06 ASSESSMENT — PAIN SCALES - GENERAL: PAINLEVEL: 0-NO PAIN

## 2024-06-06 NOTE — ASSESSMENT & PLAN NOTE
- Will continue with plans to adjust the next dose  -Please let me know if you have any access issues with pharmacy  -Will plan for communication before end of month to see how we are doing and if we will proceed with further dose adjustments

## 2024-06-06 NOTE — PROGRESS NOTES
Outpatient Visit Note    Chief Complaint   Patient presents with    Follow-up     1 month f/u       With patient's permission, this is a Telemedicine visit with video and audio. The provider and patient participated in this telemedicine encounter.    HPI:  Brooke Chu is a 44 y.o. female with PMH significant for unspecified neuroendocrine cancer followed by Oncology at  who presents to the office via telemedicine encounter for medication follow-up.  She was last seen in the office on 5/14/2024 secondary to interest in discussing Ozempic.    At last encounter she reported difficulty with losing weight.  Stated to have had significant weight loss after having children.  Had attempted dietary modification for weight loss was difficult to accomplish particularly with her neuroendocrine cancer.  Expressed have interest in trial of weekly injectable Wegovy.  Stated to have contacted her insurance which seem to indicate that this would be covered to which initial Wegovy prescription was sent.    Today she reports compliance with Wegovy denying any adverse side effects of GI upset.  Has noted changes in her appetite though denies any significant weight loss.  Denies having scale to personally monitor her weight though she has not appreciated any physical changes.  Is interested in pursuing next therapeutic dose.  No other acute complaints reported.      Current Medications  Current Outpatient Medications   Medication Instructions    fexofenadine (ALLEGRA) 60 mg, oral, Daily    furosemide (LASIX) 20 mg, oral, As needed    ibuprofen 200 mg tablet 1 tablet, oral, 3 times daily PRN, with food or milk    [START ON 6/9/2024] Wegovy 0.5 mg, subcutaneous, Once Weekly        Allergies  Allergies   Allergen Reactions    Corticosteroids (Glucocorticoids) Unknown    Methylprednisolone Other and Unknown     Abdominal pain    Other Unknown     steroids        Past Medical History:   Diagnosis Date    Chronic headaches      Dizziness     Fibroids     Fibroids     Hypoglycemia     Liver mass     Neuroendocrine cancer (Multi)     Other bursal cyst, unspecified wrist     Synovial cyst of wrist    Ovarian cyst     Ovarian cyst     Streptococcus pharyngitis 03/19/2024      Past Surgical History:   Procedure Laterality Date    CERVICAL FUSION      CHOLECYSTECTOMY      COLONOSCOPY      CT GUIDED PERCUTANEOUS PERITONEAL OR RETROPERITONEAL FLUID COLLECTION DRAINAGE  11/03/2020    CT GUIDED PERCUTANEOUS PERITONEAL OR RETROPERITONEAL FLUID COLLECTION DRAINAGE 11/3/2020 [facility] INPATIENT LEGACY    OTHER SURGICAL HISTORY  08/24/2022    Neck surgery    OTHER SURGICAL HISTORY  11/14/2020    Hepatectomy partial    OTHER SURGICAL HISTORY  11/14/2020    Liver tumor ablation    OTHER SURGICAL HISTORY      Liver biospy    SMALL BOWEL ENTEROSCOPY      TUBAL LIGATION      US GUIDED NEEDLE LIVER BIOPSY  07/23/2020    US GUIDED NEEDLE LIVER BIOPSY 7/23/2020 [facility] AIB LEGACY    WRIST SURGERY       Family History   Problem Relation Name Age of Onset    Other (adult respiratory distress sydrome) Mother      Other (cardiac disorder) Mother      COPD Mother      Crohn's disease Mother      Hypothyroidism Mother      Hyperthyroidism Mother      Heart attack Mother      Cerebral aneurysm Father      Hyperthyroidism Other Grandmother     Hypothyroidism Other Grandmother     Diabetes Other Grandfather     Diabetes Other Aunt     Hyperthyroidism Other Aunt     Hypothyroidism Other Aunt     Diabetes Paternal Great-Grandmother       Social History     Tobacco Use    Smoking status: Never     Passive exposure: Never    Smokeless tobacco: Never   Vaping Use    Vaping status: Never Used   Substance Use Topics    Alcohol use: Never    Drug use: Never     Tobacco Use: Low Risk  (6/6/2024)    Patient History     Smoking Tobacco Use: Never     Smokeless Tobacco Use: Never     Passive Exposure: Never        ROS  All pertinent positive symptoms are included in the history of present  illness.  All other systems have been reviewed and are negative and noncontributory to this patient's current ailments.    VITAL SIGNS  Patient is unable to provide    PHYSICAL EXAM  GENERAL APPEARANCE:  Alert and oriented x 3, Pleasant and cooperative, No acute distress.   LUNGS:  No conversational dyspnea or cough during encounter.   PSYCH:  appropriate mood and affect, no difficulty with speech.   Telemedicine visit, no other exam component done.      Assessment/Plan   Problem List Items Addressed This Visit             ICD-10-CM    BMI 35.0-35.9,adult - Primary Z68.35     - Will continue with plans to adjust the next dose  -Please let me know if you have any access issues with pharmacy  -Will plan for communication before end of month to see how we are doing and if we will proceed with further dose adjustments         Relevant Medications    semaglutide, weight loss, (Wegovy) 0.5 mg/0.5 mL pen injector (Start on 6/9/2024)     Other Visit Diagnoses         Codes    Weight loss advised     Z78.9    Relevant Medications    semaglutide, weight loss, (Wegovy) 0.5 mg/0.5 mL pen injector (Start on 6/9/2024)            Counseling:       Medication education:         Education:  The patient is counseled regarding potential side-effects of all new medications        Understanding:  Patient expressed understanding        Adherence:  No barriers to adherence identified    ** Please excuse any errors in grammar or translation related to this dictation. Voice recognition software was utilized to prepare this document. **

## 2024-06-10 ENCOUNTER — HOSPITAL ENCOUNTER (OUTPATIENT)
Dept: RADIOLOGY | Facility: HOSPITAL | Age: 44
Discharge: HOME | End: 2024-06-10
Payer: COMMERCIAL

## 2024-06-10 DIAGNOSIS — C7A.8 NEUROENDOCRINE CANCER (MULTI): ICD-10-CM

## 2024-06-10 PROCEDURE — 2550000001 HC RX 255 CONTRASTS: Performed by: INTERNAL MEDICINE

## 2024-06-10 PROCEDURE — 72197 MRI PELVIS W/O & W/DYE: CPT

## 2024-06-10 PROCEDURE — 74183 MRI ABD W/O CNTR FLWD CNTR: CPT

## 2024-06-10 PROCEDURE — A9575 INJ GADOTERATE MEGLUMI 0.1ML: HCPCS | Performed by: INTERNAL MEDICINE

## 2024-06-10 RX ORDER — GADOTERATE MEGLUMINE 376.9 MG/ML
0.2 INJECTION INTRAVENOUS
Status: COMPLETED | OUTPATIENT
Start: 2024-06-10 | End: 2024-06-10

## 2024-06-10 RX ADMIN — GADOTERATE MEGLUMINE 20 ML: 376.9 INJECTION INTRAVENOUS at 11:01

## 2024-06-24 ENCOUNTER — TELEPHONE (OUTPATIENT)
Dept: HEMATOLOGY/ONCOLOGY | Facility: HOSPITAL | Age: 44
End: 2024-06-24
Payer: COMMERCIAL

## 2024-06-26 ENCOUNTER — OFFICE VISIT (OUTPATIENT)
Dept: HEMATOLOGY/ONCOLOGY | Facility: HOSPITAL | Age: 44
End: 2024-06-26
Payer: COMMERCIAL

## 2024-06-26 VITALS
WEIGHT: 221.12 LBS | HEART RATE: 66 BPM | TEMPERATURE: 97.7 F | OXYGEN SATURATION: 100 % | SYSTOLIC BLOOD PRESSURE: 125 MMHG | DIASTOLIC BLOOD PRESSURE: 75 MMHG | RESPIRATION RATE: 18 BRPM | BODY MASS INDEX: 35.69 KG/M2

## 2024-06-26 DIAGNOSIS — C7A.8 NEUROENDOCRINE CANCER (MULTI): ICD-10-CM

## 2024-06-26 PROCEDURE — 3008F BODY MASS INDEX DOCD: CPT | Performed by: INTERNAL MEDICINE

## 2024-06-26 PROCEDURE — 99215 OFFICE O/P EST HI 40 MIN: CPT | Performed by: INTERNAL MEDICINE

## 2024-06-26 PROCEDURE — 1036F TOBACCO NON-USER: CPT | Performed by: INTERNAL MEDICINE

## 2024-06-26 RX ORDER — ONDANSETRON 4 MG/1
4 TABLET, FILM COATED ORAL EVERY 8 HOURS PRN
COMMUNITY

## 2024-06-26 ASSESSMENT — PAIN SCALES - GENERAL: PAINLEVEL: 0-NO PAIN

## 2024-06-26 NOTE — PROGRESS NOTES
Patient ID: Brooke Chu is a 44 y.o. female.  Visit type: Follow up telephone visit    A telephone visit (audio only) between the patient (at the originating site) and the provider (at the distant site) was utilized to provide this telehealth service.   Verbal consent was requested and obtained from Brooke Chu on this date, 06/26/24 for a telehealth visit.       Current Therapy  Treatment Plan:  Lanreotide, Every 28 Days      ONCOLOGIC HISTORY    44-year-old woman with a several year history of neck pain on the Rt side and for exploration they did full imaging include CT neck, chest and AP on 06/30/2020  which showed a large lobulated heterogeneously enhancing mass lesion involving segments 7 and 8 of the right hepatic lobe measuring approximately 13.2 x 10.5 x 9.7 cm in size. 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   MRI showed large hypervascular heterogeneously enhancing hepatic mass lesion in segment 7 and 8 and multiple other satellite hepatic lesions involving right and left hepatic lobes. Stable subcentimeter zayda hepatis lymph nodes. No other significant lymphadenopathy  or free intraperitoneal fluid.  PET-NET showed Dominant centrally necrotic somatostatin receptor expressing mass within hepatic segments 7 and 8. Additional satellite lesions in the bilateral hepatic lobes as described above. Somatostatin avid Left internal iliac lymph nodes. No additional  sites of abnormal Ga-68 dotatate uptake suggestive of malignancy identified.  PET-FDG didn't show any extra glucose uptake  Colonoscopy and SB enteroscopy didn't show any primary lesion. Symptoms of diarrhea and intermittent flushing s/p 1 dose of lanreotide after which she had severe bloating and abdominal pain as well as diarrhea   10/27: She had partial hepatectomy (segment 3, 5, 6), liver ablation  (segment 5, 3), cholecystectomy   She recovered well with surgery. She was tested positive for COVID  and only symptoms she has was loss of taste and stuffy nose.   She started on lanreotide but d/t multiple side effects including gas, bloating, and some hypoglycemia episodes she was switched to Octreotide 30 mg in December of 2021  MRI abd/pelvis 1/7/2021 appears stable but limited imaging d/t inability to do with contrast as IV infiltrated during scan per patient.     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 No abdominal pain, no nausea, or vomiting.    ROS  All 14 systems have been reviewed and were negative except for the HPI.      OBJECTIVE:    VS:  /75 (BP Location: Left arm, Patient Position: Sitting, BP Cuff Size: Adult)   Pulse 66   Temp 36.5 °C (97.7 °F) (Temporal)   Resp 18   Wt 100 kg (221 lb 1.9 oz)   SpO2 100%   BMI 35.69 kg/m²   Weight:   Vitals:    06/26/24 0939   Weight: 100 kg (221 lb 1.9 oz)       BSA: 2.16 meters squared      General: Appears well and in NAD  Eyes: PERRL, EOMI, clear sclera  ENMT: mucous membranes moist, no apparent injury, no lesions seen  Head/Neck: Neck supple, no apparent injury, thyroid without mass or tenderness, No JVD, trachea midline,   Thorax: Patent airways, CTAB, normal breath sounds with good chest expansion, thorax symmetric  Cardiovascular: Regular, rate and rhythm, no murmurs, 2+ equal pulses of the extremities, normal S 1and S 2  Gastrointestinal: Nondistended, soft, non-tender, no rebound tenderness or guarding, no masses palpable, no organomegaly, +BS  Musculoskeletal: ROM intact, no joint swelling, normal strength  Extremities: normal extremities, no cyanosis edema, contusions or wounds, no clubbing  Neurological: alert and oriented x3, intact senses, motor, response and reflexes, normal strength  Lymphatic: No  significant lymphadenopathy  Psychological: Appropriate mood and behavior  Skin: Warm and dry, no lesions, no rashes      Diagnostic Results     Comprehensive Metabolic Panel              Component  Ref Range & Units 2 wk ago  (1/24/24) 4 mo ago  (10/6/23) 7 mo ago  (7/12/23) 10 mo ago  (3/29/23) 1 yr ago  (10/24/22) 1 yr ago  (8/27/22) 1 yr ago  (7/20/22)   Glucose  74 - 99 mg/dL 90 112 High  94 121 High  103 High  97 98   Sodium  136 - 145 mmol/L 140 137 143 140 139 137 136   Potassium  3.5 - 5.3 mmol/L 4.8 4.1 4.1 4.3 3.8 4.1 4.0   Chloride  98 - 107 mmol/L 106 105 108 High  105 108 High  107 103   Bicarbonate  21 - 32 mmol/L 25 25 28 28 23 21 26   Anion Gap  10 - 20 mmol/L 14 11 11 11 12 13 11   Urea Nitrogen  6 - 23 mg/dL 11 15 13 9 10 12 11   Creatinine  0.50 - 1.05 mg/dL 0.73 0.77 0.81 0.63 0.63 0.61 0.74   eGFR  >60 mL/min/1.73m*2 >90 >90 CM        Comment: Calculations of estimated GFR are performed using the 2021 CKD-EPI Study Refit equation without the race variable for the IDMS-Traceable creatinine methods.  https://jasn.asnjournals.org/content/early/2021/09/22/ASN.0030000776   Calcium  8.6 - 10.3 mg/dL 8.9 9.1 9.4 9.4 R 8.7 8.5 Low  8.7   Albumin  3.4 - 5.0 g/dL 4.1 4.1 4.2 4.2 4.0  4.1   Alkaline Phosphatase  33 - 110 U/L 80 73 70 68 63  65   Total Protein  6.4 - 8.2 g/dL 6.5 7.3 7.1 7.0 6.8  7.1   AST  9 - 39 U/L 16 19 14 13 13  20   Bilirubin, Total  0.0 - 1.2 mg/dL 0.4 0.4 0.3 0.4 0.5  0.4   ALT  7 - 45 U/L 15 19 CM 11 CM            Contains abnormal data CBC and Auto Differential                Component  Ref Range & Units 2 wk ago  (1/24/24) 4 mo ago  (10/6/23) 7 mo ago  (7/12/23) 10 mo ago  (3/29/23) 1 yr ago  (10/24/22) 1 yr ago  (7/20/22) 1 yr ago  (7/8/22)   WBC  4.4 - 11.3 x10*3/uL 11.8 High  7.5 9.5 R 7.9 R 7.4 R 6.8 R 9.0 R   nRBC  0.0 - 0.0 /100 WBCs 0.0 0.0        RBC  4.00 - 5.20 x10*6/uL 4.95 4.80 4.73 R 4.79 R 4.55 R 4.54 R 4.71 R   Hemoglobin  12.0 - 16.0 g/dL 14.7 14.6 14.2 14.1 13.6  13.4 14.0   Hematocrit  36.0 - 46.0 % 45.4 44.1 42.5 42.4 41.6 42.1 43.1   MCV  80 - 100 fL 92 92 90 89 91 93 92   MCH  26.0 - 34.0 pg 29.7 30.4        MCHC  32.0 - 36.0 g/dL 32.4 33.1 33.4 33.3 32.7 31.8 Low  32.5   RDW  11.5 - 14.5 % 13.0 12.9 13.5 13.5 13.3 13.2 12.9   Platelets  150 - 450 x10*3/uL 244 229 205 R 217 R 183 R 191 R 220 R   Neutrophils %  40.0 - 80.0 % 67.3 55.6 63.3 55.6 56.8 54.0 64.2   Immature Granulocytes %, Automated  0.0 - 0.9 % 0.4 0.3 CM 0.5 CM 0.4 CM 0.1 CM 0.6 CM 0.2 CM   Comment: Immature Granulocyte Count (IG) includes promyelocytes, myelocytes and metamyelocytes but does not include bands. Percent differential counts (%) should be interpreted in the context of the absolute cell counts (cells/UL).   Lymphocytes %  13.0 - 44.0 % 23.3 33.8 26.2 33.2 31.8 34.2 25.6 CM   Monocytes %  2.0 - 10.0 % 6.9 7.5 7.2 7.9 8.4 7.9 7.6   Eosinophils %  0.0 - 6.0 % 1.6 2.0 2.3 2.3 2.4 2.3 2.0   Basophils %  0.0 - 2.0 % 0.5 0.8 0.5 0.6 0.5 1.0 0.4   Neutrophils Absolute  1.20 - 7.70 x10*3/uL 7.90 High  4.16 CM 6.02 R 4.41 R 4.20 R 3.67 R 5.78 R   Comment: Percent differential counts (%) should be interpreted in the context of the absolute cell counts (cells/uL).   Immature Granulocytes Absolute, Automated  0.00 - 0.70 x10*3/uL 0.05 0.02        Lymphocytes Absolute  1.20 - 4.80 x10*3/uL 2.74 2.53 2.49 R 2.64 R 2.35 R 2.33 R 2.30 R   Monocytes Absolute  0.10 - 1.00 x10*3/uL 0.81 0.56 0.69 R 0.63 R 0.62 R 0.54 R 0.68 R   Eosinophils Absolute  0.00 - 0.70 x10*3/uL 0.19 0.15 0.22 R 0.18 R 0.18 R 0.16 R 0.18 R   Basophils Absolute  0.00 - 0.10 x10*3/uL 0.06 0.06 0.05 R 0.05 R 0.04 R 0.07 R 0.04 R   MPV  11.1 R             5-Hydroxyindoleacetic Acid,Plasma          Component  Ref Range & Units 2 wk ago 4 mo ago 10 mo ago   5-Hydroxyindoleacetic Acid (5-HIAA),Plasma  ng/mL 5.7 6.2 CM 3.3 CM          MR pelvis w and wo IV contrast, MR abdomen w and wo IV contrast  Narrative: Interpreted By:  Jaya Hernandez,    STUDY:  MR ABDOMEN W AND WO IV CONTRAST; MR PELVIS W AND WO IV CONTRAST;  6/10/2024 11:15 am      INDICATION:  Signs/Symptoms:NET restaging; Signs/Symptoms:NET RESTAGING.      COMPARISON:  02/19/2014.      ACCESSION NUMBER(S):  [id number]; [id number]      ORDERING CLINICIAN:  LIGIA MOON      TECHNIQUE:  Axial and coronal T1 and T2 weighted sequences the abdomen and pelvis  were obtained. 20 ml of  Gadolinium contrast agent Dotarem were  administered intravenously without immediate complication.      FINDINGS:  LIVER:  Post partial right hepatectomy changes are again seen. Micrometallic  artifact marginates the resection site. There appears to be a new  nonenhancing ablation zone which coincides with a previous punctate  focus of restricted diffusion near the junction of segments V and  VIII. The previous restricted diffusion at the site is no longer  seen. There is a 7 mm arterial enhancing nodule in segment VIII  anteriorly which appears similar to prior exam on series 32, image  19/88. No definite restricted diffusion at this site is otherwise  visualized. There appears to be an adjacent nonenhancing ablation  zone slightly lateral to this.. No definite additional hepatic  lesions are identified.      BILE DUCTS:  No dilatation.      GALLBLADDER:  Absent.      PANCREAS:  Unremarkable. No ductal dilatation. No pancreas divisum.      SPLEEN:  Unremarkable.      ADRENAL GLANDS:  Unremarkable.      KIDNEYS/GENITOURINARY:  Small left renal cortical cysts. Otherwise unremarkable kidneys  without hydronephrosis. The urinary bladder is partially distended.  The uterus is present.      LYMPH NODES:  No lymphadenopathy.      ABDOMINAL VESSELS:  Abdominal aorta is patent without aneurysm. Major visceral arterial  branches are patent. Major portal venous branches are patent. IVC and  visualized major branches are patent. Major pelvic vasculature  appears to be patent.      BOWEL:  No dilated bowel is visualized.       PERITONEUM/RETROPERITONEUM:  Trace pelvic free fluid likely physiologic.      BONES AND LOWER THORAX:  Lower lumbar predominant degenerative changes with mild  dextroscoliosis. Grossly clear lung bases.      Impression: 1.  New ablation sites involving the liver. One of the previous  suspicious diffusion restricting hepatic lesions is no longer  identified. A subcentimeter arterial enhancing nodule in segment VIII  appears similar to prior exam, although no definite restricted  diffusion is identified on this exam. No new hepatic lesions  otherwise identified.  2. No new sites of extrahepatic metastatic disease in the abdomen or  pelvis otherwise identified.          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 The possibility of origin in pancreas and duodenum should be excluded.   MRI showed large hypervascular heterogeneously enhancing hepatic mass lesion in segment 7 and 8 and multiple other satellite hepatic lesions involving right and left hepatic lobes. Stable subcentimeter zayda hepatis lymph nodes. No other significant lymphadenopathy  or free intraperitoneal fluid.  PET-NET showed Dominant centrally necrotic somatostatin receptor expressing mass within hepatic segments 7 and 8. Additional satellite lesions in the bilateral hepatic lobes as described above. Somatostatin  "avid Left internal iliac lymph nodes. No additional  sites of abnormal Ga-68 dotatate uptake suggestive of malignancy identified.  PET-FDG didnt show any extra glucose uptake  Colonoscopy and SB enteroscopy didn't show any primary lesion  10/27/2020: She had partial hepatectomy (segment 3, 5, 6), liver ablation (segment 5, 3), cholecystectomy and pth confirmed G2 well diff NET (kI67 3.1%)    She recovered well with surgery. She was tested positive for COVID  and only symptoms she has was loss of taste and stuffy nose.   04/26/2021: Restaging CT scan showed post surgical changes and postablation changes in the liver with no new lesions  MRI showed At least 4 foci of restricted diffusion are identified in segments 8 and 3 without correlating abnormality on other sequences. However 2 of these foci correlate with hypervascular foci and a prior CT scan.  Findings are nonspecific, however  metastatic foci cannot be excluded  MRI showed stable disease on 08/16/2021  PET-Ga68 on 11/2021 showed multiple small (3-4) somatostatin avid foci are noted scattered throughout the hepatic  parenchyma which appear stable number and relative intensity compared to the prior exam   Octreotide 30 mg started 12/2021 as she was not tolerating lanreotide well with multiple side effects.  MRI Brain 12/13 done for recurrent headaches/ to rule out mets- Scan WNL  MRI abd/pelvis 1/7/2021 appears stable but limited imaging d/t inability to do with contrast as IV infiltrated during scan per patient.   Repeated MRI on 03/2022: Showed stable disease with one new liver lesion   Repeated MRI on 07/23/2022: Showed stable disease   PET scan on 10/14/2022 shows stable disease  MRI AP on 3/6/2023 shows stable disease. 5HIAA 3.3 on 3/29/23  MRI AP on 7/3/2023 shows stable disease  MRI AP on 10/14/23 shows: \"Status post partial right hepatectomy. Stable indeterminate hepatic arterial enhancing foci as detailed above. No new suspicious lesions.\"  MRI AP on " "1/2/24 shows: Few small subcentimeter arterially enhancing foci without signal abnormality on other postcontrast sequences or anatomic sequences  Patient presented to TB with recommendations: \"Liver foci are very small and not sure if these are new NETs. MRI w eovist to compare with MRI on 10/2023 and PET-CU64. Resume SSA and if PET-CU64 only showed liver lesions will proceed with surgery vs ablation\"  PET-CU64 showed stable multiple somatostatin avid lesions are scattered throughout the liver  MRI w eovist showed enhancing hepatic segment 8 lesion and stable arterial enhancing hepatic segment 5 lesion  She had MWA on 04/22/2024  Restaging MRI on 06/2024: Showed New ablation sites involving the liver. One of the previous suspicious diffusion restricting hepatic lesions is no longer identified. A subcentimeter arterial enhancing nodule in segment VIII appears similar to prior exam  We held SSA due to abdominal pain, bloating and fatigue     Plan:    -Patient has no  new complaints  -RTC after scans in 3 months for follow up and to review scan results      Sotero Arango M.D.   and Director of the Neuroendocrine Tumor Program  Gastrointestinal Medical Oncology  Department of Hematology and Oncology  [facility], [place] [address]  Phone (Office): [phone]  Fax:  [phone]   Email: [email or web address]  Learn more about [facility] Comprehensive Neuroendocrine Tumor Program: Click Here  Learn more about [place] [employer]end[employer]: [email or web address]            "

## 2024-06-26 NOTE — PROGRESS NOTES
Bonykristen came in for a FUV with Dr. Aragno. She is doing great. She states she has been taking Wegovy and doing well. Still having pain with pressure on ablation site - MD made aware. She has occasional nausea which is relieved with mint gum or 1, 4mg Zofran tablet. Medications, pharmacy preference and allergies were reviewed with patient and updated in the medical record. Pt next FUV in 3 mos with scans. Patient verbalized understanding and agreement regarding discussed information/plan.

## 2024-07-02 ENCOUNTER — TELEPHONE (OUTPATIENT)
Dept: PRIMARY CARE | Facility: CLINIC | Age: 44
End: 2024-07-02
Payer: COMMERCIAL

## 2024-07-02 DIAGNOSIS — Z78.9 WEIGHT LOSS ADVISED: ICD-10-CM

## 2024-07-02 RX ORDER — SEMAGLUTIDE 1 MG/.5ML
1 INJECTION, SOLUTION SUBCUTANEOUS
Qty: 2 ML | Refills: 0 | Status: SHIPPED | OUTPATIENT
Start: 2024-07-07 | End: 2024-07-29

## 2024-07-02 NOTE — TELEPHONE ENCOUNTER
Rx sent.  Will plan to monitor tolerance/effectiveness to see if further dose adjustments in 1 month

## 2024-07-29 ENCOUNTER — TELEPHONE (OUTPATIENT)
Dept: PRIMARY CARE | Facility: CLINIC | Age: 44
End: 2024-07-29
Payer: COMMERCIAL

## 2024-07-29 DIAGNOSIS — Z13.228 SCREENING FOR METABOLIC DISORDER: ICD-10-CM

## 2024-07-29 DIAGNOSIS — Z85.89 HISTORY OF NEUROENDOCRINE CANCER: ICD-10-CM

## 2024-07-29 RX ORDER — SEMAGLUTIDE 1.7 MG/.75ML
1.7 INJECTION, SOLUTION SUBCUTANEOUS
Qty: 3 ML | Refills: 0 | Status: SHIPPED | OUTPATIENT
Start: 2024-07-29 | End: 2024-08-28

## 2024-07-29 NOTE — TELEPHONE ENCOUNTER
Prescription refill sent per request with increased dose.  With patient having been on medication now for a few months, I would like to check on her kidney liver and thyroid levels.  Orders placed to complete at her earliest convenience

## 2024-07-29 NOTE — TELEPHONE ENCOUNTER
Refill Request:  semaglutide, weight loss, (Wegovy) 1 mg/0.5 mL pen injector Inject 1 mg under the skin 1 (one) time per week for 4 doses.     PT is stating she is ok to be increased to the next dose.     Pharmacy: [facility]    Last seen : Vidal MAJOR 06-06-24

## 2024-07-29 NOTE — TELEPHONE ENCOUNTER
PT was called , left a detailed message with these instructions. Asked PT to call us back to let us know she got and understands this message.

## 2024-08-01 ENCOUNTER — LAB (OUTPATIENT)
Dept: LAB | Facility: LAB | Age: 44
End: 2024-08-01
Payer: COMMERCIAL

## 2024-08-01 DIAGNOSIS — Z85.89 HISTORY OF NEUROENDOCRINE CANCER: ICD-10-CM

## 2024-08-01 DIAGNOSIS — Z13.228 SCREENING FOR METABOLIC DISORDER: ICD-10-CM

## 2024-08-01 LAB
ALBUMIN SERPL BCP-MCNC: 4.3 G/DL (ref 3.4–5)
ALP SERPL-CCNC: 72 U/L (ref 33–110)
ALT SERPL W P-5'-P-CCNC: 13 U/L (ref 7–45)
ANION GAP SERPL CALC-SCNC: 12 MMOL/L (ref 10–20)
AST SERPL W P-5'-P-CCNC: 16 U/L (ref 9–39)
BILIRUB SERPL-MCNC: 0.4 MG/DL (ref 0–1.2)
BUN SERPL-MCNC: 10 MG/DL (ref 6–23)
CALCIUM SERPL-MCNC: 9.4 MG/DL (ref 8.6–10.3)
CHLORIDE SERPL-SCNC: 104 MMOL/L (ref 98–107)
CO2 SERPL-SCNC: 26 MMOL/L (ref 21–32)
CREAT SERPL-MCNC: 0.82 MG/DL (ref 0.5–1.05)
EGFRCR SERPLBLD CKD-EPI 2021: 89 ML/MIN/1.73M*2
GLUCOSE SERPL-MCNC: 91 MG/DL (ref 74–99)
POTASSIUM SERPL-SCNC: 4.1 MMOL/L (ref 3.5–5.3)
PROT SERPL-MCNC: 7.1 G/DL (ref 6.4–8.2)
SODIUM SERPL-SCNC: 138 MMOL/L (ref 136–145)
TSH SERPL-ACNC: 1.57 MIU/L (ref 0.44–3.98)

## 2024-08-01 PROCEDURE — 36415 COLL VENOUS BLD VENIPUNCTURE: CPT

## 2024-08-21 DIAGNOSIS — Z78.9 WEIGHT LOSS ADVISED: ICD-10-CM

## 2024-08-21 NOTE — TELEPHONE ENCOUNTER
Refill;  semaglutide, weight loss, (Wegovy) 1 mg/0.5 mL pen injector Inject 1 mg under the skin 1 (one) time per week for 4 doses.         Pharm:  [facility]

## 2024-08-22 RX ORDER — SEMAGLUTIDE 1 MG/.5ML
1 INJECTION, SOLUTION SUBCUTANEOUS
Qty: 2 ML | Refills: 0 | Status: SHIPPED | OUTPATIENT
Start: 2024-08-25 | End: 2024-09-16

## 2024-08-26 RX ORDER — SEMAGLUTIDE 1.7 MG/.75ML
1.7 INJECTION, SOLUTION SUBCUTANEOUS
Qty: 3 ML | Refills: 0 | Status: SHIPPED | OUTPATIENT
Start: 2024-08-26 | End: 2024-08-29 | Stop reason: DRUGHIGH

## 2024-08-27 ENCOUNTER — TELEPHONE (OUTPATIENT)
Dept: PRIMARY CARE | Facility: CLINIC | Age: 44
End: 2024-08-27
Payer: COMMERCIAL

## 2024-08-27 NOTE — TELEPHONE ENCOUNTER
Darline called asking what she is to be taking for the Wegovy. Pharmacy had filled for the 1 mg. She is confused as she was taking the 1.7. She had thought she was to take the next dose. Looking in the chart it seems the 1 mg and the 1.7 mg was called in. Please advise which dose she is to be on.

## 2024-08-29 RX ORDER — SEMAGLUTIDE 1.7 MG/.75ML
1.7 INJECTION, SOLUTION SUBCUTANEOUS
Qty: 3 ML | Refills: 0 | Status: SHIPPED | OUTPATIENT
Start: 2024-08-29 | End: 2024-09-28

## 2024-08-29 NOTE — TELEPHONE ENCOUNTER
Brooke is calling again to see what dose of the Wegovy she needs to take, pharmacy has both and she hasn't picked either up yet.

## 2024-08-29 NOTE — TELEPHONE ENCOUNTER
Initially received refill from pharmacy to which I sent 1 mg dose the patient responded shortly after stating that she had interest in increasing.  Will plan to increase to 1.7 mg weekly dose.  Will resend prescription to pharmacy with hopes of clarifying this confusion

## 2024-09-18 ENCOUNTER — TELEPHONE (OUTPATIENT)
Dept: PRIMARY CARE | Facility: CLINIC | Age: 44
End: 2024-09-18
Payer: COMMERCIAL

## 2024-09-18 DIAGNOSIS — Z78.9 WEIGHT LOSS ADVISED: ICD-10-CM

## 2024-09-18 RX ORDER — SEMAGLUTIDE 2.4 MG/.75ML
2.4 INJECTION, SOLUTION SUBCUTANEOUS
Qty: 3 ML | Refills: 2 | Status: SHIPPED | OUTPATIENT
Start: 2024-09-18 | End: 2024-12-17

## 2024-09-18 NOTE — TELEPHONE ENCOUNTER
Prescription refill sent with plans to increase to next dose.  As it has been 3 months since we have last seen the patient, would encourage her to schedule an in person appointment so we can monitor her progress and talk about ongoing use of the medication.  This can be set up at her earliest convenience.  As for her left over lower doses, I am not sure how long they will last for.  If she sees an expiration date on the medication that seems to be of a significant duration, she can keep the medicine in case we get to a point of titrating down.  Otherwise she can talk to her pharmacy to see if there is a recommendation of what to do with these previous doses

## 2024-09-18 NOTE — TELEPHONE ENCOUNTER
Refill Request:    semaglutide, weight loss, (Wegovy) 1.7 mg/0.75 mL pen injector Inject 1.7 mg under the skin every 7 days.     Patient is stating she has been on this dose now for 2 months. Patient is asking if she can be prescribed the upper dose. PT is also stating she has 4 shots of the 1 mg dose in her refrigerator asking what she should do with them? She is also asking when she needs her next med check appointment and if it can be virtual.      Pharmacy: [facility]    Last OV- 06-06-24 Med Check    Next OV- to be determined.

## 2024-09-18 NOTE — TELEPHONE ENCOUNTER
Brooke was called and advised. Danii. Message.  She is scheduled on 10-08-24 for a medication check. She is aware and understands and has no questions at this time.

## 2024-09-23 ENCOUNTER — TELEPHONE (OUTPATIENT)
Dept: HEMATOLOGY/ONCOLOGY | Facility: HOSPITAL | Age: 44
End: 2024-09-23

## 2024-09-28 ENCOUNTER — HOSPITAL ENCOUNTER (OUTPATIENT)
Dept: RADIOLOGY | Facility: HOSPITAL | Age: 44
End: 2024-09-28
Payer: COMMERCIAL

## 2024-09-30 ENCOUNTER — HOSPITAL ENCOUNTER (OUTPATIENT)
Dept: RADIOLOGY | Facility: HOSPITAL | Age: 44
Discharge: HOME | End: 2024-09-30
Payer: COMMERCIAL

## 2024-09-30 DIAGNOSIS — C7A.8 NEUROENDOCRINE CANCER: ICD-10-CM

## 2024-09-30 PROCEDURE — 74183 MRI ABD W/O CNTR FLWD CNTR: CPT | Performed by: RADIOLOGY

## 2024-09-30 PROCEDURE — 74183 MRI ABD W/O CNTR FLWD CNTR: CPT

## 2024-09-30 PROCEDURE — 2550000001 HC RX 255 CONTRASTS: Performed by: INTERNAL MEDICINE

## 2024-09-30 PROCEDURE — A9575 INJ GADOTERATE MEGLUMI 0.1ML: HCPCS | Performed by: INTERNAL MEDICINE

## 2024-09-30 RX ORDER — GADOTERATE MEGLUMINE 376.9 MG/ML
20 INJECTION INTRAVENOUS
Status: COMPLETED | OUTPATIENT
Start: 2024-09-30 | End: 2024-09-30

## 2024-10-02 ENCOUNTER — OFFICE VISIT (OUTPATIENT)
Dept: HEMATOLOGY/ONCOLOGY | Facility: HOSPITAL | Age: 44
End: 2024-10-02
Payer: COMMERCIAL

## 2024-10-02 ENCOUNTER — LAB (OUTPATIENT)
Dept: LAB | Facility: HOSPITAL | Age: 44
End: 2024-10-02
Payer: COMMERCIAL

## 2024-10-02 VITALS
HEART RATE: 73 BPM | BODY MASS INDEX: 32.27 KG/M2 | WEIGHT: 199.96 LBS | RESPIRATION RATE: 16 BRPM | DIASTOLIC BLOOD PRESSURE: 63 MMHG | OXYGEN SATURATION: 100 % | SYSTOLIC BLOOD PRESSURE: 119 MMHG | TEMPERATURE: 97.5 F

## 2024-10-02 DIAGNOSIS — C7A.8 NEUROENDOCRINE CANCER: ICD-10-CM

## 2024-10-02 LAB
ALBUMIN SERPL BCP-MCNC: 4.3 G/DL (ref 3.4–5)
ALP SERPL-CCNC: 69 U/L (ref 33–110)
ALT SERPL W P-5'-P-CCNC: 9 U/L (ref 7–45)
ANION GAP SERPL CALC-SCNC: 12 MMOL/L (ref 10–20)
APPEARANCE UR: CLEAR
AST SERPL W P-5'-P-CCNC: 13 U/L (ref 9–39)
BACTERIA #/AREA URNS AUTO: ABNORMAL /HPF
BILIRUB SERPL-MCNC: 0.6 MG/DL (ref 0–1.2)
BILIRUB UR STRIP.AUTO-MCNC: NEGATIVE MG/DL
BUN SERPL-MCNC: 10 MG/DL (ref 6–23)
CALCIUM SERPL-MCNC: 9.5 MG/DL (ref 8.6–10.3)
CHLORIDE SERPL-SCNC: 105 MMOL/L (ref 98–107)
CO2 SERPL-SCNC: 27 MMOL/L (ref 21–32)
COLOR UR: YELLOW
CREAT SERPL-MCNC: 0.79 MG/DL (ref 0.5–1.05)
EGFRCR SERPLBLD CKD-EPI 2021: >90 ML/MIN/1.73M*2
EST. AVERAGE GLUCOSE BLD GHB EST-MCNC: 100 MG/DL
GLUCOSE SERPL-MCNC: 79 MG/DL (ref 74–99)
GLUCOSE UR STRIP.AUTO-MCNC: NORMAL MG/DL
HBA1C MFR BLD: 5.1 %
KETONES UR STRIP.AUTO-MCNC: NEGATIVE MG/DL
LEUKOCYTE ESTERASE UR QL STRIP.AUTO: ABNORMAL
MUCOUS THREADS #/AREA URNS AUTO: ABNORMAL /LPF
NITRITE UR QL STRIP.AUTO: NEGATIVE
PH UR STRIP.AUTO: 6 [PH]
POTASSIUM SERPL-SCNC: 4 MMOL/L (ref 3.5–5.3)
PROT SERPL-MCNC: 7 G/DL (ref 6.4–8.2)
PROT UR STRIP.AUTO-MCNC: NEGATIVE MG/DL
RBC # UR STRIP.AUTO: ABNORMAL /UL
RBC #/AREA URNS AUTO: ABNORMAL /HPF
SODIUM SERPL-SCNC: 140 MMOL/L (ref 136–145)
SP GR UR STRIP.AUTO: 1.02
SQUAMOUS #/AREA URNS AUTO: ABNORMAL /HPF
UROBILINOGEN UR STRIP.AUTO-MCNC: NORMAL MG/DL
WBC #/AREA URNS AUTO: ABNORMAL /HPF

## 2024-10-02 PROCEDURE — 99215 OFFICE O/P EST HI 40 MIN: CPT | Performed by: INTERNAL MEDICINE

## 2024-10-02 PROCEDURE — 36415 COLL VENOUS BLD VENIPUNCTURE: CPT

## 2024-10-02 PROCEDURE — 87086 URINE CULTURE/COLONY COUNT: CPT | Performed by: INTERNAL MEDICINE

## 2024-10-02 PROCEDURE — 84075 ASSAY ALKALINE PHOSPHATASE: CPT

## 2024-10-02 PROCEDURE — 81001 URINALYSIS AUTO W/SCOPE: CPT | Performed by: INTERNAL MEDICINE

## 2024-10-02 PROCEDURE — 83036 HEMOGLOBIN GLYCOSYLATED A1C: CPT

## 2024-10-02 ASSESSMENT — PAIN SCALES - GENERAL: PAINLEVEL: 0-NO PAIN

## 2024-10-02 NOTE — PROGRESS NOTES
Patient ID: Brooke Chu is a 44 y.o. female.  Visit type: Follow up telephone visit    A telephone visit (audio only) between the patient (at the originating site) and the provider (at the distant site) was utilized to provide this telehealth service.   Verbal consent was requested and obtained from Brooke Chu on this date, 10/02/24 for a telehealth visit.       Current Therapy  Treatment Plan:  Lanreotide, Every 28 Days      ONCOLOGIC HISTORY    44-year-old woman with a several year history of neck pain on the Rt side and for exploration they did full imaging include CT neck, chest and AP on 06/30/2020  which showed a large lobulated heterogeneously enhancing mass lesion involving segments 7 and 8 of the right hepatic lobe measuring approximately 13.2 x 10.5 x 9.7 cm in size. 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   MRI showed large hypervascular heterogeneously enhancing hepatic mass lesion in segment 7 and 8 and multiple other satellite hepatic lesions involving right and left hepatic lobes. Stable subcentimeter zayda hepatis lymph nodes. No other significant lymphadenopathy  or free intraperitoneal fluid.  PET-NET showed Dominant centrally necrotic somatostatin receptor expressing mass within hepatic segments 7 and 8. Additional satellite lesions in the bilateral hepatic lobes as described above. Somatostatin avid Left internal iliac lymph nodes. No additional  sites of abnormal Ga-68 dotatate uptake suggestive of malignancy identified.  PET-FDG didn't show any extra glucose uptake  Colonoscopy and SB enteroscopy didn't show any primary lesion. Symptoms of diarrhea and intermittent flushing s/p 1 dose of lanreotide after which she had severe bloating and abdominal pain as well as diarrhea   10/27: She had partial hepatectomy (segment 3, 5, 6), liver ablation  (segment 5, 3), cholecystectomy   She recovered well with surgery. She was tested positive for COVID  and only symptoms she has was loss of taste and stuffy nose.   She started on lanreotide but d/t multiple side effects including gas, bloating, and some hypoglycemia episodes she was switched to Octreotide 30 mg in December of 2021  MRI abd/pelvis 1/7/2021 appears stable but limited imaging d/t inability to do with contrast as IV infiltrated during scan per patient.     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 No abdominal pain, no nausea, or vomiting.    ROS  All 14 systems have been reviewed and were negative except for the HPI.      OBJECTIVE:    VS:  /63 (BP Location: Left arm, Patient Position: Sitting, BP Cuff Size: Adult)   Pulse 73   Temp 36.4 °C (97.5 °F) (Temporal)   Resp 16   Wt 90.7 kg (199 lb 15.3 oz)   SpO2 100%   BMI 32.27 kg/m²   Weight:   Vitals:    10/02/24 1411   Weight: 90.7 kg (199 lb 15.3 oz)       BSA: 2.06 meters squared      General: Appears well and in NAD  Eyes: PERRL, EOMI, clear sclera  ENMT: mucous membranes moist, no apparent injury, no lesions seen  Head/Neck: Neck supple, no apparent injury, thyroid without mass or tenderness, No JVD, trachea midline,   Thorax: Patent airways, CTAB, normal breath sounds with good chest expansion, thorax symmetric  Cardiovascular: Regular, rate and rhythm, no murmurs, 2+ equal pulses of the extremities, normal S 1and S 2  Gastrointestinal: Nondistended, soft, non-tender, no rebound tenderness or guarding, no masses palpable, no organomegaly, +BS  Musculoskeletal: ROM intact, no joint swelling, normal strength  Extremities: normal extremities, no cyanosis edema, contusions or wounds, no clubbing  Neurological: alert and oriented x3, intact senses, motor, response and reflexes, normal strength  Lymphatic: No  significant lymphadenopathy  Psychological: Appropriate mood and behavior  Skin: Warm and dry, no lesions, no rashes      Diagnostic Results     Comprehensive Metabolic Panel              Component  Ref Range & Units 2 wk ago  (1/24/24) 4 mo ago  (10/6/23) 7 mo ago  (7/12/23) 10 mo ago  (3/29/23) 1 yr ago  (10/24/22) 1 yr ago  (8/27/22) 1 yr ago  (7/20/22)   Glucose  74 - 99 mg/dL 90 112 High  94 121 High  103 High  97 98   Sodium  136 - 145 mmol/L 140 137 143 140 139 137 136   Potassium  3.5 - 5.3 mmol/L 4.8 4.1 4.1 4.3 3.8 4.1 4.0   Chloride  98 - 107 mmol/L 106 105 108 High  105 108 High  107 103   Bicarbonate  21 - 32 mmol/L 25 25 28 28 23 21 26   Anion Gap  10 - 20 mmol/L 14 11 11 11 12 13 11   Urea Nitrogen  6 - 23 mg/dL 11 15 13 9 10 12 11   Creatinine  0.50 - 1.05 mg/dL 0.73 0.77 0.81 0.63 0.63 0.61 0.74   eGFR  >60 mL/min/1.73m*2 >90 >90 CM        Comment: Calculations of estimated GFR are performed using the 2021 CKD-EPI Study Refit equation without the race variable for the IDMS-Traceable creatinine methods.  https://jasn.asnjournals.org/content/early/2021/09/22/ASN.0194074091   Calcium  8.6 - 10.3 mg/dL 8.9 9.1 9.4 9.4 R 8.7 8.5 Low  8.7   Albumin  3.4 - 5.0 g/dL 4.1 4.1 4.2 4.2 4.0  4.1   Alkaline Phosphatase  33 - 110 U/L 80 73 70 68 63  65   Total Protein  6.4 - 8.2 g/dL 6.5 7.3 7.1 7.0 6.8  7.1   AST  9 - 39 U/L 16 19 14 13 13  20   Bilirubin, Total  0.0 - 1.2 mg/dL 0.4 0.4 0.3 0.4 0.5  0.4   ALT  7 - 45 U/L 15 19 CM 11 CM            Contains abnormal data CBC and Auto Differential                Component  Ref Range & Units 2 wk ago  (1/24/24) 4 mo ago  (10/6/23) 7 mo ago  (7/12/23) 10 mo ago  (3/29/23) 1 yr ago  (10/24/22) 1 yr ago  (7/20/22) 1 yr ago  (7/8/22)   WBC  4.4 - 11.3 x10*3/uL 11.8 High  7.5 9.5 R 7.9 R 7.4 R 6.8 R 9.0 R   nRBC  0.0 - 0.0 /100 WBCs 0.0 0.0        RBC  4.00 - 5.20 x10*6/uL 4.95 4.80 4.73 R 4.79 R 4.55 R 4.54 R 4.71 R   Hemoglobin  12.0 - 16.0 g/dL 14.7 14.6 14.2 14.1 13.6  13.4 14.0   Hematocrit  36.0 - 46.0 % 45.4 44.1 42.5 42.4 41.6 42.1 43.1   MCV  80 - 100 fL 92 92 90 89 91 93 92   MCH  26.0 - 34.0 pg 29.7 30.4        MCHC  32.0 - 36.0 g/dL 32.4 33.1 33.4 33.3 32.7 31.8 Low  32.5   RDW  11.5 - 14.5 % 13.0 12.9 13.5 13.5 13.3 13.2 12.9   Platelets  150 - 450 x10*3/uL 244 229 205 R 217 R 183 R 191 R 220 R   Neutrophils %  40.0 - 80.0 % 67.3 55.6 63.3 55.6 56.8 54.0 64.2   Immature Granulocytes %, Automated  0.0 - 0.9 % 0.4 0.3 CM 0.5 CM 0.4 CM 0.1 CM 0.6 CM 0.2 CM   Comment: Immature Granulocyte Count (IG) includes promyelocytes, myelocytes and metamyelocytes but does not include bands. Percent differential counts (%) should be interpreted in the context of the absolute cell counts (cells/UL).   Lymphocytes %  13.0 - 44.0 % 23.3 33.8 26.2 33.2 31.8 34.2 25.6 CM   Monocytes %  2.0 - 10.0 % 6.9 7.5 7.2 7.9 8.4 7.9 7.6   Eosinophils %  0.0 - 6.0 % 1.6 2.0 2.3 2.3 2.4 2.3 2.0   Basophils %  0.0 - 2.0 % 0.5 0.8 0.5 0.6 0.5 1.0 0.4   Neutrophils Absolute  1.20 - 7.70 x10*3/uL 7.90 High  4.16 CM 6.02 R 4.41 R 4.20 R 3.67 R 5.78 R   Comment: Percent differential counts (%) should be interpreted in the context of the absolute cell counts (cells/uL).   Immature Granulocytes Absolute, Automated  0.00 - 0.70 x10*3/uL 0.05 0.02        Lymphocytes Absolute  1.20 - 4.80 x10*3/uL 2.74 2.53 2.49 R 2.64 R 2.35 R 2.33 R 2.30 R   Monocytes Absolute  0.10 - 1.00 x10*3/uL 0.81 0.56 0.69 R 0.63 R 0.62 R 0.54 R 0.68 R   Eosinophils Absolute  0.00 - 0.70 x10*3/uL 0.19 0.15 0.22 R 0.18 R 0.18 R 0.16 R 0.18 R   Basophils Absolute  0.00 - 0.10 x10*3/uL 0.06 0.06 0.05 R 0.05 R 0.04 R 0.07 R 0.04 R   MPV  11.1 R             5-Hydroxyindoleacetic Acid,Plasma          Component  Ref Range & Units 2 wk ago 4 mo ago 10 mo ago   5-Hydroxyindoleacetic Acid (5-HIAA),Plasma  ng/mL 5.7 6.2 CM 3.3 CM          MR abdomen w and wo IV contrast  Narrative: Interpreted By:  Jaya Hernandez,   STUDY:  MR ABDOMEN W AND WO IV  CONTRAST;  9/30/2024 4:05 pm      INDICATION:  Signs/Symptoms:NET restaging.      ,C7A.8 Other malignant neuroendocrine tumors      COMPARISON:  06/10/2024.      ACCESSION NUMBER(S):  [id number]      ORDERING CLINICIAN:  LIGIA MOON      TECHNIQUE:  MRI LIVER; Multiplanar magnetic resonance images of the abdomen were  obtained including the following sequences; T2-weighted SSFSE with  and without fat saturation, T1-weighted GRE in/opposed phase, DWI,  fat saturated 3D-T1w GRE pre and dynamically post contrast. 20 mL of  Dotarem was administered intravenously without immediate complication.      FINDINGS:  LIVER:  No signal changes of steatosis.. Partial right hepatectomy changes  are present.  7 mm arterial enhancing observation in segment VIII is stable on  series 14, image 16/88. As before there is no associated restricted  diffusion. There is a nonenhancing ablation zone near the junction of  segments V and VIII.      There are a few new tiny arterial enhancing observations without  contrast washout or restricted diffusion, for example: Segment VIII:  7 mm series 14, image 16/88 Segment II/III: 6 mm and 5 mm  observations image 35/88.      BILE DUCTS:  No dilatation.      GALLBLADDER:  Absent.      PANCREAS:  Unremarkable. No ductal dilatation. No pancreas divisum.      SPLEEN:  Unremarkable.      ADRENAL GLANDS:  Unremarkable.      KIDNEYS:  A few small left renal cysts. Otherwise unremarkable kidneys without  hydronephrosis.      LYMPH NODES:  No lymphadenopathy identified.      ABDOMINAL VESSELS:  Abdominal aorta is patent without aneurysm. The major visceral  arterial branches are patent. Major portal venous branches are  patent. IVC and visualized major branches are patent.      BOWEL:  No dilated bowel is visualized.      PERITONEUM/RETROPERITONEUM:  No visualized free fluid.      BONES AND LOWER THORAX:  Mild lumbar degenerative changes slight scoliosis. Grossly clear lung  bases.          Impression: 1.   The previous arterial enhancing observation without restricted  diffusion in hepatic segment VIII is not significantly changed. Three  additional subcentimeter arterial enhancing observations in the liver  without restricted diffusion appear new since prior exam. Previous  ablation site otherwise appears unchanged.  2. No new sites of extrahepatic metastatic disease in the abdomen  otherwise identified.          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 The possibility of origin in pancreas and duodenum should be excluded.   MRI showed large hypervascular heterogeneously enhancing hepatic mass lesion in segment 7 and 8 and multiple other satellite hepatic lesions involving right and left hepatic lobes. Stable subcentimeter zayda hepatis lymph nodes. No other significant lymphadenopathy  or free intraperitoneal fluid.  PET-NET showed Dominant centrally necrotic somatostatin receptor expressing mass within hepatic segments 7 and 8. Additional satellite lesions in the bilateral hepatic lobes as described above. Somatostatin avid Left internal iliac lymph nodes. No additional  sites of abnormal Ga-68 dotatate uptake suggestive of malignancy identified.  PET-FDG didnt show any extra glucose uptake  Colonoscopy and SB enteroscopy didn't show any primary lesion  10/27/2020: She had  "partial hepatectomy (segment 3, 5, 6), liver ablation (segment 5, 3), cholecystectomy and pth confirmed G2 well diff NET (kI67 3.1%)    She recovered well with surgery. She was tested positive for COVID  and only symptoms she has was loss of taste and stuffy nose.   04/26/2021: Restaging CT scan showed post surgical changes and postablation changes in the liver with no new lesions  MRI showed At least 4 foci of restricted diffusion are identified in segments 8 and 3 without correlating abnormality on other sequences. However 2 of these foci correlate with hypervascular foci and a prior CT scan.  Findings are nonspecific, however  metastatic foci cannot be excluded  MRI showed stable disease on 08/16/2021  PET-Ga68 on 11/2021 showed multiple small (3-4) somatostatin avid foci are noted scattered throughout the hepatic  parenchyma which appear stable number and relative intensity compared to the prior exam   Octreotide 30 mg started 12/2021 as she was not tolerating lanreotide well with multiple side effects.  MRI Brain 12/13 done for recurrent headaches/ to rule out mets- Scan WNL  MRI abd/pelvis 1/7/2021 appears stable but limited imaging d/t inability to do with contrast as IV infiltrated during scan per patient.   Repeated MRI on 03/2022: Showed stable disease with one new liver lesion   Repeated MRI on 07/23/2022: Showed stable disease   PET scan on 10/14/2022 shows stable disease  MRI AP on 3/6/2023 shows stable disease. 5HIAA 3.3 on 3/29/23  MRI AP on 7/3/2023 shows stable disease  MRI AP on 10/14/23 shows: \"Status post partial right hepatectomy. Stable indeterminate hepatic arterial enhancing foci as detailed above. No new suspicious lesions.\"  MRI AP on 1/2/24 shows: Few small subcentimeter arterially enhancing foci without signal abnormality on other postcontrast sequences or anatomic sequences  Patient presented to TB with recommendations: \"Liver foci are very small and not sure if these are new NETs. MRI w " "eovist to compare with MRI on 10/2023 and PET-CU64. Resume SSA and if PET-CU64 only showed liver lesions will proceed with surgery vs ablation\"  PET-CU64 showed stable multiple somatostatin avid lesions are scattered throughout the liver  MRI w eovist showed enhancing hepatic segment 8 lesion and stable arterial enhancing hepatic segment 5 lesion  She had MWA on 04/22/2024  Restaging MRI on 06/2024: Showed New ablation sites involving the liver. One of the previous suspicious diffusion restricting hepatic lesions is no longer identified. A subcentimeter arterial enhancing nodule in segment VIII appears similar to prior exam  We held SSA due to abdominal pain, bloating and fatigue   09/30/2024: Restaging MRI showed previous arterial enhancing observation without restricted diffusion in hepatic segment VIII is not significantly changed. Three  additional subcentimeter arterial enhancing observations in the liver  without restricted diffusion appear new since prior exam.  She has more urinary frequency. She has mild burning sensation.    Plan:    -PET-Ga68  -Review at the next NET TB   -RTC after scans for follow up      Sotero Arango M.D.   and Director of the Neuroendocrine Tumor Program  Gastrointestinal Medical Oncology  Department of Hematology and Oncology  [facility], [place] [address]  Phone (Office): [phone]  Fax:  [phone]   Email: [email or web address]  Learn more about [facility] Comprehensive Neuroendocrine Tumor Program: Click Here  Learn more about [place] [employer]: [email or web address]            "

## 2024-10-03 ENCOUNTER — TELEPHONE (OUTPATIENT)
Dept: HEMATOLOGY/ONCOLOGY | Facility: HOSPITAL | Age: 44
End: 2024-10-03

## 2024-10-03 DIAGNOSIS — N30.00 ACUTE CYSTITIS WITHOUT HEMATURIA: Primary | ICD-10-CM

## 2024-10-03 LAB
BACTERIA UR CULT: NORMAL
HOLD SPECIMEN: NORMAL

## 2024-10-03 RX ORDER — SULFAMETHOXAZOLE AND TRIMETHOPRIM 800; 160 MG/1; MG/1
1 TABLET ORAL 2 TIMES DAILY
Qty: 6 TABLET | Refills: 0 | Status: SHIPPED | OUTPATIENT
Start: 2024-10-03 | End: 2024-10-08 | Stop reason: ALTCHOICE

## 2024-10-07 ENCOUNTER — HOSPITAL ENCOUNTER (OUTPATIENT)
Dept: RADIOLOGY | Facility: HOSPITAL | Age: 44
Discharge: HOME | End: 2024-10-07
Payer: COMMERCIAL

## 2024-10-07 DIAGNOSIS — C7A.8 NEUROENDOCRINE CANCER: ICD-10-CM

## 2024-10-07 PROCEDURE — 78815 PET IMAGE W/CT SKULL-THIGH: CPT | Performed by: STUDENT IN AN ORGANIZED HEALTH CARE EDUCATION/TRAINING PROGRAM

## 2024-10-07 PROCEDURE — A9587 GALLIUM GA-68: HCPCS | Performed by: INTERNAL MEDICINE

## 2024-10-07 PROCEDURE — 3430000001 HC RX 343 DIAGNOSTIC RADIOPHARMACEUTICALS: Performed by: INTERNAL MEDICINE

## 2024-10-07 PROCEDURE — 78815 PET IMAGE W/CT SKULL-THIGH: CPT

## 2024-10-08 ENCOUNTER — OFFICE VISIT (OUTPATIENT)
Dept: PRIMARY CARE | Facility: CLINIC | Age: 44
End: 2024-10-08
Payer: COMMERCIAL

## 2024-10-08 VITALS
DIASTOLIC BLOOD PRESSURE: 72 MMHG | BODY MASS INDEX: 31.66 KG/M2 | SYSTOLIC BLOOD PRESSURE: 122 MMHG | HEIGHT: 66 IN | OXYGEN SATURATION: 99 % | WEIGHT: 197 LBS | HEART RATE: 79 BPM | TEMPERATURE: 97.8 F

## 2024-10-08 DIAGNOSIS — E66.811 OBESITY (BMI 30.0-34.9): Primary | ICD-10-CM

## 2024-10-08 DIAGNOSIS — Z78.9 WEIGHT LOSS ADVISED: ICD-10-CM

## 2024-10-08 DIAGNOSIS — C7B.8 METASTATIC MALIGNANT NEUROENDOCRINE TUMOR TO LIVER: ICD-10-CM

## 2024-10-08 PROCEDURE — 99213 OFFICE O/P EST LOW 20 MIN: CPT | Performed by: FAMILY MEDICINE

## 2024-10-08 PROCEDURE — 1036F TOBACCO NON-USER: CPT | Performed by: FAMILY MEDICINE

## 2024-10-08 PROCEDURE — 3008F BODY MASS INDEX DOCD: CPT | Performed by: FAMILY MEDICINE

## 2024-10-08 ASSESSMENT — PAIN SCALES - GENERAL: PAINLEVEL: 0-NO PAIN

## 2024-10-08 NOTE — PATIENT INSTRUCTIONS
Problem List Items Addressed This Visit             ICD-10-CM    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 C7B.8     - Continue to schedule oncology follow-up to review recent scan findings and determine plan of action with possible repeat ablations  -Will additionally plan to review medications and see if there is any inherent issue with ongoing use of GLP-1         Obesity (BMI 30.0-34.9) - Primary E66.811     - Congrats on the weight loss you have achieved so far  -Will monitor tolerance of current dose with plans to adjust if ongoing headaches/fatigue  -Discussed benefits of starting B12 supplement  -Will plan to monitor routine blood work going forward though deferred at this time to see if any workups are ultimately carried out by oncology at upcoming appointment          Other Visit Diagnoses         Codes    Weight loss advised     Z78.9            Counseling:       Medication education:         Education:  The patient is counseled regarding potential side-effects of all new medications        Understanding:  Patient expressed understanding        Adherence:  No barriers to adherence identified    ** Please excuse any errors in grammar or translation related to this dictation. Voice recognition software was utilized to prepare this document. **

## 2024-10-08 NOTE — ASSESSMENT & PLAN NOTE
- Continue to schedule oncology follow-up to review recent scan findings and determine plan of action with possible repeat ablations  -Will additionally plan to review medications and see if there is any inherent issue with ongoing use of GLP-1

## 2024-10-08 NOTE — PROGRESS NOTES
Outpatient Visit Note    Chief Complaint   Patient presents with    Follow-up     Med f/u         HPI:  Brooke Chu is a 44 y.o. female with PMH significant for unspecified neuroendocrine cancer followed by Oncology at  who presents to the office for medication follow-up.  She was last seen in the office on 6/6/2024 via telemedicine encounter secondary to medication follow-up.    At encounter in May she reported difficulty with losing weight.  Stated to have had significant weight loss after having children.  Had attempted dietary modification for weight loss was difficult to accomplish particularly with her neuroendocrine cancer.  Expressed have interest in trial of weekly injectable Wegovy.  Stated to have contacted her insurance which seem to indicate that this would be covered to which initial Wegovy prescription was sent.    At follow-up she reported compliance with Wegovy denying any adverse side effects of GI upset.  Had noted changes in her appetite though denies any significant weight loss.  Denied having scale to personally monitor her weight though she had not appreciated any physical changes.  Dose was ultimately adjusted.  Patient has continued to communicate with dose gradually increased to current 2.4 mg weekly.  She reports increased fatigue and more frequent headaches since increasing dose.  She is about to take her second dose in is wanting to see if this will be better tolerated.  Has lost 20+ pounds since starting regimen.  States that this has been generally well-tolerated up to this point.  Denies any significant nausea or vomiting.    Level check completed on 8/1/2024 which included CMP and TSH which was unremarkable.  Patient had a recent panel completed with oncology team on 10/2/2024 including CMP, A1c urinalysis and urine culture.  Urinalysis did show potential UTI but urine culture was ultimately negative.    Patient had repeat PET/CT completed following MRI abdomen on  9/30/2024 which showed new enhancing hepatic lesions.  Imaging did show likely residual viable disease though no metastatic evidence outside of liver.  She is currently scheduled for oncology follow-up later this month    Current Medications  Current Outpatient Medications   Medication Instructions    fexofenadine (ALLEGRA) 60 mg, oral, Daily    furosemide (LASIX) 20 mg, oral, As needed    ibuprofen 200 mg tablet 1 tablet, oral, 3 times daily PRN, with food or milk    ondansetron (ZOFRAN) 4 mg, oral, Every 8 hours PRN, Take 2 tablets as needed    Wegovy 2.4 mg, subcutaneous, Every 7 days        Allergies  Allergies   Allergen Reactions    Corticosteroids (Glucocorticoids) Unknown    Methylprednisolone Other and Unknown     Abdominal pain    Other Unknown     steroids        Past Medical History:   Diagnosis Date    Chronic headaches     Dizziness     Fibroids     Fibroids     Hypoglycemia     Liver mass     Neuroendocrine cancer     Other bursal cyst, unspecified wrist     Synovial cyst of wrist    Ovarian cyst     Ovarian cyst     Streptococcus pharyngitis 03/19/2024      Past Surgical History:   Procedure Laterality Date    CERVICAL FUSION      CHOLECYSTECTOMY      COLONOSCOPY      CT GUIDED PERCUTANEOUS PERITONEAL OR RETROPERITONEAL FLUID COLLECTION DRAINAGE  11/03/2020    CT GUIDED PERCUTANEOUS PERITONEAL OR RETROPERITONEAL FLUID COLLECTION DRAINAGE [place] [facility] [place]    OTHER SURGICAL HISTORY  08/24/2022    Neck surgery    OTHER SURGICAL HISTORY  11/14/2020    Hepatectomy partial    OTHER SURGICAL HISTORY  11/14/2020    Liver tumor ablation    OTHER SURGICAL HISTORY      Liver biospy    SMALL BOWEL ENTEROSCOPY      TUBAL LIGATION      US GUIDED NEEDLE LIVER BIOPSY  07/23/2020    US GUIDED NEEDLE LIVER BIOPSY [place]23/2020 [facility] [place] [facility]    WRIST SURGERY       Family History   Problem Relation Name Age of Onset    Other (adult respiratory distress sydrome) Mother      Other (cardiac disorder) Mother       COPD Mother      Crohn's disease Mother      Hypothyroidism Mother      Hyperthyroidism Mother      Heart attack Mother      Cerebral aneurysm Father      Hyperthyroidism Other Grandmother     Hypothyroidism Other Grandmother     Diabetes Other Grandfather     Diabetes Other Aunt     Hyperthyroidism Other Aunt     Hypothyroidism Other Aunt     Diabetes Paternal Great-Grandmother       Social History     Tobacco Use    Smoking status: Former     Types: Cigarettes     Passive exposure: Never    Smokeless tobacco: Never   Vaping Use    Vaping status: Never Used   Substance Use Topics    Alcohol use: Never    Drug use: Never       ROS  All pertinent positive symptoms are included in the history of present illness.  All other systems have been reviewed and are negative and noncontributory to this patient's current ailments.      VITAL SIGNS  Vitals:    10/08/24 1530   BP: 122/72   Pulse: 79   Temp: 36.6 °C (97.8 °F)   SpO2: 99%       PHYSICAL EXAM  GENERAL APPEARANCE: alert and oriented, Pleasant and cooperative, No Acute Distress  HEENT: EOMI, PERRLA, MMM  HEART: RRR, normal S1S2, no murmurs, click or rubs  LUNGS: clear to auscultation bilaterally, no wheezes/rhonchi/rales  EXTREMITIES: no edema, normal ROM  SKIN: normal, no rash, unremarkable  NEUROLOGIC EXAM: non-focal exam  MUSCULOSKELETAL: no gross abnormalities  PSYCH: affect is normal, eye contact is good    Assessment/Plan   Problem List Items Addressed This Visit             ICD-10-CM    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 C7B.8     - Continue to schedule oncology follow-up to review recent scan findings and determine plan of action with possible repeat ablations  -Will additionally plan to review medications and see if there is any inherent issue with ongoing use of GLP-1         Obesity (BMI 30.0-34.9) - Primary E66.811     - Congrats on the weight loss you have achieved so far  -Will monitor tolerance of current dose with plans to adjust if  ongoing headaches/fatigue  -Discussed benefits of starting B12 supplement  -Will plan to monitor routine blood work going forward though deferred at this time to see if any workups are ultimately carried out by oncology at upcoming appointment          Other Visit Diagnoses         Codes    Weight loss advised     Z78.9            Counseling:       Medication education:         Education:  The patient is counseled regarding potential side-effects of all new medications        Understanding:  Patient expressed understanding        Adherence:  No barriers to adherence identified    ** Please excuse any errors in grammar or translation related to this dictation. Voice recognition software was utilized to prepare this document. **

## 2024-10-08 NOTE — ASSESSMENT & PLAN NOTE
- Congrats on the weight loss you have achieved so far  -Will monitor tolerance of current dose with plans to adjust if ongoing headaches/fatigue  -Discussed benefits of starting B12 supplement  -Will plan to monitor routine blood work going forward though deferred at this time to see if any workups are ultimately carried out by oncology at upcoming appointment

## 2024-10-18 ENCOUNTER — TELEPHONE (OUTPATIENT)
Dept: PRIMARY CARE | Facility: CLINIC | Age: 44
End: 2024-10-18
Payer: COMMERCIAL

## 2024-10-18 DIAGNOSIS — Z78.9 WEIGHT LOSS ADVISED: ICD-10-CM

## 2024-10-18 DIAGNOSIS — E66.811 OBESITY (BMI 30.0-34.9): Primary | ICD-10-CM

## 2024-10-18 RX ORDER — SEMAGLUTIDE 1.7 MG/.75ML
1.7 INJECTION, SOLUTION SUBCUTANEOUS WEEKLY
Qty: 3 ML | Refills: 2 | Status: SHIPPED | OUTPATIENT
Start: 2024-10-18 | End: 2025-01-16

## 2024-10-18 NOTE — TELEPHONE ENCOUNTER
Brooke is calling stating she just started on the dose of medication:    semaglutide, weight loss, (Wegovy) 2.4 mg/0.75 mL pen injector Inject 2.4 mg under the skin every 7 days.     She is stating she has taken this dose for 3 weeks (2.4 mg ) now and she is having headaches and fatigue. She is stating she feels this dose is too high for her at this time. She is asking if there is a dose between the 1.7 mg and 2.4 mg she can try. If there is not a dose in between she can take, she is stating she would like to go back down to her previous dose of 1.7mg. Please call to advise. Brooke's # [phone]    Pharmacy:[facility] [place]

## 2024-10-18 NOTE — TELEPHONE ENCOUNTER
While Ozempic does have an in between dose unfortunately there is no dose between 1.7 and 2.5 for Wegovy.  My recommendation would be going back to the previous 1.7 mg dose weekly to which I have sent prescriptions to pharmacy.  Will monitor tolerance to adjusting back to previous regimen

## 2024-10-22 ENCOUNTER — APPOINTMENT (OUTPATIENT)
Dept: HEMATOLOGY/ONCOLOGY | Facility: HOSPITAL | Age: 44
End: 2024-10-22
Payer: COMMERCIAL

## 2024-10-23 ENCOUNTER — OFFICE VISIT (OUTPATIENT)
Dept: HEMATOLOGY/ONCOLOGY | Facility: HOSPITAL | Age: 44
End: 2024-10-23
Payer: COMMERCIAL

## 2024-10-23 VITALS
SYSTOLIC BLOOD PRESSURE: 140 MMHG | BODY MASS INDEX: 31.81 KG/M2 | OXYGEN SATURATION: 100 % | WEIGHT: 197.09 LBS | HEART RATE: 70 BPM | TEMPERATURE: 97.2 F | RESPIRATION RATE: 18 BRPM | DIASTOLIC BLOOD PRESSURE: 80 MMHG

## 2024-10-23 DIAGNOSIS — C7A.8 NEUROENDOCRINE CANCER: ICD-10-CM

## 2024-10-23 PROCEDURE — 1036F TOBACCO NON-USER: CPT | Performed by: INTERNAL MEDICINE

## 2024-10-23 PROCEDURE — 99215 OFFICE O/P EST HI 40 MIN: CPT | Performed by: INTERNAL MEDICINE

## 2024-10-23 ASSESSMENT — PAIN SCALES - GENERAL: PAINLEVEL_OUTOF10: 5

## 2024-10-23 NOTE — PROGRESS NOTES
Patient ID: Brooke Chu is a 44 y.o. female.  Visit type: Follow up telephone visit    A telephone visit (audio only) between the patient (at the originating site) and the provider (at the distant site) was utilized to provide this telehealth service.   Verbal consent was requested and obtained from Brooke Chu on this date, 10/23/24 for a telehealth visit.       Current Therapy  Treatment Plan:  Lanreotide, Every 28 Days      ONCOLOGIC HISTORY  44-year-old woman with a several year history of neck pain on the Rt side and for exploration they did full imaging include CT neck, chest and AP on 06/30/2020  which showed a large lobulated heterogeneously enhancing mass lesion involving segments 7 and 8 of the right hepatic lobe measuring approximately 13.2 x 10.5 x 9.7 cm in size. 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   MRI showed large hypervascular heterogeneously enhancing hepatic mass lesion in segment 7 and 8 and multiple other satellite hepatic lesions involving right and left hepatic lobes. Stable subcentimeter zayda hepatis lymph nodes. No other significant lymphadenopathy  or free intraperitoneal fluid.  PET-NET showed Dominant centrally necrotic somatostatin receptor expressing mass within hepatic segments 7 and 8. Additional satellite lesions in the bilateral hepatic lobes as described above. Somatostatin avid Left internal iliac lymph nodes. No additional  sites of abnormal Ga-68 dotatate uptake suggestive of malignancy identified.  PET-FDG didn't show any extra glucose uptake  Colonoscopy and SB enteroscopy didn't show any primary lesion. Symptoms of diarrhea and intermittent flushing s/p 1 dose of lanreotide after which she had severe bloating and abdominal pain as well as diarrhea   10/27: She had partial hepatectomy (segment 3, 5, 6), liver ablation (segment  5, 3), cholecystectomy   She recovered well with surgery. She was tested positive for COVID  and only symptoms she has was loss of taste and stuffy nose.   She started on lanreotide but d/t multiple side effects including gas, bloating, and some hypoglycemia episodes she was switched to Octreotide 30 mg in December of 2021  MRI abd/pelvis 1/7/2021 appears stable but limited imaging d/t inability to do with contrast as IV infiltrated during scan per patient.     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 No abdominal pain, no nausea, or vomiting.    ROS  All 14 systems have been reviewed and were negative except for the HPI.      OBJECTIVE:    VS:  There were no vitals taken for this visit.  Weight:   There were no vitals filed for this visit.      BSA: There is no height or weight on file to calculate BSA.      General: Appears well and in NAD  Eyes: PERRL, EOMI, clear sclera  ENMT: mucous membranes moist, no apparent injury, no lesions seen  Head/Neck: Neck supple, no apparent injury, thyroid without mass or tenderness, No JVD, trachea midline,   Thorax: Patent airways, CTAB, normal breath sounds with good chest expansion, thorax symmetric  Cardiovascular: Regular, rate and rhythm, no murmurs, 2+ equal pulses of the extremities, normal S 1and S 2  Gastrointestinal: Nondistended, soft, non-tender, no rebound tenderness or guarding, no masses palpable, no organomegaly, +BS  Musculoskeletal: ROM intact, no joint swelling, normal strength  Extremities: normal extremities, no cyanosis edema, contusions or wounds, no clubbing  Neurological: alert and oriented x3, intact senses, motor, response and reflexes, normal strength  Lymphatic: No significant lymphadenopathy  Psychological: Appropriate mood and behavior  Skin: Warm and dry, no lesions, no rashes      Diagnostic Results     Comprehensive  Metabolic Panel              Component  Ref Range & Units 2 wk ago  (1/24/24) 4 mo ago  (10/6/23) 7 mo ago  (7/12/23) 10 mo ago  (3/29/23) 1 yr ago  (10/24/22) 1 yr ago  (8/27/22) 1 yr ago  (7/20/22)   Glucose  74 - 99 mg/dL 90 112 High  94 121 High  103 High  97 98   Sodium  136 - 145 mmol/L 140 137 143 140 139 137 136   Potassium  3.5 - 5.3 mmol/L 4.8 4.1 4.1 4.3 3.8 4.1 4.0   Chloride  98 - 107 mmol/L 106 105 108 High  105 108 High  107 103   Bicarbonate  21 - 32 mmol/L 25 25 28 28 23 21 26   Anion Gap  10 - 20 mmol/L 14 11 11 11 12 13 11   Urea Nitrogen  6 - 23 mg/dL 11 15 13 9 10 12 11   Creatinine  0.50 - 1.05 mg/dL 0.73 0.77 0.81 0.63 0.63 0.61 0.74   eGFR  >60 mL/min/1.73m*2 >90 >90 CM        Comment: Calculations of estimated GFR are performed using the 2021 CKD-EPI Study Refit equation without the race variable for the IDMS-Traceable creatinine methods.  https://jasn.asnjournals.org/content/early/2021/09/22/ASN.5372534669   Calcium  8.6 - 10.3 mg/dL 8.9 9.1 9.4 9.4 R 8.7 8.5 Low  8.7   Albumin  3.4 - 5.0 g/dL 4.1 4.1 4.2 4.2 4.0  4.1   Alkaline Phosphatase  33 - 110 U/L 80 73 70 68 63  65   Total Protein  6.4 - 8.2 g/dL 6.5 7.3 7.1 7.0 6.8  7.1   AST  9 - 39 U/L 16 19 14 13 13  20   Bilirubin, Total  0.0 - 1.2 mg/dL 0.4 0.4 0.3 0.4 0.5  0.4   ALT  7 - 45 U/L 15 19 CM 11 CM            Contains abnormal data CBC and Auto Differential                Component  Ref Range & Units 2 wk ago  (1/24/24) 4 mo ago  (10/6/23) 7 mo ago  (7/12/23) 10 mo ago  (3/29/23) 1 yr ago  (10/24/22) 1 yr ago  (7/20/22) 1 yr ago  (7/8/22)   WBC  4.4 - 11.3 x10*3/uL 11.8 High  7.5 9.5 R 7.9 R 7.4 R 6.8 R 9.0 R   nRBC  0.0 - 0.0 /100 WBCs 0.0 0.0        RBC  4.00 - 5.20 x10*6/uL 4.95 4.80 4.73 R 4.79 R 4.55 R 4.54 R 4.71 R   Hemoglobin  12.0 - 16.0 g/dL 14.7 14.6 14.2 14.1 13.6 13.4 14.0   Hematocrit  36.0 - 46.0 % 45.4 44.1 42.5 42.4 41.6 42.1 43.1   MCV  80 - 100 fL 92 92 90 89 91 93 92   MCH  26.0 - 34.0 pg 29.7 30.4         MCHC  32.0 - 36.0 g/dL 32.4 33.1 33.4 33.3 32.7 31.8 Low  32.5   RDW  11.5 - 14.5 % 13.0 12.9 13.5 13.5 13.3 13.2 12.9   Platelets  150 - 450 x10*3/uL 244 229 205 R 217 R 183 R 191 R 220 R   Neutrophils %  40.0 - 80.0 % 67.3 55.6 63.3 55.6 56.8 54.0 64.2   Immature Granulocytes %, Automated  0.0 - 0.9 % 0.4 0.3 CM 0.5 CM 0.4 CM 0.1 CM 0.6 CM 0.2 CM   Comment: Immature Granulocyte Count (IG) includes promyelocytes, myelocytes and metamyelocytes but does not include bands. Percent differential counts (%) should be interpreted in the context of the absolute cell counts (cells/UL).   Lymphocytes %  13.0 - 44.0 % 23.3 33.8 26.2 33.2 31.8 34.2 25.6 CM   Monocytes %  2.0 - 10.0 % 6.9 7.5 7.2 7.9 8.4 7.9 7.6   Eosinophils %  0.0 - 6.0 % 1.6 2.0 2.3 2.3 2.4 2.3 2.0   Basophils %  0.0 - 2.0 % 0.5 0.8 0.5 0.6 0.5 1.0 0.4   Neutrophils Absolute  1.20 - 7.70 x10*3/uL 7.90 High  4.16 CM 6.02 R 4.41 R 4.20 R 3.67 R 5.78 R   Comment: Percent differential counts (%) should be interpreted in the context of the absolute cell counts (cells/uL).   Immature Granulocytes Absolute, Automated  0.00 - 0.70 x10*3/uL 0.05 0.02        Lymphocytes Absolute  1.20 - 4.80 x10*3/uL 2.74 2.53 2.49 R 2.64 R 2.35 R 2.33 R 2.30 R   Monocytes Absolute  0.10 - 1.00 x10*3/uL 0.81 0.56 0.69 R 0.63 R 0.62 R 0.54 R 0.68 R   Eosinophils Absolute  0.00 - 0.70 x10*3/uL 0.19 0.15 0.22 R 0.18 R 0.18 R 0.16 R 0.18 R   Basophils Absolute  0.00 - 0.10 x10*3/uL 0.06 0.06 0.05 R 0.05 R 0.04 R 0.07 R 0.04 R   MPV  11.1 R             5-Hydroxyindoleacetic Acid,Plasma          Component  Ref Range & Units 2 wk ago 4 mo ago 10 mo ago   5-Hydroxyindoleacetic Acid (5-HIAA),Plasma  ng/mL 5.7 6.2 CM 3.3 CM          NM PET CT dotatate  Narrative: Interpreted By:  Jose Burk and Dervishi Mario   STUDY:  NM PET CT DOTATATE;  10/7/2024 12:51 pm      INDICATION:  Signs/Symptoms:NET Restaging. 44-year-old female with a history of  well-differentiated neuroendocrine tumor with  liver metastasis.  Patient is status post partial right hepatectomy, liver ablation of  segment 5 and 3 and cholecystectomy. MRI of the abdomen obtained on  09/30/2024 demonstrating concern for new enhancing hepatic lesions.      ,C7A.8 Other malignant neuroendocrine tumors      COMPARISON:  PET-CT: 02/19/2024      ACCESSION NUMBER(S):  [id number]      ORDERING CLINICIAN:  LIGIA MOON      TECHNIQUE:  DIVISION OF NUCLEAR MEDICINE  POSITRON EMISSION TOMOGRAPHY (PET-CT)      The patient received an intravenous dose of 5.8 mCi of Ga-68  DOTATATE. Positron emission tomographic (PET) images from skull base  to mid-thigh were then acquired after a one hour delay. Also acquired  was a contemporaneous low dose non-contrast CT scan performed for  attenuation correction of PET images and anatomic localization.  The  PET and CT images were digitally fused for display.  All images were  acquired on a combined PET-CT scanner unit.  Some areas of FDG  accumulation may be described in standardized uptake value (SUV)  units.      CODING:  Subsequent Treatment Strategy (PS)      CALIBRATION:  Dose Injection-to-Scan Interval (mins): 75 min  Mediastinal bloodpool SUV (normal 1.5-2.5): 1      FINDINGS:  NECK:  *Physiologic radiotracer uptake is seen in the pituitary gland.  *No evidence of paranasal sinus disease.  *No Ga-68 dotatate avid lymphadenopathy in the head or neck.      CHEST:  * No concerning pulmonary nodule.  * No Ga-68 dotatate avid lymphadenopathy in the thorax.      ABDOMEN AND PELVIS:  *Status post segmentectomy with a persistent small somatostatin  receptor expressing lesion in the apical most portion of segment  8/dome at the surgical bed margin noted which remains similar  compared to prior imaging. *There is interval resolution of the  hepatic somatostatin receptor expressing segment 5 lesion status post  ablation. Additional post ablated changes are also noted in the  hepatic segment 8 with interval treatment of  the lesion within the  area previously seen on PET-CT. *An additional smaller somatostatin  receptor expressing lesion is also seen on hepatic segment 4B. *No  Ga-68 dotatate avid lymphadenopathy in the abdomen or pelvis  *Physiologic Ga-68 dotatate uptake is seen in the liver, spleen,  kidneys, adrenals and bowel.      MUSCULOSKELETAL:  *No concerning Ga-68 dotatate avid bone lesion throughout the axial  and appendicular skeleton.      Impression: 1. Status post right partial segmentectomy with persistent Ga68  DOTATATE avid lesion in the hepatic segment 8 adjacent to the  surgical bed as well as similar appearance of Ga68 DOTATATE avid  lesion in the hepatic segment 4B with mildly increased radiotracer  uptake in comparison to prior study. Findings likely representing  residual viable disease. Status post ablation at hepatic segments 5  and 8. Please correlate with recent MR for better assessment.  2. No PET evidence of FDG avid metastasis outside of liver.      I personally reviewed the image(s) / study and agree with the  findings and interpretation as stated. This study was interpreted at  [facility].      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 The possibility of origin in pancreas and duodenum should be excluded.   MRI showed large  hypervascular heterogeneously enhancing hepatic mass lesion in segment 7 and 8 and multiple other satellite hepatic lesions involving right and left hepatic lobes. Stable subcentimeter zayda hepatis lymph nodes. No other significant lymphadenopathy  or free intraperitoneal fluid.  PET-NET showed Dominant centrally necrotic somatostatin receptor expressing mass within hepatic segments 7 and 8. Additional satellite lesions in the bilateral hepatic lobes as described above. Somatostatin avid Left internal iliac lymph nodes. No additional  sites of abnormal Ga-68 dotatate uptake suggestive of malignancy identified.  PET-FDG didnt show any extra glucose uptake  Colonoscopy and SB enteroscopy didn't show any primary lesion  10/27/2020: She had partial hepatectomy (segment 3, 5, 6), liver ablation (segment 5, 3), cholecystectomy and pth confirmed G2 well diff NET (kI67 3.1%)    She recovered well with surgery. She was tested positive for COVID  and only symptoms she has was loss of taste and stuffy nose.   04/26/2021: Restaging CT scan showed post surgical changes and postablation changes in the liver with no new lesions  MRI showed At least 4 foci of restricted diffusion are identified in segments 8 and 3 without correlating abnormality on other sequences. However 2 of these foci correlate with hypervascular foci and a prior CT scan.  Findings are nonspecific, however  metastatic foci cannot be excluded  MRI showed stable disease on 08/16/2021  PET-Ga68 on 11/2021 showed multiple small (3-4) somatostatin avid foci are noted scattered throughout the hepatic  parenchyma which appear stable number and relative intensity compared to the prior exam   Octreotide 30 mg started 12/2021 as she was not tolerating lanreotide well with multiple side effects.  MRI Brain 12/13 done for recurrent headaches/ to rule out mets- Scan WNL  MRI abd/pelvis 1/7/2021 appears stable but limited imaging d/t inability to do with contrast as IV  "infiltrated during scan per patient.   Repeated MRI on 03/2022: Showed stable disease with one new liver lesion   Repeated MRI on 07/23/2022: Showed stable disease   PET scan on 10/14/2022 shows stable disease  MRI AP on 3/6/2023 shows stable disease. 5HIAA 3.3 on 3/29/23  MRI AP on 7/3/2023 shows stable disease  MRI AP on 10/14/23 shows: \"Status post partial right hepatectomy. Stable indeterminate hepatic arterial enhancing foci as detailed above. No new suspicious lesions.\"  MRI AP on 1/2/24 shows: Few small subcentimeter arterially enhancing foci without signal abnormality on other postcontrast sequences or anatomic sequences  Patient presented to TB with recommendations: \"Liver foci are very small and not sure if these are new NETs. MRI w eovist to compare with MRI on 10/2023 and PET-CU64. Resume SSA and if PET-CU64 only showed liver lesions will proceed with surgery vs ablation\"  PET-CU64 showed stable multiple somatostatin avid lesions are scattered throughout the liver  MRI w eovist showed enhancing hepatic segment 8 lesion and stable arterial enhancing hepatic segment 5 lesion  She had MWA on 04/22/2024  Restaging MRI on 06/2024: Showed New ablation sites involving the liver. One of the previous suspicious diffusion restricting hepatic lesions is no longer identified. A subcentimeter arterial enhancing nodule in segment VIII appears similar to prior exam  We held SSA due to abdominal pain, bloating and fatigue   09/30/2024: Restaging MRI showed previous arterial enhancing observation without restricted diffusion in hepatic segment VIII is not significantly changed. Three  additional subcentimeter arterial enhancing observations in the liver  without restricted diffusion appear new since prior exam.  10/2024; PET-Ga68 showed persistent Ga68 DOTATATE avid lesion in the hepatic segment 4B and 8.     Plan:    -Will send for surgical consultation and if not she is interested to hear about histotripsy   -RTC after " scans for follow up      Sotero Arango M.D.   and Director of the Neuroendocrine Tumor Program  Gastrointestinal Medical Oncology  Department of Hematology and Oncology  [facility], [place] [address]  Phone (Office): [phone]  Fax:  [phone]   Email: [email or web address]  Learn more about [facility] Comprehensive Neuroendocrine Tumor Program: Click Here  Learn more about [place] [employer]: [email or web address]

## 2024-10-30 ENCOUNTER — OFFICE VISIT (OUTPATIENT)
Dept: SURGICAL ONCOLOGY | Facility: HOSPITAL | Age: 44
End: 2024-10-30
Payer: COMMERCIAL

## 2024-10-30 VITALS
DIASTOLIC BLOOD PRESSURE: 85 MMHG | BODY MASS INDEX: 31.64 KG/M2 | RESPIRATION RATE: 18 BRPM | SYSTOLIC BLOOD PRESSURE: 142 MMHG | HEIGHT: 66 IN | WEIGHT: 196.87 LBS | TEMPERATURE: 98.4 F | HEART RATE: 80 BPM | OXYGEN SATURATION: 100 %

## 2024-10-30 DIAGNOSIS — C7A.8 NEUROENDOCRINE CANCER: ICD-10-CM

## 2024-10-30 PROCEDURE — 99213 OFFICE O/P EST LOW 20 MIN: CPT | Performed by: SURGERY

## 2024-10-30 PROCEDURE — 1036F TOBACCO NON-USER: CPT | Performed by: SURGERY

## 2024-10-30 PROCEDURE — 3008F BODY MASS INDEX DOCD: CPT | Performed by: SURGERY

## 2024-10-30 ASSESSMENT — PAIN SCALES - GENERAL: PAINLEVEL_OUTOF10: 0-NO PAIN

## 2024-11-20 ENCOUNTER — TELEPHONE (OUTPATIENT)
Dept: PRIMARY CARE | Facility: CLINIC | Age: 44
End: 2024-11-20
Payer: COMMERCIAL

## 2024-11-20 NOTE — TELEPHONE ENCOUNTER
Pt is calling with a question, can she wean off of the Weygovy before she starts cancer treatments, she took the 1.75 mg today and has 4 of the 1 mgs, then stop after the 4 doses. She may be starting the treatment in Dec. Please advise and call back [phone].

## 2024-11-21 NOTE — TELEPHONE ENCOUNTER
This seems perfectly reasonable.  Patient can feel free to keep me in the loop if she has any trouble tapering down or if additional prescriptions need to be sent

## 2024-12-04 ENCOUNTER — OFFICE VISIT (OUTPATIENT)
Dept: HEMATOLOGY/ONCOLOGY | Facility: HOSPITAL | Age: 44
End: 2024-12-04
Payer: COMMERCIAL

## 2024-12-04 ENCOUNTER — APPOINTMENT (OUTPATIENT)
Dept: SURGICAL ONCOLOGY | Facility: HOSPITAL | Age: 44
End: 2024-12-04
Payer: COMMERCIAL

## 2024-12-04 ENCOUNTER — TELEMEDICINE (OUTPATIENT)
Dept: SURGICAL ONCOLOGY | Facility: HOSPITAL | Age: 44
End: 2024-12-04
Payer: COMMERCIAL

## 2024-12-04 VITALS
BODY MASS INDEX: 30.51 KG/M2 | RESPIRATION RATE: 18 BRPM | SYSTOLIC BLOOD PRESSURE: 132 MMHG | TEMPERATURE: 97.7 F | HEART RATE: 67 BPM | OXYGEN SATURATION: 100 % | WEIGHT: 188.93 LBS | DIASTOLIC BLOOD PRESSURE: 79 MMHG

## 2024-12-04 DIAGNOSIS — C7A.8 NEUROENDOCRINE CANCER: Primary | ICD-10-CM

## 2024-12-04 DIAGNOSIS — Z85.89 HISTORY OF NEUROENDOCRINE CANCER: Primary | ICD-10-CM

## 2024-12-04 PROCEDURE — 99441 PR PHYS/QHP TELEPHONE EVALUATION 5-10 MIN: CPT | Performed by: SURGERY

## 2024-12-04 PROCEDURE — 99215 OFFICE O/P EST HI 40 MIN: CPT | Performed by: INTERNAL MEDICINE

## 2024-12-04 RX ORDER — OCTREOTIDE ACETATE,MI-SPHERES 30 MG
30 VIAL (EA) INTRAMUSCULAR ONCE
OUTPATIENT
Start: 2025-01-15

## 2024-12-04 RX ORDER — FAMOTIDINE 10 MG/ML
20 INJECTION INTRAVENOUS ONCE AS NEEDED
OUTPATIENT
Start: 2025-01-15

## 2024-12-04 RX ORDER — DIPHENHYDRAMINE HYDROCHLORIDE 50 MG/ML
50 INJECTION INTRAMUSCULAR; INTRAVENOUS AS NEEDED
OUTPATIENT
Start: 2025-01-15

## 2024-12-04 RX ORDER — ALBUTEROL SULFATE 0.83 MG/ML
3 SOLUTION RESPIRATORY (INHALATION) AS NEEDED
OUTPATIENT
Start: 2025-01-15

## 2024-12-04 RX ORDER — EPINEPHRINE 0.3 MG/.3ML
0.3 INJECTION SUBCUTANEOUS EVERY 5 MIN PRN
OUTPATIENT
Start: 2025-01-15

## 2024-12-04 ASSESSMENT — PAIN SCALES - GENERAL: PAINLEVEL_OUTOF10: 0-NO PAIN

## 2024-12-04 NOTE — PROGRESS NOTES
Patient ID: Brooke Chu is a 44 y.o. female.  Visit type: Follow up telephone visit    A telephone visit (audio only) between the patient (at the originating site) and the provider (at the distant site) was utilized to provide this telehealth service.   Verbal consent was requested and obtained from Brooke Chu on this date, 12/04/24 for a telehealth visit.       Current Therapy  Treatment Plan:  [No matching plan found]      ONCOLOGIC HISTORY  44-year-old woman with a several year history of neck pain on the Rt side and for exploration they did full imaging include CT neck, chest and AP on 06/30/2020  which showed a large lobulated heterogeneously enhancing mass lesion involving segments 7 and 8 of the right hepatic lobe measuring approximately 13.2 x 10.5 x 9.7 cm in size. 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   MRI showed large hypervascular heterogeneously enhancing hepatic mass lesion in segment 7 and 8 and multiple other satellite hepatic lesions involving right and left hepatic lobes. Stable subcentimeter zayda hepatis lymph nodes. No other significant lymphadenopathy  or free intraperitoneal fluid.  PET-NET showed Dominant centrally necrotic somatostatin receptor expressing mass within hepatic segments 7 and 8. Additional satellite lesions in the bilateral hepatic lobes as described above. Somatostatin avid Left internal iliac lymph nodes. No additional  sites of abnormal Ga-68 dotatate uptake suggestive of malignancy identified.  PET-FDG didn't show any extra glucose uptake  Colonoscopy and SB enteroscopy didn't show any primary lesion. Symptoms of diarrhea and intermittent flushing s/p 1 dose of lanreotide after which she had severe bloating and abdominal pain as well as diarrhea   10/27: She had partial hepatectomy (segment 3, 5, 6), liver ablation (segment  5, 3), cholecystectomy   She recovered well with surgery. She was tested positive for COVID  and only symptoms she has was loss of taste and stuffy nose.   She started on lanreotide but d/t multiple side effects including gas, bloating, and some hypoglycemia episodes she was switched to Octreotide 30 mg in December of 2021  MRI abd/pelvis 1/7/2021 appears stable but limited imaging d/t inability to do with contrast as IV infiltrated during scan per patient.     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 No abdominal pain, no nausea, or vomiting.    ROS  All 14 systems have been reviewed and were negative except for the HPI.      OBJECTIVE:    VS:  /79 (BP Location: Left arm, Patient Position: Sitting, BP Cuff Size: Adult)   Pulse 67   Temp 36.5 °C (97.7 °F) (Temporal)   Resp 18   Wt 85.7 kg (188 lb 15 oz)   SpO2 100%   BMI 30.51 kg/m²   Weight:   Vitals:    12/04/24 0855   Weight: 85.7 kg (188 lb 15 oz)         BSA: 2 meters squared      General: Appears well and in NAD  Eyes: PERRL, EOMI, clear sclera  ENMT: mucous membranes moist, no apparent injury, no lesions seen  Head/Neck: Neck supple, no apparent injury, thyroid without mass or tenderness, No JVD, trachea midline,   Thorax: Patent airways, CTAB, normal breath sounds with good chest expansion, thorax symmetric  Cardiovascular: Regular, rate and rhythm, no murmurs, 2+ equal pulses of the extremities, normal S 1and S 2  Gastrointestinal: Nondistended, soft, non-tender, no rebound tenderness or guarding, no masses palpable, no organomegaly, +BS  Musculoskeletal: ROM intact, no joint swelling, normal strength  Extremities: normal extremities, no cyanosis edema, contusions or wounds, no clubbing  Neurological: alert and oriented x3, intact senses, motor, response and reflexes, normal strength  Lymphatic: No significant  lymphadenopathy  Psychological: Appropriate mood and behavior  Skin: Warm and dry, no lesions, no rashes      Diagnostic Results     Comprehensive Metabolic Panel              Component  Ref Range & Units 2 wk ago  (1/24/24) 4 mo ago  (10/6/23) 7 mo ago  (7/12/23) 10 mo ago  (3/29/23) 1 yr ago  (10/24/22) 1 yr ago  (8/27/22) 1 yr ago  (7/20/22)   Glucose  74 - 99 mg/dL 90 112 High  94 121 High  103 High  97 98   Sodium  136 - 145 mmol/L 140 137 143 140 139 137 136   Potassium  3.5 - 5.3 mmol/L 4.8 4.1 4.1 4.3 3.8 4.1 4.0   Chloride  98 - 107 mmol/L 106 105 108 High  105 108 High  107 103   Bicarbonate  21 - 32 mmol/L 25 25 28 28 23 21 26   Anion Gap  10 - 20 mmol/L 14 11 11 11 12 13 11   Urea Nitrogen  6 - 23 mg/dL 11 15 13 9 10 12 11   Creatinine  0.50 - 1.05 mg/dL 0.73 0.77 0.81 0.63 0.63 0.61 0.74   eGFR  >60 mL/min/1.73m*2 >90 >90 CM        Comment: Calculations of estimated GFR are performed using the 2021 CKD-EPI Study Refit equation without the race variable for the IDMS-Traceable creatinine methods.  https://jasn.asnjournals.org/content/early/2021/09/22/ASN.8694446871   Calcium  8.6 - 10.3 mg/dL 8.9 9.1 9.4 9.4 R 8.7 8.5 Low  8.7   Albumin  3.4 - 5.0 g/dL 4.1 4.1 4.2 4.2 4.0  4.1   Alkaline Phosphatase  33 - 110 U/L 80 73 70 68 63  65   Total Protein  6.4 - 8.2 g/dL 6.5 7.3 7.1 7.0 6.8  7.1   AST  9 - 39 U/L 16 19 14 13 13  20   Bilirubin, Total  0.0 - 1.2 mg/dL 0.4 0.4 0.3 0.4 0.5  0.4   ALT  7 - 45 U/L 15 19 CM 11 CM            Contains abnormal data CBC and Auto Differential                Component  Ref Range & Units 2 wk ago  (1/24/24) 4 mo ago  (10/6/23) 7 mo ago  (7/12/23) 10 mo ago  (3/29/23) 1 yr ago  (10/24/22) 1 yr ago  (7/20/22) 1 yr ago  (7/8/22)   WBC  4.4 - 11.3 x10*3/uL 11.8 High  7.5 9.5 R 7.9 R 7.4 R 6.8 R 9.0 R   nRBC  0.0 - 0.0 /100 WBCs 0.0 0.0        RBC  4.00 - 5.20 x10*6/uL 4.95 4.80 4.73 R 4.79 R 4.55 R 4.54 R 4.71 R   Hemoglobin  12.0 - 16.0 g/dL 14.7 14.6 14.2 14.1 13.6 13.4 14.0    Hematocrit  36.0 - 46.0 % 45.4 44.1 42.5 42.4 41.6 42.1 43.1   MCV  80 - 100 fL 92 92 90 89 91 93 92   MCH  26.0 - 34.0 pg 29.7 30.4        MCHC  32.0 - 36.0 g/dL 32.4 33.1 33.4 33.3 32.7 31.8 Low  32.5   RDW  11.5 - 14.5 % 13.0 12.9 13.5 13.5 13.3 13.2 12.9   Platelets  150 - 450 x10*3/uL 244 229 205 R 217 R 183 R 191 R 220 R   Neutrophils %  40.0 - 80.0 % 67.3 55.6 63.3 55.6 56.8 54.0 64.2   Immature Granulocytes %, Automated  0.0 - 0.9 % 0.4 0.3 CM 0.5 CM 0.4 CM 0.1 CM 0.6 CM 0.2 CM   Comment: Immature Granulocyte Count (IG) includes promyelocytes, myelocytes and metamyelocytes but does not include bands. Percent differential counts (%) should be interpreted in the context of the absolute cell counts (cells/UL).   Lymphocytes %  13.0 - 44.0 % 23.3 33.8 26.2 33.2 31.8 34.2 25.6 CM   Monocytes %  2.0 - 10.0 % 6.9 7.5 7.2 7.9 8.4 7.9 7.6   Eosinophils %  0.0 - 6.0 % 1.6 2.0 2.3 2.3 2.4 2.3 2.0   Basophils %  0.0 - 2.0 % 0.5 0.8 0.5 0.6 0.5 1.0 0.4   Neutrophils Absolute  1.20 - 7.70 x10*3/uL 7.90 High  4.16 CM 6.02 R 4.41 R 4.20 R 3.67 R 5.78 R   Comment: Percent differential counts (%) should be interpreted in the context of the absolute cell counts (cells/uL).   Immature Granulocytes Absolute, Automated  0.00 - 0.70 x10*3/uL 0.05 0.02        Lymphocytes Absolute  1.20 - 4.80 x10*3/uL 2.74 2.53 2.49 R 2.64 R 2.35 R 2.33 R 2.30 R   Monocytes Absolute  0.10 - 1.00 x10*3/uL 0.81 0.56 0.69 R 0.63 R 0.62 R 0.54 R 0.68 R   Eosinophils Absolute  0.00 - 0.70 x10*3/uL 0.19 0.15 0.22 R 0.18 R 0.18 R 0.16 R 0.18 R   Basophils Absolute  0.00 - 0.10 x10*3/uL 0.06 0.06 0.05 R 0.05 R 0.04 R 0.07 R 0.04 R   MPV  11.1 R             5-Hydroxyindoleacetic Acid,Plasma          Component  Ref Range & Units 2 wk ago 4 mo ago 10 mo ago   5-Hydroxyindoleacetic Acid (5-HIAA),Plasma  ng/mL 5.7 6.2 CM 3.3 CM          NM PET CT dotatate  Narrative: Interpreted By:  Jose Burk,  and Milana Ham   STUDY:  NM PET CT DOTATATE;  10/7/2024  12:51 pm      INDICATION:  Signs/Symptoms:NET Restaging. 44-year-old female with a history of  well-differentiated neuroendocrine tumor with liver metastasis.  Patient is status post partial right hepatectomy, liver ablation of  segment 5 and 3 and cholecystectomy. MRI of the abdomen obtained on  09/30/2024 demonstrating concern for new enhancing hepatic lesions.      ,C7A.8 Other malignant neuroendocrine tumors      COMPARISON:  PET-CT: 02/19/2024      ACCESSION NUMBER(S):  [id number]      ORDERING CLINICIAN:  LIGIA MOON      TECHNIQUE:  DIVISION OF NUCLEAR MEDICINE  POSITRON EMISSION TOMOGRAPHY (PET-CT)      The patient received an intravenous dose of 5.8 mCi of Ga-68  DOTATATE. Positron emission tomographic (PET) images from skull base  to mid-thigh were then acquired after a one hour delay. Also acquired  was a contemporaneous low dose non-contrast CT scan performed for  attenuation correction of PET images and anatomic localization.  The  PET and CT images were digitally fused for display.  All images were  acquired on a combined PET-CT scanner unit.  Some areas of FDG  accumulation may be described in standardized uptake value (SUV)  units.      CODING:  Subsequent Treatment Strategy (PS)      CALIBRATION:  Dose Injection-to-Scan Interval (mins): 75 min  Mediastinal bloodpool SUV (normal 1.5-2.5): 1      FINDINGS:  NECK:  *Physiologic radiotracer uptake is seen in the pituitary gland.  *No evidence of paranasal sinus disease.  *No Ga-68 dotatate avid lymphadenopathy in the head or neck.      CHEST:  * No concerning pulmonary nodule.  * No Ga-68 dotatate avid lymphadenopathy in the thorax.      ABDOMEN AND PELVIS:  *Status post segmentectomy with a persistent small somatostatin  receptor expressing lesion in the apical most portion of segment  8/dome at the surgical bed margin noted which remains similar  compared to prior imaging. *There is interval resolution of the  hepatic somatostatin receptor  expressing segment 5 lesion status post  ablation. Additional post ablated changes are also noted in the  hepatic segment 8 with interval treatment of the lesion within the  area previously seen on PET-CT. *An additional smaller somatostatin  receptor expressing lesion is also seen on hepatic segment 4B. *No  Ga-68 dotatate avid lymphadenopathy in the abdomen or pelvis  *Physiologic Ga-68 dotatate uptake is seen in the liver, spleen,  kidneys, adrenals and bowel.      MUSCULOSKELETAL:  *No concerning Ga-68 dotatate avid bone lesion throughout the axial  and appendicular skeleton.      Impression: 1. Status post right partial segmentectomy with persistent Ga68  DOTATATE avid lesion in the hepatic segment 8 adjacent to the  surgical bed as well as similar appearance of Ga68 DOTATATE avid  lesion in the hepatic segment 4B with mildly increased radiotracer  uptake in comparison to prior study. Findings likely representing  residual viable disease. Status post ablation at hepatic segments 5  and 8. Please correlate with recent MR for better assessment.  2. No PET evidence of FDG avid metastasis outside of liver.      I personally reviewed the image(s) / study and agree with the  findings and interpretation as stated. This study was interpreted at  [facility].      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 Liver biopsy showed WELL DIFFERENTIATED NEUROENDOCRINE TUMOR, GRADE 2 with Ki67 was 5.8%  and positive for CDX2 but negative  for serotonin which is unusual for small bowel tumors. The possibility of origin in pancreas and duodenum should be excluded.   MRI showed large hypervascular heterogeneously enhancing hepatic mass lesion in segment 7 and 8 and multiple other satellite hepatic lesions involving right and left hepatic lobes. Stable subcentimeter zayda hepatis lymph nodes. No other significant lymphadenopathy  or free intraperitoneal fluid.  PET-NET showed Dominant centrally necrotic somatostatin receptor expressing mass within hepatic segments 7 and 8. Additional satellite lesions in the bilateral hepatic lobes as described above. Somatostatin avid Left internal iliac lymph nodes. No additional  sites of abnormal Ga-68 dotatate uptake suggestive of malignancy identified.  PET-FDG didnt show any extra glucose uptake  Colonoscopy and SB enteroscopy didn't show any primary lesion  10/27/2020: She had partial hepatectomy (segment 3, 5, 6), liver ablation (segment 5, 3), cholecystectomy and pth confirmed G2 well diff NET (kI67 3.1%)    She recovered well with surgery. She was tested positive for COVID  and only symptoms she has was loss of taste and stuffy nose.   04/26/2021: Restaging CT scan showed post surgical changes and postablation changes in the liver with no new lesions  MRI showed At least 4 foci of restricted diffusion are identified in segments 8 and 3 without correlating abnormality on other sequences. However 2 of these foci correlate with hypervascular foci and a prior CT scan.  Findings are nonspecific, however  metastatic foci cannot be excluded  MRI showed stable disease on 08/16/2021  PET-Ga68 on 11/2021 showed multiple small (3-4) somatostatin avid foci are noted scattered throughout the hepatic  parenchyma which appear stable number and relative intensity compared to the prior exam   Octreotide 30 mg started 12/2021 as she was not tolerating lanreotide well with multiple side effects.  MRI Brain 12/13 done for  "recurrent headaches/ to rule out mets- Scan WNL  MRI abd/pelvis 1/7/2021 appears stable but limited imaging d/t inability to do with contrast as IV infiltrated during scan per patient.   Repeated MRI on 03/2022: Showed stable disease with one new liver lesion   Repeated MRI on 07/23/2022: Showed stable disease   PET scan on 10/14/2022 shows stable disease  MRI AP on 3/6/2023 shows stable disease. 5HIAA 3.3 on 3/29/23  MRI AP on 7/3/2023 shows stable disease  MRI AP on 10/14/23 shows: \"Status post partial right hepatectomy. Stable indeterminate hepatic arterial enhancing foci as detailed above. No new suspicious lesions.\"  MRI AP on 1/2/24 shows: Few small subcentimeter arterially enhancing foci without signal abnormality on other postcontrast sequences or anatomic sequences  Patient presented to TB with recommendations: \"Liver foci are very small and not sure if these are new NETs. MRI w eovist to compare with MRI on 10/2023 and PET-CU64. Resume SSA and if PET-CU64 only showed liver lesions will proceed with surgery vs ablation\"  PET-CU64 showed stable multiple somatostatin avid lesions are scattered throughout the liver  MRI w eovist showed enhancing hepatic segment 8 lesion and stable arterial enhancing hepatic segment 5 lesion  She had MWA on 04/22/2024  Restaging MRI on 06/2024: Showed New ablation sites involving the liver. One of the previous suspicious diffusion restricting hepatic lesions is no longer identified. A subcentimeter arterial enhancing nodule in segment VIII appears similar to prior exam  We held SSA due to abdominal pain, bloating and fatigue   09/30/2024: Restaging MRI showed previous arterial enhancing observation without restricted diffusion in hepatic segment VIII is not significantly changed. Three  additional subcentimeter arterial enhancing observations in the liver  without restricted diffusion appear new since prior exam.  10/2024; PET-Ga68 showed persistent Ga68 DOTATATE avid lesion in " the hepatic segment 4B and 8.   11/06/2024: MRI liver demonstrated small subcentimeter  hypervascular foci throughout the liver   She is not eligible for histotripsy or surgical resection given many of them and they are small  Today: She had intermittent flushing and RUQ pain. No nausea or vomiting or diarrhea.     Plan:    -Will restart SSA with Octreotide 30 mg given she had side effects with lanreotide   -RTC after scans for follow up in 3 months with MRI AP      Sotero Arango M.D.   and Director of the Neuroendocrine Tumor Program  Gastrointestinal Medical Oncology  Department of Hematology and Oncology  [facility], [place] [address]  Phone (Office): [phone]  Fax:  [phone]   Email: [email or web address]  Learn more about [facility] Comprehensive Neuroendocrine Tumor Program: Click Here  Learn more about [place] [employer]: [email or web address]

## 2024-12-04 NOTE — PROGRESS NOTES
"I performed this visit using real-time telehealth tools, including a telephone connection between the patient who was at home as well as myself from the outpatient clinic setting.    Subjective     HPI  Brooke Chu is a 44 y.o. female who is here in followup for metastatic neuroendocrine tumor. She has a history of partial hepatectomy (segment 3, 5, 6), liver ablation (segment 5, 3), cholecystectomy on 10/27/20. Post-op course c/b acute blood loss anemia requiring PRBC transfusion; intraabdominal collection s/p IR drain; bile leak. 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  LIVER SEGMENT 7 & 8:    WELL DIFFERENTIATED NEUROENDOCRINE TUMOR, 12.5 CM, GRADE 2: LIVER (SEGMENTS 7  AND 8) PARTIAL HEPATECTOMY SPECIMEN    C.  SEGMENT 3 LIVER HEPATECTOMY:    WELL DIFFERENTIATED NEUROENDOCRINE TUMOR, 1.1 CM: LIVER (SEGMENT 3) WEDGE  RESECTION    D.  GALLBLADDER:    NO PATHOLOGICAL DIAGNOSIS: GALLBLADDER  NO PATHOLOGICAL DIAGNOSIS: LYMPH NODE, 1       - CHOLECYSTECTOMY SPECIMEN    E.  SEGMENT 5 HEPATECTOMY:    NO PATHOLOGICAL DIAGNOSIS: LIVER (SEGMENT 5) BIOPSY    F.  SEGMENT 6 LIVER HEPATECTOMY:    MULTIFOCAL WELL DIFFERENTIATED NEUROENDOCRINE TUMOR, 0.6 AND 0.3 CM, GRADE 1:  LIVER (SEGMENT 6) WEDGE RESECTION     MRI AP on 1/2/24 shows: Few small subcentimeter arterially enhancing foci without signal abnormality on other postcontrast sequences or anatomic sequences  Patient presented to TB with recommendations: \"Liver foci are very small and not sure if these are new NETs. MRI w eovist to compare with MRI on 10/2023 and PET-CU64. Resume SSA and if PET-CU64 only showed liver lesions will proceed with surgery vs ablation\"  PET-CU64 showed stable multiple somatostatin avid lesions are scattered throughout the liver  MRI w eovist showed enhancing hepatic segment 8 lesion and stable arterial enhancing hepatic segment 5 lesion  She had MWA on 04/22/2024  Restaging MRI on 06/2024: Showed New ablation sites involving " the liver. One of the previous suspicious diffusion restricting hepatic lesions is no longer identified. A subcentimeter arterial enhancing nodule in segment VIII appears similar to prior exam  We held SSA due to abdominal pain, bloating and fatigue     09/30/2024: Restaging MRI showed previous arterial enhancing observation without restricted diffusion in hepatic segment VIII is not significantly changed. Three  additional subcentimeter arterial enhancing observations in the liver  without restricted diffusion appear new since prior exam.     Underwent MRI liver on 11/5/2024 with the following result - Changes in the liver consistent with right partial hepatectomy site   and multiple segment 8 ablation sites.  There are small subcentimeter   hypervascular foci throughout the liver as described above.  The   possibility of this representing areas of neuroendocrine metastases   should be considered given patient's clinical history.  However, the   small size of the lesions does limit evaluation on MRI.  Options for   further evaluation include close/short-term imaging surveillance with MRI   or further evaluation with a dotatate PET CT.  No other evidence of   metastatic disease in the abdomen.     She was evaluated for the possibility of Histotripsy, but due to the location of one of the lesions as well as the number lesions, this was not an option for her.  She met with Dr. Brunner today and the plan is to restart octreotide in mid January 2025 and continued follow-up.      Study Result    Narrative & Impression   Interpreted By:  Jose Burk,  Kuldip Ham   STUDY:  NM PET CT DOTATATE;  10/7/2024 12:51 pm      INDICATION:  Signs/Symptoms:NET Restaging. 44-year-old female with a history of  well-differentiated neuroendocrine tumor with liver metastasis.  Patient is status post partial right hepatectomy, liver ablation of  segment 5 and 3 and cholecystectomy. MRI of the abdomen obtained on  09/30/2024  demonstrating concern for new enhancing hepatic lesions.      ,C7A.8 Other malignant neuroendocrine tumors      COMPARISON:  PET-CT: 02/19/2024      ACCESSION NUMBER(S):  [id number]      ORDERING CLINICIAN:  LIGIA MOON      TECHNIQUE:  DIVISION OF NUCLEAR MEDICINE  POSITRON EMISSION TOMOGRAPHY (PET-CT)      The patient received an intravenous dose of 5.8 mCi of Ga-68  DOTATATE. Positron emission tomographic (PET) images from skull base  to mid-thigh were then acquired after a one hour delay. Also acquired  was a contemporaneous low dose non-contrast CT scan performed for  attenuation correction of PET images and anatomic localization.  The  PET and CT images were digitally fused for display.  All images were  acquired on a combined PET-CT scanner unit.  Some areas of FDG  accumulation may be described in standardized uptake value (SUV)  units.      CODING:  Subsequent Treatment Strategy (PS)      CALIBRATION:  Dose Injection-to-Scan Interval (mins): 75 min  Mediastinal bloodpool SUV (normal 1.5-2.5): 1      FINDINGS:  NECK:  *Physiologic radiotracer uptake is seen in the pituitary gland.  *No evidence of paranasal sinus disease.  *No Ga-68 dotatate avid lymphadenopathy in the head or neck.      CHEST:  * No concerning pulmonary nodule.  * No Ga-68 dotatate avid lymphadenopathy in the thorax.      ABDOMEN AND PELVIS:  *Status post segmentectomy with a persistent small somatostatin  receptor expressing lesion in the apical most portion of segment  8/dome at the surgical bed margin noted which remains similar  compared to prior imaging. *There is interval resolution of the  hepatic somatostatin receptor expressing segment 5 lesion status post  ablation. Additional post ablated changes are also noted in the  hepatic segment 8 with interval treatment of the lesion within the  area previously seen on PET-CT. *An additional smaller somatostatin  receptor expressing lesion is also seen on hepatic segment 4B. *No  Ga-68  dotatate avid lymphadenopathy in the abdomen or pelvis  *Physiologic Ga-68 dotatate uptake is seen in the liver, spleen,  kidneys, adrenals and bowel.      MUSCULOSKELETAL:  *No concerning Ga-68 dotatate avid bone lesion throughout the axial  and appendicular skeleton.      IMPRESSION:  1. Status post right partial segmentectomy with persistent Ga68  DOTATATE avid lesion in the hepatic segment 8 adjacent to the  surgical bed as well as similar appearance of Ga68 DOTATATE avid  lesion in the hepatic segment 4B with mildly increased radiotracer  uptake in comparison to prior study. Findings likely representing  residual viable disease. Status post ablation at hepatic segments 5  and 8. Please correlate with recent MR for better assessment.  2. No PET evidence of FDG avid metastasis outside of liver.      I personally reviewed the image(s) / study and agree with the  findings and interpretation as stated. This study was interpreted at  [facility].      MACRO:  None      Signed by: Jose Burk 10/7/2024 6:00 PM       I reviewed both the recent MRI and PET scan.  The early arterial imaging of the MRI shows about 5-7 lesions which are all subcentimeter.  The PET scan shows 2 PET positive lesions, 1 at the dome adjacent to the prior resection site and the other in segment 4 posteriorly just anterior to the stomach.  Only the lesion at the dome correlates with the MRI findings.  It is unclear exactly how many of the MRI lesions are true tumors, but there does appear to be some residual disease based on the findings.    Patient is currently off octreotide shots and states that she feels better having been off the shots for the past 3 months.    She notes that she will be undergoing consultation at the [facility] regarding histotripsy as an option.      Current Outpatient Medications on File Prior to Visit   Medication Sig Dispense Refill    fexofenadine (Allegra) 60 mg tablet Take 1  tablet (60 mg) by mouth once daily.      furosemide (Lasix) 20 mg tablet Take 1 tablet (20 mg) by mouth if needed.      ibuprofen 200 mg tablet Take 1 tablet (200 mg) by mouth 3 times a day as needed. with food or milk      ondansetron (Zofran) 4 mg tablet Take 1 tablet (4 mg) by mouth every 8 hours if needed for nausea or vomiting. Take 2 tablets as needed      semaglutide, weight loss, (Wegovy) 1.7 mg/0.75 mL pen injector Inject 1.7 mg under the skin 1 (one) time per week. 3 mL 2    semaglutide, weight loss, (Wegovy) 2.4 mg/0.75 mL pen injector Inject 2.4 mg under the skin every 7 days. 3 mL 2     No current facility-administered medications on file prior to visit.      Allergies   Allergen Reactions    Corticosteroids (Glucocorticoids) Unknown    Methylprednisolone Other and Unknown     Abdominal pain    Other Unknown     steroids          Objective          Lab Results   Component Value Date    WBC 7.4 04/26/2024    HGB 13.2 04/26/2024    HCT 39.0 04/26/2024     04/26/2024     Lab Results   Component Value Date     10/02/2024    K 4.0 10/02/2024     10/02/2024    CO2 27 10/02/2024    BUN 10 10/02/2024    CREATININE 0.79 10/02/2024    EGFR >90 10/02/2024    CALCIUM 9.5 10/02/2024    ALBUMIN 4.3 10/02/2024    PROT 7.0 10/02/2024    AST 13 10/02/2024    ALT 9 10/02/2024    BILITOT 0.6 10/02/2024          === 09/30/24 ===    MR ABDOMEN W AND WO CONTRAST    - Impression -  1.  The previous arterial enhancing observation without restricted  diffusion in hepatic segment VIII is not significantly changed. Three  additional subcentimeter arterial enhancing observations in the liver  without restricted diffusion appear new since prior exam. Previous  ablation site otherwise appears unchanged.  2. No new sites of extrahepatic metastatic disease in the abdomen  otherwise identified.      MACRO:  None    Signed by: Jaya Hernandez 10/1/2024 11:48 AM  Dictation workstation:   [id number]      === 06/10/24  ===    MR ABDOMEN W AND WO CONTRAST    - Impression -  1.  New ablation sites involving the liver. One of the previous  suspicious diffusion restricting hepatic lesions is no longer  identified. A subcentimeter arterial enhancing nodule in segment VIII  appears similar to prior exam, although no definite restricted  diffusion is identified on this exam. No new hepatic lesions  otherwise identified.  2. No new sites of extrahepatic metastatic disease in the abdomen or  pelvis otherwise identified.      MACRO:  None    Signed by: Jaya Hernandez 6/11/2024 9:56 AM  Dictation workstation:   [id number]    ___________________________________________________________________________    MR PELVIS W AND WO CONTRAST    - Impression -  1.  New ablation sites involving the liver. One of the previous  suspicious diffusion restricting hepatic lesions is no longer  identified. A subcentimeter arterial enhancing nodule in segment VIII  appears similar to prior exam, although no definite restricted  diffusion is identified on this exam. No new hepatic lesions  otherwise identified.  2. No new sites of extrahepatic metastatic disease in the abdomen or  pelvis otherwise identified.      MACRO:  None    Signed by: Jaya Hernandez 6/11/2024 9:56 AM  Dictation workstation:   [id number]  NM PET CT dotatate    Result Date: 10/7/2024  Interpreted By:  Jose Burk and Dervishi Mario STUDY: NM PET CT DOTATATE;  10/7/2024 12:51 pm   INDICATION: Signs/Symptoms:NET Restaging. 44-year-old female with a history of well-differentiated neuroendocrine tumor with liver metastasis. Patient is status post partial right hepatectomy, liver ablation of segment 5 and 3 and cholecystectomy. MRI of the abdomen obtained on 09/30/2024 demonstrating concern for new enhancing hepatic lesions.   ,C7A.8 Other malignant neuroendocrine tumors   COMPARISON: PET-CT: 02/19/2024   ACCESSION NUMBER(S): [id number]   ORDERING CLINICIAN: LIGIA MOON   TECHNIQUE: DIVISION OF  NUCLEAR MEDICINE POSITRON EMISSION TOMOGRAPHY (PET-CT)   The patient received an intravenous dose of 5.8 mCi of Ga-68 DOTATATE. Positron emission tomographic (PET) images from skull base to mid-thigh were then acquired after a one hour delay. Also acquired was a contemporaneous low dose non-contrast CT scan performed for attenuation correction of PET images and anatomic localization.  The PET and CT images were digitally fused for display.  All images were acquired on a combined PET-CT scanner unit.  Some areas of FDG accumulation may be described in standardized uptake value (SUV) units.   CODING: Subsequent Treatment Strategy (PS)   CALIBRATION: Dose Injection-to-Scan Interval (mins): 75 min Mediastinal bloodpool SUV (normal 1.5-2.5): 1   FINDINGS: NECK: *Physiologic radiotracer uptake is seen in the pituitary gland. *No evidence of paranasal sinus disease. *No Ga-68 dotatate avid lymphadenopathy in the head or neck.   CHEST: * No concerning pulmonary nodule. * No Ga-68 dotatate avid lymphadenopathy in the thorax.   ABDOMEN AND PELVIS: *Status post segmentectomy with a persistent small somatostatin receptor expressing lesion in the apical most portion of segment 8/dome at the surgical bed margin noted which remains similar compared to prior imaging. *There is interval resolution of the hepatic somatostatin receptor expressing segment 5 lesion status post ablation. Additional post ablated changes are also noted in the hepatic segment 8 with interval treatment of the lesion within the area previously seen on PET-CT. *An additional smaller somatostatin receptor expressing lesion is also seen on hepatic segment 4B. *No Ga-68 dotatate avid lymphadenopathy in the abdomen or pelvis *Physiologic Ga-68 dotatate uptake is seen in the liver, spleen, kidneys, adrenals and bowel.   MUSCULOSKELETAL: *No concerning Ga-68 dotatate avid bone lesion throughout the axial and appendicular skeleton.       1. Status post right partial  segmentectomy with persistent Ga68 DOTATATE avid lesion in the hepatic segment 8 adjacent to the surgical bed as well as similar appearance of Ga68 DOTATATE avid lesion in the hepatic segment 4B with mildly increased radiotracer uptake in comparison to prior study. Findings likely representing residual viable disease. Status post ablation at hepatic segments 5 and 8. Please correlate with recent MR for better assessment. 2. No PET evidence of FDG avid metastasis outside of liver.   I personally reviewed the image(s) / study and agree with the findings and interpretation as stated. This study was interpreted at [facility].   MACRO: None   Signed by: Jose Burk 10/7/2024 6:00 PM Dictation workstation:   [id number]    MR abdomen w and wo IV contrast    Result Date: 10/1/2024  Interpreted By:  Jaya Hernandez, STUDY: MR ABDOMEN W AND WO IV CONTRAST;  9/30/2024 4:05 pm   INDICATION: Signs/Symptoms:NET restaging.   ,C7A.8 Other malignant neuroendocrine tumors   COMPARISON: 06/10/2024.   ACCESSION NUMBER(S): [id number]   ORDERING CLINICIAN: LIGIA MOON   TECHNIQUE: MRI LIVER; Multiplanar magnetic resonance images of the abdomen were obtained including the following sequences; T2-weighted SSFSE with and without fat saturation, T1-weighted GRE in/opposed phase, DWI, fat saturated 3D-T1w GRE pre and dynamically post contrast. 20 mL of Dotarem was administered intravenously without immediate complication.   FINDINGS: LIVER: No signal changes of steatosis.. Partial right hepatectomy changes are present. 7 mm arterial enhancing observation in segment VIII is stable on series 14, image 16/88. As before there is no associated restricted diffusion. There is a nonenhancing ablation zone near the junction of segments V and VIII.   There are a few new tiny arterial enhancing observations without contrast washout or restricted diffusion, for example: Segment VIII: 7 mm series 14, image 16/88 Segment  II/III: 6 mm and 5 mm observations image 35/88.   BILE DUCTS: No dilatation.   GALLBLADDER: Absent.   PANCREAS: Unremarkable. No ductal dilatation. No pancreas divisum.   SPLEEN: Unremarkable.   ADRENAL GLANDS: Unremarkable.   KIDNEYS: A few small left renal cysts. Otherwise unremarkable kidneys without hydronephrosis.   LYMPH NODES: No lymphadenopathy identified.   ABDOMINAL VESSELS: Abdominal aorta is patent without aneurysm. The major visceral arterial branches are patent. Major portal venous branches are patent. IVC and visualized major branches are patent.   BOWEL: No dilated bowel is visualized.   PERITONEUM/RETROPERITONEUM: No visualized free fluid.   BONES AND LOWER THORAX: Mild lumbar degenerative changes slight scoliosis. Grossly clear lung bases.         1.  The previous arterial enhancing observation without restricted diffusion in hepatic segment VIII is not significantly changed. Three additional subcentimeter arterial enhancing observations in the liver without restricted diffusion appear new since prior exam. Previous ablation site otherwise appears unchanged. 2. No new sites of extrahepatic metastatic disease in the abdomen otherwise identified.     MACRO: None   Signed by: Jaya Hernandez 10/1/2024 11:48 AM Dictation workstation:   [id number]      Assessment/Plan     44-year-old woman with recurrent neuroendocrine liver metastases.  At this point she will restart octreotide therapy and have continued follow-up.  She would like to avoid surgery if at all possible but understands that if there is progression in the liver which is amenable to surgery that this would be an option for her in the future.  She will be following closely with Dr. Brunner and will follow-up with me as needed.    Rojas Ramey MD

## 2025-01-15 ENCOUNTER — APPOINTMENT (OUTPATIENT)
Dept: HEMATOLOGY/ONCOLOGY | Facility: HOSPITAL | Age: 45
End: 2025-01-15
Payer: COMMERCIAL

## 2025-01-16 ENCOUNTER — INFUSION (OUTPATIENT)
Dept: HEMATOLOGY/ONCOLOGY | Facility: HOSPITAL | Age: 45
End: 2025-01-16
Payer: COMMERCIAL

## 2025-01-16 VITALS
SYSTOLIC BLOOD PRESSURE: 140 MMHG | BODY MASS INDEX: 29.86 KG/M2 | HEART RATE: 82 BPM | RESPIRATION RATE: 18 BRPM | OXYGEN SATURATION: 100 % | HEIGHT: 67 IN | TEMPERATURE: 97.3 F | DIASTOLIC BLOOD PRESSURE: 82 MMHG

## 2025-01-16 DIAGNOSIS — C7A.8 NEUROENDOCRINE CANCER: ICD-10-CM

## 2025-01-16 PROCEDURE — 96372 THER/PROPH/DIAG INJ SC/IM: CPT

## 2025-01-16 PROCEDURE — 2500000004 HC RX 250 GENERAL PHARMACY W/ HCPCS (ALT 636 FOR OP/ED): Mod: JZ,TB | Performed by: INTERNAL MEDICINE

## 2025-01-16 RX ORDER — ALBUTEROL SULFATE 0.83 MG/ML
3 SOLUTION RESPIRATORY (INHALATION) AS NEEDED
OUTPATIENT
Start: 2025-02-13

## 2025-01-16 RX ORDER — EPINEPHRINE 0.3 MG/.3ML
0.3 INJECTION SUBCUTANEOUS EVERY 5 MIN PRN
OUTPATIENT
Start: 2025-02-13

## 2025-01-16 RX ORDER — DIPHENHYDRAMINE HYDROCHLORIDE 50 MG/ML
50 INJECTION INTRAMUSCULAR; INTRAVENOUS AS NEEDED
OUTPATIENT
Start: 2025-02-13

## 2025-01-16 RX ORDER — OCTREOTIDE ACETATE,MI-SPHERES 30 MG
30 VIAL (EA) INTRAMUSCULAR ONCE
OUTPATIENT
Start: 2025-02-13

## 2025-01-16 RX ORDER — OCTREOTIDE ACETATE,MI-SPHERES 30 MG
30 VIAL (EA) INTRAMUSCULAR ONCE
Status: COMPLETED | OUTPATIENT
Start: 2025-01-16 | End: 2025-01-16

## 2025-01-16 RX ORDER — FAMOTIDINE 10 MG/ML
20 INJECTION INTRAVENOUS ONCE AS NEEDED
OUTPATIENT
Start: 2025-02-13

## 2025-01-16 RX ADMIN — OCTREOTIDE ACETATE 30 MG: KIT at 15:01

## 2025-01-16 ASSESSMENT — PAIN SCALES - GENERAL: PAINLEVEL_OUTOF10: 0-NO PAIN

## 2025-01-16 ASSESSMENT — LIFESTYLE VARIABLES
HOW OFTEN DO YOU HAVE SIX OR MORE DRINKS ON ONE OCCASION: NEVER
HOW MANY STANDARD DRINKS CONTAINING ALCOHOL DO YOU HAVE ON A TYPICAL DAY: PATIENT DOES NOT DRINK
AUDIT-C TOTAL SCORE: 0
SKIP TO QUESTIONS 9-10: 1
HOW OFTEN DO YOU HAVE A DRINK CONTAINING ALCOHOL: NEVER

## 2025-01-16 ASSESSMENT — ENCOUNTER SYMPTOMS
DEPRESSION: 0
LOSS OF SENSATION IN FEET: 0
OCCASIONAL FEELINGS OF UNSTEADINESS: 0

## 2025-01-16 ASSESSMENT — COLUMBIA-SUICIDE SEVERITY RATING SCALE - C-SSRS
1. IN THE PAST MONTH, HAVE YOU WISHED YOU WERE DEAD OR WISHED YOU COULD GO TO SLEEP AND NOT WAKE UP?: NO
2. HAVE YOU ACTUALLY HAD ANY THOUGHTS OF KILLING YOURSELF?: NO
6. HAVE YOU EVER DONE ANYTHING, STARTED TO DO ANYTHING, OR PREPARED TO DO ANYTHING TO END YOUR LIFE?: NO

## 2025-01-31 ENCOUNTER — TELEPHONE (OUTPATIENT)
Dept: ADMISSION | Facility: HOSPITAL | Age: 45
End: 2025-01-31
Payer: COMMERCIAL

## 2025-01-31 NOTE — TELEPHONE ENCOUNTER
Patient has inquired about a clinical trial in [place], called [facility]. She wishes to pursue screening process if she is eligible for trial. Requested records be sent to group, provided her phone number for medical records to arrange sending records.    Patient wanted to know Dr. Arango's thoughts on this trial and if anything similar would be available in [place]. Next FUV with Dr. Arango is 2/12/25. Forwarding to provider

## 2025-02-12 ENCOUNTER — OFFICE VISIT (OUTPATIENT)
Dept: HEMATOLOGY/ONCOLOGY | Facility: HOSPITAL | Age: 45
End: 2025-02-12
Payer: COMMERCIAL

## 2025-02-12 VITALS
OXYGEN SATURATION: 100 % | HEART RATE: 60 BPM | RESPIRATION RATE: 18 BRPM | TEMPERATURE: 96.4 F | BODY MASS INDEX: 31.33 KG/M2 | WEIGHT: 198.19 LBS | SYSTOLIC BLOOD PRESSURE: 127 MMHG | DIASTOLIC BLOOD PRESSURE: 58 MMHG

## 2025-02-12 DIAGNOSIS — C7A.8 NEUROENDOCRINE CANCER: ICD-10-CM

## 2025-02-12 PROCEDURE — 1036F TOBACCO NON-USER: CPT | Performed by: INTERNAL MEDICINE

## 2025-02-12 PROCEDURE — 99215 OFFICE O/P EST HI 40 MIN: CPT | Performed by: INTERNAL MEDICINE

## 2025-02-12 ASSESSMENT — PAIN SCALES - GENERAL: PAINLEVEL_OUTOF10: 0-NO PAIN

## 2025-02-12 NOTE — PROGRESS NOTES
Patient ID: Brooke Chu is a 45 y.o. female.  Visit type: Follow up telephone visit    A telephone visit (audio only) between the patient (at the originating site) and the provider (at the distant site) was utilized to provide this telehealth service.   Verbal consent was requested and obtained from Brooke Chu on this date, 02/12/25 for a telehealth visit.       Current Therapy  Treatment Plan:  Octreotide [place], Every 28 Days      ONCOLOGIC HISTORY  45-year-old woman with a several year history of neck pain on the Rt side and for exploration they did full imaging include CT neck, chest and AP on 06/30/2020  which showed a large lobulated heterogeneously enhancing mass lesion involving segments 7 and 8 of the right hepatic lobe measuring approximately 13.2 x 10.5 x 9.7 cm in size. 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   MRI showed large hypervascular heterogeneously enhancing hepatic mass lesion in segment 7 and 8 and multiple other satellite hepatic lesions involving right and left hepatic lobes. Stable subcentimeter zayda hepatis lymph nodes. No other significant lymphadenopathy  or free intraperitoneal fluid.  PET-NET showed Dominant centrally necrotic somatostatin receptor expressing mass within hepatic segments 7 and 8. Additional satellite lesions in the bilateral hepatic lobes as described above. Somatostatin avid Left internal iliac lymph nodes. No additional  sites of abnormal Ga-68 dotatate uptake suggestive of malignancy identified.  PET-FDG didn't show any extra glucose uptake  Colonoscopy and SB enteroscopy didn't show any primary lesion. Symptoms of diarrhea and intermittent flushing s/p 1 dose of lanreotide after which she had severe bloating and abdominal pain as well as diarrhea   10/27: She had partial hepatectomy (segment 3, 5, 6), liver  ablation (segment 5, 3), cholecystectomy   She recovered well with surgery. She was tested positive for COVID  and only symptoms she has was loss of taste and stuffy nose.   She started on lanreotide but d/t multiple side effects including gas, bloating, and some hypoglycemia episodes she was switched to Octreotide 30 mg in December of 2021  MRI abd/pelvis 1/7/2021 appears stable but limited imaging d/t inability to do with contrast as IV infiltrated during scan per patient.     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 No abdominal pain, no nausea, or vomiting.    ROS  All 14 systems have been reviewed and were negative except for the HPI.      OBJECTIVE:    VS:  /58 (BP Location: Left arm, Patient Position: Sitting, BP Cuff Size: Adult)   Pulse 60   Temp 35.8 °C (96.4 °F) (Temporal)   Resp 18   Wt 89.9 kg (198 lb 3.1 oz)   SpO2 100%   BMI 31.33 kg/m²   Weight:   Vitals:    02/12/25 1457   Weight: 89.9 kg (198 lb 3.1 oz)         BSA: 2.06 meters squared    Physical Exam  General: Appears well and in NAD  Eyes: PERRL, EOMI, clear sclera  ENMT: mucous membranes moist, no apparent injury, no lesions seen  Head/Neck: Neck supple, no apparent injury, thyroid without mass or tenderness, No JVD, trachea midline,   Thorax: Patent airways, CTAB, normal breath sounds with good chest expansion, thorax symmetric  Cardiovascular: Regular, rate and rhythm, no murmurs, 2+ equal pulses of the extremities, normal S 1and S 2  Gastrointestinal: Nondistended, soft, non-tender, no rebound tenderness or guarding, no masses palpable, no organomegaly, +BS  Musculoskeletal: ROM intact, no joint swelling, normal strength  Extremities: normal extremities, no cyanosis edema, contusions or wounds, no clubbing  Neurological: alert and oriented x3, intact senses, motor, response and reflexes, normal  strength  Lymphatic: No significant lymphadenopathy  Psychological: Appropriate mood and behavior  Skin: Warm and dry, no lesions, no rashes      Diagnostic Results     Comprehensive Metabolic Panel              Component  Ref Range & Units 2 wk ago  (1/24/24) 4 mo ago  (10/6/23) 7 mo ago  (7/12/23) 10 mo ago  (3/29/23) 1 yr ago  (10/24/22) 1 yr ago  (8/27/22) 1 yr ago  (7/20/22)   Glucose  74 - 99 mg/dL 90 112 High  94 121 High  103 High  97 98   Sodium  136 - 145 mmol/L 140 137 143 140 139 137 136   Potassium  3.5 - 5.3 mmol/L 4.8 4.1 4.1 4.3 3.8 4.1 4.0   Chloride  98 - 107 mmol/L 106 105 108 High  105 108 High  107 103   Bicarbonate  21 - 32 mmol/L 25 25 28 28 23 21 26   Anion Gap  10 - 20 mmol/L 14 11 11 11 12 13 11   Urea Nitrogen  6 - 23 mg/dL 11 15 13 9 10 12 11   Creatinine  0.50 - 1.05 mg/dL 0.73 0.77 0.81 0.63 0.63 0.61 0.74   eGFR  >60 mL/min/1.73m*2 >90 >90 CM        Comment: Calculations of estimated GFR are performed using the 2021 CKD-EPI Study Refit equation without the race variable for the IDMS-Traceable creatinine methods.  https://jasn.asnjournals.org/content/early/2021/09/22/ASN.2793137082   Calcium  8.6 - 10.3 mg/dL 8.9 9.1 9.4 9.4 R 8.7 8.5 Low  8.7   Albumin  3.4 - 5.0 g/dL 4.1 4.1 4.2 4.2 4.0  4.1   Alkaline Phosphatase  33 - 110 U/L 80 73 70 68 63  65   Total Protein  6.4 - 8.2 g/dL 6.5 7.3 7.1 7.0 6.8  7.1   AST  9 - 39 U/L 16 19 14 13 13  20   Bilirubin, Total  0.0 - 1.2 mg/dL 0.4 0.4 0.3 0.4 0.5  0.4   ALT  7 - 45 U/L 15 19 CM 11 CM            Contains abnormal data CBC and Auto Differential                Component  Ref Range & Units 2 wk ago  (1/24/24) 4 mo ago  (10/6/23) 7 mo ago  (7/12/23) 10 mo ago  (3/29/23) 1 yr ago  (10/24/22) 1 yr ago  (7/20/22) 1 yr ago  (7/8/22)   WBC  4.4 - 11.3 x10*3/uL 11.8 High  7.5 9.5 R 7.9 R 7.4 R 6.8 R 9.0 R   nRBC  0.0 - 0.0 /100 WBCs 0.0 0.0        RBC  4.00 - 5.20 x10*6/uL 4.95 4.80 4.73 R 4.79 R 4.55 R 4.54 R 4.71 R   Hemoglobin  12.0 - 16.0 g/dL  14.7 14.6 14.2 14.1 13.6 13.4 14.0   Hematocrit  36.0 - 46.0 % 45.4 44.1 42.5 42.4 41.6 42.1 43.1   MCV  80 - 100 fL 92 92 90 89 91 93 92   MCH  26.0 - 34.0 pg 29.7 30.4        MCHC  32.0 - 36.0 g/dL 32.4 33.1 33.4 33.3 32.7 31.8 Low  32.5   RDW  11.5 - 14.5 % 13.0 12.9 13.5 13.5 13.3 13.2 12.9   Platelets  150 - 450 x10*3/uL 244 229 205 R 217 R 183 R 191 R 220 R   Neutrophils %  40.0 - 80.0 % 67.3 55.6 63.3 55.6 56.8 54.0 64.2   Immature Granulocytes %, Automated  0.0 - 0.9 % 0.4 0.3 CM 0.5 CM 0.4 CM 0.1 CM 0.6 CM 0.2 CM   Comment: Immature Granulocyte Count (IG) includes promyelocytes, myelocytes and metamyelocytes but does not include bands. Percent differential counts (%) should be interpreted in the context of the absolute cell counts (cells/UL).   Lymphocytes %  13.0 - 44.0 % 23.3 33.8 26.2 33.2 31.8 34.2 25.6 CM   Monocytes %  2.0 - 10.0 % 6.9 7.5 7.2 7.9 8.4 7.9 7.6   Eosinophils %  0.0 - 6.0 % 1.6 2.0 2.3 2.3 2.4 2.3 2.0   Basophils %  0.0 - 2.0 % 0.5 0.8 0.5 0.6 0.5 1.0 0.4   Neutrophils Absolute  1.20 - 7.70 x10*3/uL 7.90 High  4.16 CM 6.02 R 4.41 R 4.20 R 3.67 R 5.78 R   Comment: Percent differential counts (%) should be interpreted in the context of the absolute cell counts (cells/uL).   Immature Granulocytes Absolute, Automated  0.00 - 0.70 x10*3/uL 0.05 0.02        Lymphocytes Absolute  1.20 - 4.80 x10*3/uL 2.74 2.53 2.49 R 2.64 R 2.35 R 2.33 R 2.30 R   Monocytes Absolute  0.10 - 1.00 x10*3/uL 0.81 0.56 0.69 R 0.63 R 0.62 R 0.54 R 0.68 R   Eosinophils Absolute  0.00 - 0.70 x10*3/uL 0.19 0.15 0.22 R 0.18 R 0.18 R 0.16 R 0.18 R   Basophils Absolute  0.00 - 0.10 x10*3/uL 0.06 0.06 0.05 R 0.05 R 0.04 R 0.07 R 0.04 R   MPV  11.1 R             5-Hydroxyindoleacetic Acid,Plasma          Component  Ref Range & Units 2 wk ago 4 mo ago 10 mo ago   5-Hydroxyindoleacetic Acid (5-HIAA),Plasma  ng/mL 5.7 6.2 CM 3.3 CM          NM PET CT dotatate  Narrative: Interpreted By:  Jose Burk,  Kuldip Ham    STUDY:  NM PET CT DOTATATE;  10/7/2024 12:51 pm      INDICATION:  Signs/Symptoms:NET Restaging. 44-year-old female with a history of  well-differentiated neuroendocrine tumor with liver metastasis.  Patient is status post partial right hepatectomy, liver ablation of  segment 5 and 3 and cholecystectomy. MRI of the abdomen obtained on  09/30/2024 demonstrating concern for new enhancing hepatic lesions.      ,C7A.8 Other malignant neuroendocrine tumors      COMPARISON:  PET-CT: 02/19/2024      ACCESSION NUMBER(S):  [id number]      ORDERING CLINICIAN:  LIGIA MOON      TECHNIQUE:  DIVISION OF NUCLEAR MEDICINE  POSITRON EMISSION TOMOGRAPHY (PET-CT)      The patient received an intravenous dose of 5.8 mCi of Ga-68  DOTATATE. Positron emission tomographic (PET) images from skull base  to mid-thigh were then acquired after a one hour delay. Also acquired  was a contemporaneous low dose non-contrast CT scan performed for  attenuation correction of PET images and anatomic localization.  The  PET and CT images were digitally fused for display.  All images were  acquired on a combined PET-CT scanner unit.  Some areas of FDG  accumulation may be described in standardized uptake value (SUV)  units.      CODING:  Subsequent Treatment Strategy (PS)      CALIBRATION:  Dose Injection-to-Scan Interval (mins): 75 min  Mediastinal bloodpool SUV (normal 1.5-2.5): 1      FINDINGS:  NECK:  *Physiologic radiotracer uptake is seen in the pituitary gland.  *No evidence of paranasal sinus disease.  *No Ga-68 dotatate avid lymphadenopathy in the head or neck.      CHEST:  * No concerning pulmonary nodule.  * No Ga-68 dotatate avid lymphadenopathy in the thorax.      ABDOMEN AND PELVIS:  *Status post segmentectomy with a persistent small somatostatin  receptor expressing lesion in the apical most portion of segment  8/dome at the surgical bed margin noted which remains similar  compared to prior imaging. *There is interval resolution of  the  hepatic somatostatin receptor expressing segment 5 lesion status post  ablation. Additional post ablated changes are also noted in the  hepatic segment 8 with interval treatment of the lesion within the  area previously seen on PET-CT. *An additional smaller somatostatin  receptor expressing lesion is also seen on hepatic segment 4B. *No  Ga-68 dotatate avid lymphadenopathy in the abdomen or pelvis  *Physiologic Ga-68 dotatate uptake is seen in the liver, spleen,  kidneys, adrenals and bowel.      MUSCULOSKELETAL:  *No concerning Ga-68 dotatate avid bone lesion throughout the axial  and appendicular skeleton.      Impression: 1. Status post right partial segmentectomy with persistent Ga68  DOTATATE avid lesion in the hepatic segment 8 adjacent to the  surgical bed as well as similar appearance of Ga68 DOTATATE avid  lesion in the hepatic segment 4B with mildly increased radiotracer  uptake in comparison to prior study. Findings likely representing  residual viable disease. Status post ablation at hepatic segments 5  and 8. Please correlate with recent MR for better assessment.  2. No PET evidence of FDG avid metastasis outside of liver.      I personally reviewed the image(s) / study and agree with the  findings and interpretation as stated. This study was interpreted at  [facility].      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 Liver biopsy showed WELL DIFFERENTIATED NEUROENDOCRINE TUMOR, GRADE 2 with Ki67 was 5.8%   and positive for CDX2 but negative for serotonin which is unusual for small bowel tumors. The possibility of origin in pancreas and duodenum should be excluded.   MRI showed large hypervascular heterogeneously enhancing hepatic mass lesion in segment 7 and 8 and multiple other satellite hepatic lesions involving right and left hepatic lobes. Stable subcentimeter zayda hepatis lymph nodes. No other significant lymphadenopathy  or free intraperitoneal fluid.  PET-NET showed Dominant centrally necrotic somatostatin receptor expressing mass within hepatic segments 7 and 8. Additional satellite lesions in the bilateral hepatic lobes as described above. Somatostatin avid Left internal iliac lymph nodes. No additional  sites of abnormal Ga-68 dotatate uptake suggestive of malignancy identified.  PET-FDG didnt show any extra glucose uptake  Colonoscopy and SB enteroscopy didn't show any primary lesion  10/27/2020: She had partial hepatectomy (segment 3, 5, 6), liver ablation (segment 5, 3), cholecystectomy and pth confirmed G2 well diff NET (kI67 3.1%)    She recovered well with surgery. She was tested positive for COVID  and only symptoms she has was loss of taste and stuffy nose.   04/26/2021: Restaging CT scan showed post surgical changes and postablation changes in the liver with no new lesions  MRI showed At least 4 foci of restricted diffusion are identified in segments 8 and 3 without correlating abnormality on other sequences. However 2 of these foci correlate with hypervascular foci and a prior CT scan.  Findings are nonspecific, however  metastatic foci cannot be excluded  MRI showed stable disease on 08/16/2021  PET-Ga68 on 11/2021 showed multiple small (3-4) somatostatin avid foci are noted scattered throughout the hepatic  parenchyma which appear stable number and relative intensity compared to the prior exam   Octreotide 30 mg started 12/2021 as she was not tolerating lanreotide well with multiple side  "effects.  MRI Brain 12/13 done for recurrent headaches/ to rule out mets- Scan WNL  MRI abd/pelvis 1/7/2021 appears stable but limited imaging d/t inability to do with contrast as IV infiltrated during scan per patient.   Repeated MRI on 03/2022: Showed stable disease with one new liver lesion   Repeated MRI on 07/23/2022: Showed stable disease   PET scan on 10/14/2022 shows stable disease  MRI AP on 3/6/2023 shows stable disease. 5HIAA 3.3 on 3/29/23  MRI AP on 7/3/2023 shows stable disease  MRI AP on 10/14/23 shows: \"Status post partial right hepatectomy. Stable indeterminate hepatic arterial enhancing foci as detailed above. No new suspicious lesions.\"  MRI AP on 1/2/24 shows: Few small subcentimeter arterially enhancing foci without signal abnormality on other postcontrast sequences or anatomic sequences  Patient presented to TB with recommendations: \"Liver foci are very small and not sure if these are new NETs. MRI w eovist to compare with MRI on 10/2023 and PET-CU64. Resume SSA and if PET-CU64 only showed liver lesions will proceed with surgery vs ablation\"  PET-CU64 showed stable multiple somatostatin avid lesions are scattered throughout the liver  MRI w eovist showed enhancing hepatic segment 8 lesion and stable arterial enhancing hepatic segment 5 lesion  She had MWA on 04/22/2024  Restaging MRI on 06/2024: Showed New ablation sites involving the liver. One of the previous suspicious diffusion restricting hepatic lesions is no longer identified. A subcentimeter arterial enhancing nodule in segment VIII appears similar to prior exam  We held SSA due to abdominal pain, bloating and fatigue   09/30/2024: Restaging MRI showed previous arterial enhancing observation without restricted diffusion in hepatic segment VIII is not significantly changed. Three  additional subcentimeter arterial enhancing observations in the liver  without restricted diffusion appear new since prior exam.  10/2024; PET-Ga68 showed " persistent Ga68 DOTATATE avid lesion in the hepatic segment 4B and 8.   11/06/2024: MRI liver demonstrated small subcentimeter  hypervascular foci throughout the liver   She is not eligible for histotripsy or surgical resection given many of them and they are small  Today: She had intermittent flushing and RUQ pain. No nausea or vomiting or diarrhea.     Plan:    -Will resume SSA with Octreotide 30 mg given she had side effects with lanreotide   -RTC after scans for follow up in 3 months with MRI AP: 04/2025      Sotero Arango M.D.   and Director of the Neuroendocrine Tumor Program  Gastrointestinal Medical Oncology  Department of Hematology and Oncology  [facility], [place] [address]  Phone (Office): [phone]  Fax:  [phone]   Email: [email or web address]  Learn more about [facility] Comprehensive Neuroendocrine Tumor Program: Click Here  Learn more about [place] [employer]: [email or web address]

## 2025-02-13 ENCOUNTER — INFUSION (OUTPATIENT)
Dept: HEMATOLOGY/ONCOLOGY | Facility: HOSPITAL | Age: 45
End: 2025-02-13
Payer: COMMERCIAL

## 2025-02-13 VITALS
DIASTOLIC BLOOD PRESSURE: 79 MMHG | SYSTOLIC BLOOD PRESSURE: 144 MMHG | HEART RATE: 65 BPM | TEMPERATURE: 97.2 F | OXYGEN SATURATION: 100 % | RESPIRATION RATE: 18 BRPM

## 2025-02-13 DIAGNOSIS — C7A.8 NEUROENDOCRINE CANCER: ICD-10-CM

## 2025-02-13 PROCEDURE — 2500000004 HC RX 250 GENERAL PHARMACY W/ HCPCS (ALT 636 FOR OP/ED): Mod: JZ,TB | Performed by: INTERNAL MEDICINE

## 2025-02-13 PROCEDURE — 96372 THER/PROPH/DIAG INJ SC/IM: CPT

## 2025-02-13 RX ORDER — DIPHENHYDRAMINE HYDROCHLORIDE 50 MG/ML
50 INJECTION INTRAMUSCULAR; INTRAVENOUS AS NEEDED
OUTPATIENT
Start: 2025-03-13

## 2025-02-13 RX ORDER — OCTREOTIDE ACETATE,MI-SPHERES 30 MG
30 VIAL (EA) INTRAMUSCULAR ONCE
Status: COMPLETED | OUTPATIENT
Start: 2025-02-13 | End: 2025-02-13

## 2025-02-13 RX ORDER — EPINEPHRINE 0.3 MG/.3ML
0.3 INJECTION SUBCUTANEOUS EVERY 5 MIN PRN
OUTPATIENT
Start: 2025-03-13

## 2025-02-13 RX ORDER — ALBUTEROL SULFATE 0.83 MG/ML
3 SOLUTION RESPIRATORY (INHALATION) AS NEEDED
OUTPATIENT
Start: 2025-03-13

## 2025-02-13 RX ORDER — FAMOTIDINE 10 MG/ML
20 INJECTION INTRAVENOUS ONCE AS NEEDED
OUTPATIENT
Start: 2025-03-13

## 2025-02-13 RX ORDER — OCTREOTIDE ACETATE,MI-SPHERES 30 MG
30 VIAL (EA) INTRAMUSCULAR ONCE
OUTPATIENT
Start: 2025-03-13

## 2025-02-13 RX ADMIN — OCTREOTIDE ACETATE 30 MG: KIT at 11:43

## 2025-02-13 ASSESSMENT — PAIN SCALES - GENERAL: PAINLEVEL_OUTOF10: 0-NO PAIN

## 2025-03-11 ENCOUNTER — OFFICE VISIT (OUTPATIENT)
Dept: URGENT CARE | Age: 45
End: 2025-03-11
Payer: COMMERCIAL

## 2025-03-11 ENCOUNTER — ANCILLARY PROCEDURE (OUTPATIENT)
Dept: URGENT CARE | Age: 45
End: 2025-03-11
Payer: COMMERCIAL

## 2025-03-11 VITALS
OXYGEN SATURATION: 98 % | HEIGHT: 66 IN | SYSTOLIC BLOOD PRESSURE: 140 MMHG | DIASTOLIC BLOOD PRESSURE: 82 MMHG | HEART RATE: 54 BPM | TEMPERATURE: 97.8 F | RESPIRATION RATE: 16 BRPM | BODY MASS INDEX: 32.14 KG/M2 | WEIGHT: 200 LBS

## 2025-03-11 DIAGNOSIS — R05.9 COUGH, UNSPECIFIED TYPE: ICD-10-CM

## 2025-03-11 DIAGNOSIS — Z20.822 SUSPECTED COVID-19 VIRUS INFECTION: ICD-10-CM

## 2025-03-11 DIAGNOSIS — J02.9 SORE THROAT: ICD-10-CM

## 2025-03-11 DIAGNOSIS — J01.00 ACUTE NON-RECURRENT MAXILLARY SINUSITIS: Primary | ICD-10-CM

## 2025-03-11 LAB
POC RAPID INFLUENZA A: NEGATIVE
POC RAPID INFLUENZA B: NEGATIVE
POC RAPID STREP: NEGATIVE
POC SARS-COV-2 AG BINAX: NORMAL

## 2025-03-11 PROCEDURE — 71046 X-RAY EXAM CHEST 2 VIEWS: CPT

## 2025-03-11 RX ORDER — AMOXICILLIN 875 MG/1
875 TABLET, FILM COATED ORAL 2 TIMES DAILY
Qty: 14 TABLET | Refills: 0 | Status: SHIPPED | OUTPATIENT
Start: 2025-03-11 | End: 2025-03-18

## 2025-03-11 ASSESSMENT — ENCOUNTER SYMPTOMS
SHORTNESS OF BREATH: 0
HEADACHES: 1
FEVER: 0
DIZZINESS: 0
VOMITING: 0
CHILLS: 0
SINUS PRESSURE: 1
ABDOMINAL PAIN: 0
DYSURIA: 0
CHEST TIGHTNESS: 0
SORE THROAT: 1
FATIGUE: 0
DIARRHEA: 1
WHEEZING: 0
STRIDOR: 0
COUGH: 1

## 2025-03-11 NOTE — PATIENT INSTRUCTIONS
Discharge instructions:    Please follow up with your Primary Care Physician within the next 5-7 days.    Because of your immunocompromised status we will cover you for sinusitis with amoxicillin.  Please inform whoever is prescribing octreotide infusions that you are on amoxicillin for sinusitis before receiving another treatment.    It is important to take prescriptions as prescribed and complete all antibiotics.     If your symptoms worsen you are instructed to immediately go to the emergency room for reevaluation and further assessment.    If you develop any chest pain, SOB, or difficulty breathing you are instructed to go to the emergency room for reevaluation.    All discharge instructions will be provided and explained to the patient at discharge.    If you have any questions regarding your treatment plan please call the [facility] urgent care clinic.

## 2025-03-11 NOTE — PROGRESS NOTES
Subjective   Patient ID: Brooke Chu is a 45 y.o. female. They present today with a chief complaint of Sore Throat, Headache (Symptoms since saturday), and Nasal Congestion.    History of Present Illness  Patient is a 45-year-old female presenting to the clinic with complaints of nasal congestion, postnasal drip, sore throat, sinus pressure, intermittent sinus headaches.  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  Patient states she would like evaluated to assess if she needs an antibiotic. Symptoms for 4-5 days now.  Patient is on immunosuppressive medication octreotide for neuroendocrine tumors.  Patient denies any chest pain or shortness of breath or difficulty breathing.  Patient states she did start developing a cough today.  Patient denies any abdominal pain, vomiting.  Patient had 1 episode of diarrhea around 4 days.  Has not had any since.  Patient denies any chest pain, shortness of breath, difficulty breathing, burning with urination, vaginal discharge.      History provided by:  Patient  Sore Throat   Associated symptoms include congestion, coughing, diarrhea and headaches. Pertinent negatives include no abdominal pain, ear discharge, ear pain, shortness of breath, stridor or vomiting.   Headache  Associated symptoms: congestion, cough, diarrhea, drainage, sinus pressure and sore throat    Associated symptoms: no abdominal pain, no dizziness, no ear pain, no fatigue, no fever and no vomiting        Past Medical History  Allergies as of 03/11/2025 - Reviewed 03/11/2025   Allergen Reaction Noted    Corticosteroids (glucocorticoids) Unknown 06/06/2024    Methylprednisolone Other and Unknown 08/30/2023    Other Unknown 08/30/2023       (Not in a hospital admission)       Past Medical History:   Diagnosis Date    Chronic headaches     Dizziness     Fibroids     Fibroids     Hypoglycemia     Liver mass     Neuroendocrine cancer     Other bursal cyst, unspecified wrist      Synovial cyst of wrist    Ovarian cyst     Ovarian cyst     Streptococcus pharyngitis 03/19/2024       Past Surgical History:   Procedure Laterality Date    CERVICAL FUSION      CHOLECYSTECTOMY      COLONOSCOPY      CT GUIDED PERCUTANEOUS PERITONEAL OR RETROPERITONEAL FLUID COLLECTION DRAINAGE  11/03/2020    CT GUIDED PERCUTANEOUS PERITONEAL OR RETROPERITONEAL FLUID COLLECTION DRAINAGE 11/3/2020 [facility] [place]    OTHER SURGICAL HISTORY  08/24/2022    Neck surgery    OTHER SURGICAL HISTORY  11/14/2020    Hepatectomy partial    OTHER SURGICAL HISTORY  11/14/2020    Liver tumor ablation    OTHER SURGICAL HISTORY      Liver biospy    SMALL BOWEL ENTEROSCOPY      TUBAL LIGATION      US GUIDED NEEDLE LIVER BIOPSY  07/23/2020    US GUIDED NEEDLE LIVER BIOPSY 7/23/2020 [facility] [place]    WRIST SURGERY          reports that she has quit smoking. Her smoking use included cigarettes. She has never been exposed to tobacco smoke. She has never used smokeless tobacco. She reports that she does not drink alcohol and does not use drugs.    Review of Systems  Review of Systems   Constitutional:  Negative for chills, fatigue and fever.   HENT:  Positive for congestion, postnasal drip, sinus pressure and sore throat. Negative for ear discharge and ear pain.    Respiratory:  Positive for cough. Negative for chest tightness, shortness of breath, wheezing and stridor.    Cardiovascular:  Negative for chest pain.   Gastrointestinal:  Positive for diarrhea. Negative for abdominal pain and vomiting.   Genitourinary:  Negative for dysuria and vaginal discharge.   Neurological:  Positive for headaches. Negative for dizziness.                                  Objective    Vitals:    03/11/25 0805 03/11/25 0832   BP: (!) 149/97 140/82   BP Location: Left arm Right arm   Patient Position: Sitting Sitting   BP Cuff Size: Adult    Pulse: 54    Resp: 16    Temp: 36.6 °C (97.8 °F)    TempSrc: Oral    SpO2: 98%    Weight: 90.7 kg (200 lb)   "  Height: 1.676 m (5' 6\")      Patient's last menstrual period was 03/10/2025.    Physical Exam  Vitals reviewed.   Constitutional:       General: She is awake. She is not in acute distress.     Appearance: Normal appearance. She is well-developed. She is not ill-appearing or toxic-appearing.   HENT:      Head: Normocephalic and atraumatic.      Right Ear: Tympanic membrane, ear canal and external ear normal.      Left Ear: Tympanic membrane, ear canal and external ear normal.      Nose: Congestion present.      Mouth/Throat:      Mouth: Mucous membranes are moist.      Pharynx: Oropharynx is clear. Uvula midline. No oropharyngeal exudate or posterior oropharyngeal erythema.      Tonsils: No tonsillar exudate or tonsillar abscesses.   Cardiovascular:      Rate and Rhythm: Normal rate and regular rhythm.      Heart sounds: Normal heart sounds, S1 normal and S2 normal. No murmur heard.     No friction rub. No gallop.   Pulmonary:      Effort: Pulmonary effort is normal. No respiratory distress.      Breath sounds: Normal breath sounds. No stridor. No wheezing, rhonchi or rales.   Skin:     General: Skin is warm.   Neurological:      General: No focal deficit present.      Mental Status: She is alert and oriented to person, place, and time. Mental status is at baseline.      Gait: Gait is intact.   Psychiatric:         Mood and Affect: Mood normal.         Behavior: Behavior normal. Behavior is cooperative.         Procedures    Point of Care Test & Imaging Results from this visit  Results for orders placed or performed in visit on 03/11/25   POCT Covid-19 Rapid Antigen   Result Value Ref Range    POC JIGAR-COV-2 AG  Presumptive negative test for SARS-CoV-2 (no antigen detected)     Presumptive negative test for SARS-CoV-2 (no antigen detected)   POCT Influenza A/B manually resulted   Result Value Ref Range    POC Rapid Influenza A Negative Negative    POC Rapid Influenza B Negative Negative   POCT rapid strep A manually " resulted   Result Value Ref Range    POC Rapid Strep Negative Negative      No results found.    Diagnostic study results (if any) were reviewed by [facility].    Assessment/Plan   Allergies, medications, history, and pertinent labs/EKGs/Imaging reviewed by Anderson Guillen PA-C.     Medical Decision Making:  Patient is a 45-year-old female presenting to the clinic with complaints of nasal congestion, postnasal drip, sore throat, sinus pressure, intermittent sinus headaches.  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  Patient states she would like evaluated to assess if she needs an antibiotic. Symptoms for 4-5 days now.  Patient is on immunosuppressive medication octreotide for neuroendocrine tumors.  Patient denies any chest pain or shortness of breath or difficulty breathing.  Patient states she did start developing a cough today.  Patient denies any abdominal pain, vomiting.  Patient had 1 episode of diarrhea around 4 days.  Has not had any since.  Patient denies any chest pain, shortness of breath, difficulty breathing, burning with urination, vaginal discharge.  Physical examination as above benign.  Patient's COVID, flu, strep testing negative.  Vital signs in the clinic are stable and within normal limits.  Given patient is immunosuppressed with new onset of cough today will add chest x-ray for evaluation.  Chest x-ray impression: No radiographic evidence of acute cardiopulmonary process. Discharge instructions: Please follow up with your Primary Care Physician within the next 5-7 days. Because of your immunocompromised status we will cover you for sinusitis with amoxicillin.  Please inform whoever is prescribing octreotide infusions that you are on amoxicillin for sinusitis before receiving another treatment. It is important to take prescriptions as prescribed and complete all antibiotics. If your symptoms worsen you are instructed to immediately go to the emergency  room for reevaluation and further assessment. If you develop any chest pain, SOB, or difficulty breathing you are instructed to go to the emergency room for reevaluation. All discharge instructions will be provided and explained to the patient at discharge. If you have any questions regarding your treatment plan please call the [facility] urgent care clinic. Attending physician okay with plan of care    Orders and Diagnoses  Diagnoses and all orders for this visit:  Acute non-recurrent maxillary sinusitis  Suspected COVID-19 virus infection  -     POCT Covid-19 Rapid Antigen  Sore throat  -     POCT Influenza A/B manually resulted  -     POCT rapid strep A manually resulted  Cough, unspecified type  -     XR chest 2 views; Future      Medical Admin Record      Patient disposition: Home    Electronically signed by [facility]  8:34 AM

## 2025-03-13 ENCOUNTER — INFUSION (OUTPATIENT)
Dept: HEMATOLOGY/ONCOLOGY | Facility: HOSPITAL | Age: 45
End: 2025-03-13
Payer: COMMERCIAL

## 2025-03-13 VITALS
SYSTOLIC BLOOD PRESSURE: 131 MMHG | WEIGHT: 204.81 LBS | HEART RATE: 66 BPM | DIASTOLIC BLOOD PRESSURE: 86 MMHG | TEMPERATURE: 98.2 F | OXYGEN SATURATION: 98 % | RESPIRATION RATE: 16 BRPM | BODY MASS INDEX: 33.06 KG/M2

## 2025-03-13 DIAGNOSIS — C7A.8 NEUROENDOCRINE CANCER: ICD-10-CM

## 2025-03-13 PROCEDURE — 2500000004 HC RX 250 GENERAL PHARMACY W/ HCPCS (ALT 636 FOR OP/ED): Mod: JZ,TB | Performed by: INTERNAL MEDICINE

## 2025-03-13 PROCEDURE — 96372 THER/PROPH/DIAG INJ SC/IM: CPT

## 2025-03-13 RX ORDER — OCTREOTIDE ACETATE,MI-SPHERES 30 MG
30 VIAL (EA) INTRAMUSCULAR ONCE
Status: COMPLETED | OUTPATIENT
Start: 2025-03-13 | End: 2025-03-13

## 2025-03-13 RX ORDER — DIPHENHYDRAMINE HYDROCHLORIDE 50 MG/ML
50 INJECTION INTRAMUSCULAR; INTRAVENOUS AS NEEDED
Status: DISCONTINUED | OUTPATIENT
Start: 2025-03-13 | End: 2025-03-13 | Stop reason: HOSPADM

## 2025-03-13 RX ORDER — EPINEPHRINE 0.3 MG/.3ML
0.3 INJECTION SUBCUTANEOUS EVERY 5 MIN PRN
Status: DISCONTINUED | OUTPATIENT
Start: 2025-03-13 | End: 2025-03-13 | Stop reason: HOSPADM

## 2025-03-13 RX ORDER — EPINEPHRINE 0.3 MG/.3ML
0.3 INJECTION SUBCUTANEOUS EVERY 5 MIN PRN
OUTPATIENT
Start: 2025-04-10

## 2025-03-13 RX ORDER — ALBUTEROL SULFATE 0.83 MG/ML
3 SOLUTION RESPIRATORY (INHALATION) AS NEEDED
OUTPATIENT
Start: 2025-04-10

## 2025-03-13 RX ORDER — FAMOTIDINE 10 MG/ML
20 INJECTION, SOLUTION INTRAVENOUS ONCE AS NEEDED
OUTPATIENT
Start: 2025-04-10

## 2025-03-13 RX ORDER — ALBUTEROL SULFATE 0.83 MG/ML
3 SOLUTION RESPIRATORY (INHALATION) AS NEEDED
Status: DISCONTINUED | OUTPATIENT
Start: 2025-03-13 | End: 2025-03-13 | Stop reason: HOSPADM

## 2025-03-13 RX ORDER — DIPHENHYDRAMINE HYDROCHLORIDE 50 MG/ML
50 INJECTION INTRAMUSCULAR; INTRAVENOUS AS NEEDED
OUTPATIENT
Start: 2025-04-10

## 2025-03-13 RX ORDER — FAMOTIDINE 10 MG/ML
20 INJECTION, SOLUTION INTRAVENOUS ONCE AS NEEDED
Status: DISCONTINUED | OUTPATIENT
Start: 2025-03-13 | End: 2025-03-13 | Stop reason: HOSPADM

## 2025-03-13 RX ORDER — OCTREOTIDE ACETATE,MI-SPHERES 30 MG
30 VIAL (EA) INTRAMUSCULAR ONCE
OUTPATIENT
Start: 2025-04-10

## 2025-03-13 RX ADMIN — OCTREOTIDE ACETATE 30 MG: KIT at 14:20

## 2025-03-13 ASSESSMENT — ENCOUNTER SYMPTOMS
OCCASIONAL FEELINGS OF UNSTEADINESS: 0
DEPRESSION: 0
LOSS OF SENSATION IN FEET: 0

## 2025-03-13 ASSESSMENT — PAIN SCALES - GENERAL: PAINLEVEL_OUTOF10: 0-NO PAIN

## 2025-03-13 ASSESSMENT — COLUMBIA-SUICIDE SEVERITY RATING SCALE - C-SSRS
6. HAVE YOU EVER DONE ANYTHING, STARTED TO DO ANYTHING, OR PREPARED TO DO ANYTHING TO END YOUR LIFE?: NO
2. HAVE YOU ACTUALLY HAD ANY THOUGHTS OF KILLING YOURSELF?: NO
1. IN THE PAST MONTH, HAVE YOU WISHED YOU WERE DEAD OR WISHED YOU COULD GO TO SLEEP AND NOT WAKE UP?: NO

## 2025-04-10 ENCOUNTER — INFUSION (OUTPATIENT)
Dept: HEMATOLOGY/ONCOLOGY | Facility: HOSPITAL | Age: 45
End: 2025-04-10
Payer: COMMERCIAL

## 2025-04-10 VITALS
RESPIRATION RATE: 16 BRPM | SYSTOLIC BLOOD PRESSURE: 134 MMHG | HEART RATE: 72 BPM | TEMPERATURE: 97.7 F | OXYGEN SATURATION: 99 % | DIASTOLIC BLOOD PRESSURE: 71 MMHG

## 2025-04-10 DIAGNOSIS — C7A.8 NEUROENDOCRINE CANCER: ICD-10-CM

## 2025-04-10 LAB
ALBUMIN SERPL BCP-MCNC: 4.4 G/DL (ref 3.4–5)
ALP SERPL-CCNC: 69 U/L (ref 33–110)
ALT SERPL W P-5'-P-CCNC: 12 U/L (ref 7–45)
ANION GAP SERPL CALC-SCNC: 12 MMOL/L (ref 10–20)
AST SERPL W P-5'-P-CCNC: 15 U/L (ref 9–39)
BASOPHILS # BLD AUTO: 0.05 X10*3/UL (ref 0–0.1)
BASOPHILS NFR BLD AUTO: 0.7 %
BILIRUB SERPL-MCNC: 0.4 MG/DL (ref 0–1.2)
BUN SERPL-MCNC: 13 MG/DL (ref 6–23)
CALCIUM SERPL-MCNC: 9.1 MG/DL (ref 8.6–10.3)
CHLORIDE SERPL-SCNC: 107 MMOL/L (ref 98–107)
CO2 SERPL-SCNC: 23 MMOL/L (ref 21–32)
CREAT SERPL-MCNC: 0.7 MG/DL (ref 0.5–1.05)
EGFRCR SERPLBLD CKD-EPI 2021: >90 ML/MIN/1.73M*2
EOSINOPHIL # BLD AUTO: 0.14 X10*3/UL (ref 0–0.7)
EOSINOPHIL NFR BLD AUTO: 2.1 %
ERYTHROCYTE [DISTWIDTH] IN BLOOD BY AUTOMATED COUNT: 12.9 % (ref 11.5–14.5)
GLUCOSE SERPL-MCNC: 94 MG/DL (ref 74–99)
HCT VFR BLD AUTO: 42.4 % (ref 36–46)
HGB BLD-MCNC: 13.8 G/DL (ref 12–16)
IMM GRANULOCYTES # BLD AUTO: 0.01 X10*3/UL (ref 0–0.7)
IMM GRANULOCYTES NFR BLD AUTO: 0.1 % (ref 0–0.9)
LYMPHOCYTES # BLD AUTO: 2.78 X10*3/UL (ref 1.2–4.8)
LYMPHOCYTES NFR BLD AUTO: 41.4 %
MCH RBC QN AUTO: 29.9 PG (ref 26–34)
MCHC RBC AUTO-ENTMCNC: 32.5 G/DL (ref 32–36)
MCV RBC AUTO: 92 FL (ref 80–100)
MONOCYTES # BLD AUTO: 0.43 X10*3/UL (ref 0.1–1)
MONOCYTES NFR BLD AUTO: 6.4 %
NEUTROPHILS # BLD AUTO: 3.31 X10*3/UL (ref 1.2–7.7)
NEUTROPHILS NFR BLD AUTO: 49.3 %
NRBC BLD-RTO: 0 /100 WBCS (ref 0–0)
PLATELET # BLD AUTO: 200 X10*3/UL (ref 150–450)
POTASSIUM SERPL-SCNC: 3.9 MMOL/L (ref 3.5–5.3)
PROT SERPL-MCNC: 7.1 G/DL (ref 6.4–8.2)
RBC # BLD AUTO: 4.61 X10*6/UL (ref 4–5.2)
SODIUM SERPL-SCNC: 138 MMOL/L (ref 136–145)
WBC # BLD AUTO: 6.7 X10*3/UL (ref 4.4–11.3)

## 2025-04-10 PROCEDURE — 96372 THER/PROPH/DIAG INJ SC/IM: CPT

## 2025-04-10 PROCEDURE — 85025 COMPLETE CBC W/AUTO DIFF WBC: CPT

## 2025-04-10 PROCEDURE — 36415 COLL VENOUS BLD VENIPUNCTURE: CPT

## 2025-04-10 PROCEDURE — 2500000004 HC RX 250 GENERAL PHARMACY W/ HCPCS (ALT 636 FOR OP/ED): Mod: JZ,TB | Performed by: INTERNAL MEDICINE

## 2025-04-10 PROCEDURE — 80053 COMPREHEN METABOLIC PANEL: CPT

## 2025-04-10 RX ORDER — OCTREOTIDE ACETATE,MI-SPHERES 30 MG
30 VIAL (EA) INTRAMUSCULAR ONCE
OUTPATIENT
Start: 2025-05-08

## 2025-04-10 RX ORDER — OCTREOTIDE ACETATE,MI-SPHERES 30 MG
30 VIAL (EA) INTRAMUSCULAR ONCE
Status: COMPLETED | OUTPATIENT
Start: 2025-04-10 | End: 2025-04-10

## 2025-04-10 RX ORDER — FAMOTIDINE 10 MG/ML
20 INJECTION, SOLUTION INTRAVENOUS ONCE AS NEEDED
OUTPATIENT
Start: 2025-05-08

## 2025-04-10 RX ORDER — EPINEPHRINE 0.3 MG/.3ML
0.3 INJECTION SUBCUTANEOUS EVERY 5 MIN PRN
OUTPATIENT
Start: 2025-05-08

## 2025-04-10 RX ORDER — DIPHENHYDRAMINE HYDROCHLORIDE 50 MG/ML
50 INJECTION, SOLUTION INTRAMUSCULAR; INTRAVENOUS AS NEEDED
OUTPATIENT
Start: 2025-05-08

## 2025-04-10 RX ORDER — ALBUTEROL SULFATE 0.83 MG/ML
3 SOLUTION RESPIRATORY (INHALATION) AS NEEDED
OUTPATIENT
Start: 2025-05-08

## 2025-04-10 RX ADMIN — OCTREOTIDE ACETATE 30 MG: KIT at 15:20

## 2025-04-10 ASSESSMENT — PAIN SCALES - GENERAL: PAINLEVEL_OUTOF10: 0-NO PAIN

## 2025-04-12 ENCOUNTER — HOSPITAL ENCOUNTER (OUTPATIENT)
Dept: RADIOLOGY | Facility: HOSPITAL | Age: 45
Discharge: HOME | End: 2025-04-12
Payer: COMMERCIAL

## 2025-04-12 DIAGNOSIS — C7A.8 NEUROENDOCRINE CANCER: ICD-10-CM

## 2025-04-12 PROCEDURE — 2550000001 HC RX 255 CONTRASTS: Performed by: INTERNAL MEDICINE

## 2025-04-12 PROCEDURE — 74183 MRI ABD W/O CNTR FLWD CNTR: CPT

## 2025-04-12 PROCEDURE — 72197 MRI PELVIS W/O & W/DYE: CPT

## 2025-04-12 PROCEDURE — 74183 MRI ABD W/O CNTR FLWD CNTR: CPT | Performed by: RADIOLOGY

## 2025-04-12 PROCEDURE — 72197 MRI PELVIS W/O & W/DYE: CPT | Performed by: RADIOLOGY

## 2025-04-12 PROCEDURE — A9575 INJ GADOTERATE MEGLUMI 0.1ML: HCPCS | Performed by: INTERNAL MEDICINE

## 2025-04-12 RX ORDER — GADOTERATE MEGLUMINE 376.9 MG/ML
0.2 INJECTION INTRAVENOUS
Status: COMPLETED | OUTPATIENT
Start: 2025-04-12 | End: 2025-04-12

## 2025-04-12 RX ADMIN — GADOTERATE MEGLUMINE 17 ML: 376.9 INJECTION INTRAVENOUS at 10:46

## 2025-04-16 ENCOUNTER — OFFICE VISIT (OUTPATIENT)
Dept: HEMATOLOGY/ONCOLOGY | Facility: HOSPITAL | Age: 45
End: 2025-04-16
Payer: COMMERCIAL

## 2025-04-16 VITALS
SYSTOLIC BLOOD PRESSURE: 126 MMHG | HEART RATE: 62 BPM | DIASTOLIC BLOOD PRESSURE: 80 MMHG | OXYGEN SATURATION: 99 % | TEMPERATURE: 97.9 F | BODY MASS INDEX: 33.23 KG/M2 | WEIGHT: 205.91 LBS | RESPIRATION RATE: 18 BRPM

## 2025-04-16 DIAGNOSIS — C7A.8 NEUROENDOCRINE CANCER: ICD-10-CM

## 2025-04-16 PROCEDURE — 99215 OFFICE O/P EST HI 40 MIN: CPT | Performed by: INTERNAL MEDICINE

## 2025-04-16 PROCEDURE — 1036F TOBACCO NON-USER: CPT | Performed by: INTERNAL MEDICINE

## 2025-04-16 ASSESSMENT — PAIN SCALES - GENERAL: PAINLEVEL_OUTOF10: 5

## 2025-04-16 NOTE — PROGRESS NOTES
Patient ID: Brooke Chu is a 45 y.o. female.  Visit type: Follow up telephone visit    A telephone visit (audio only) between the patient (at the originating site) and the provider (at the distant site) was utilized to provide this telehealth service.   Verbal consent was requested and obtained from Brooke Chu on this date, 04/16/25 for a telehealth visit.       Current Therapy  Treatment Plan:  Octreotide [place], Every 28 Days      ONCOLOGIC HISTORY  45-year-old woman with a several year history of neck pain on the Rt side and for exploration they did full imaging include CT neck, chest and AP on 06/30/2020  which showed a large lobulated heterogeneously enhancing mass lesion involving segments 7 and 8 of the right hepatic lobe measuring approximately 13.2 x 10.5 x 9.7 cm in size. 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   MRI showed large hypervascular heterogeneously enhancing hepatic mass lesion in segment 7 and 8 and multiple other satellite hepatic lesions involving right and left hepatic lobes. Stable subcentimeter zayda hepatis lymph nodes. No other significant lymphadenopathy  or free intraperitoneal fluid.  PET-NET showed Dominant centrally necrotic somatostatin receptor expressing mass within hepatic segments 7 and 8. Additional satellite lesions in the bilateral hepatic lobes as described above. Somatostatin avid Left internal iliac lymph nodes. No additional  sites of abnormal Ga-68 dotatate uptake suggestive of malignancy identified.  PET-FDG didn't show any extra glucose uptake  Colonoscopy and SB enteroscopy didn't show any primary lesion. Symptoms of diarrhea and intermittent flushing s/p 1 dose of lanreotide after which she had severe bloating and abdominal pain as well as diarrhea   10/27: She had partial hepatectomy (segment 3, 5, 6), liver  ablation (segment 5, 3), cholecystectomy   She recovered well with surgery. She was tested positive for COVID  and only symptoms she has was loss of taste and stuffy nose.   She started on lanreotide but d/t multiple side effects including gas, bloating, and some hypoglycemia episodes she was switched to Octreotide 30 mg in December of 2021  MRI abd/pelvis 1/7/2021 appears stable but limited imaging d/t inability to do with contrast as IV infiltrated during scan per patient.     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 No abdominal pain, no nausea, or vomiting.    ROS  All 14 systems have been reviewed and were negative except for the HPI.      OBJECTIVE:    VS:  /80 (BP Location: Left arm, Patient Position: Sitting, BP Cuff Size: Adult)   Pulse 62   Temp 36.6 °C (97.9 °F) (Temporal)   Resp 18   Wt 93.4 kg (205 lb 14.6 oz)   SpO2 99%   BMI 33.23 kg/m²   Weight:   Vitals:    04/16/25 1352   Weight: 93.4 kg (205 lb 14.6 oz)         BSA: 2.09 meters squared    Physical Exam  General: Appears well and in NAD  Eyes: PERRL, EOMI, clear sclera  ENMT: mucous membranes moist, no apparent injury, no lesions seen  Head/Neck: Neck supple, no apparent injury, thyroid without mass or tenderness, No JVD, trachea midline,   Thorax: Patent airways, CTAB, normal breath sounds with good chest expansion, thorax symmetric  Cardiovascular: Regular, rate and rhythm, no murmurs, 2+ equal pulses of the extremities, normal S 1and S 2  Gastrointestinal: Nondistended, soft, non-tender, no rebound tenderness or guarding, no masses palpable, no organomegaly, +BS  Musculoskeletal: ROM intact, no joint swelling, normal strength  Extremities: normal extremities, no cyanosis edema, contusions or wounds, no clubbing  Neurological: alert and oriented x3, intact senses, motor, response and reflexes, normal  strength  Lymphatic: No significant lymphadenopathy  Psychological: Appropriate mood and behavior  Skin: Warm and dry, no lesions, no rashes      Diagnostic Results     Comprehensive Metabolic Panel              Component  Ref Range & Units 2 wk ago  (1/24/24) 4 mo ago  (10/6/23) 7 mo ago  (7/12/23) 10 mo ago  (3/29/23) 1 yr ago  (10/24/22) 1 yr ago  (8/27/22) 1 yr ago  (7/20/22)   Glucose  74 - 99 mg/dL 90 112 High  94 121 High  103 High  97 98   Sodium  136 - 145 mmol/L 140 137 143 140 139 137 136   Potassium  3.5 - 5.3 mmol/L 4.8 4.1 4.1 4.3 3.8 4.1 4.0   Chloride  98 - 107 mmol/L 106 105 108 High  105 108 High  107 103   Bicarbonate  21 - 32 mmol/L 25 25 28 28 23 21 26   Anion Gap  10 - 20 mmol/L 14 11 11 11 12 13 11   Urea Nitrogen  6 - 23 mg/dL 11 15 13 9 10 12 11   Creatinine  0.50 - 1.05 mg/dL 0.73 0.77 0.81 0.63 0.63 0.61 0.74   eGFR  >60 mL/min/1.73m*2 >90 >90 CM        Comment: Calculations of estimated GFR are performed using the 2021 CKD-EPI Study Refit equation without the race variable for the IDMS-Traceable creatinine methods.  https://jasn.asnjournals.org/content/early/2021/09/22/ASN.4939639771   Calcium  8.6 - 10.3 mg/dL 8.9 9.1 9.4 9.4 R 8.7 8.5 Low  8.7   Albumin  3.4 - 5.0 g/dL 4.1 4.1 4.2 4.2 4.0  4.1   Alkaline Phosphatase  33 - 110 U/L 80 73 70 68 63  65   Total Protein  6.4 - 8.2 g/dL 6.5 7.3 7.1 7.0 6.8  7.1   AST  9 - 39 U/L 16 19 14 13 13  20   Bilirubin, Total  0.0 - 1.2 mg/dL 0.4 0.4 0.3 0.4 0.5  0.4   ALT  7 - 45 U/L 15 19 CM 11 CM            Contains abnormal data CBC and Auto Differential                Component  Ref Range & Units 2 wk ago  (1/24/24) 4 mo ago  (10/6/23) 7 mo ago  (7/12/23) 10 mo ago  (3/29/23) 1 yr ago  (10/24/22) 1 yr ago  (7/20/22) 1 yr ago  (7/8/22)   WBC  4.4 - 11.3 x10*3/uL 11.8 High  7.5 9.5 R 7.9 R 7.4 R 6.8 R 9.0 R   nRBC  0.0 - 0.0 /100 WBCs 0.0 0.0        RBC  4.00 - 5.20 x10*6/uL 4.95 4.80 4.73 R 4.79 R 4.55 R 4.54 R 4.71 R   Hemoglobin  12.0 - 16.0 g/dL  14.7 14.6 14.2 14.1 13.6 13.4 14.0   Hematocrit  36.0 - 46.0 % 45.4 44.1 42.5 42.4 41.6 42.1 43.1   MCV  80 - 100 fL 92 92 90 89 91 93 92   MCH  26.0 - 34.0 pg 29.7 30.4        MCHC  32.0 - 36.0 g/dL 32.4 33.1 33.4 33.3 32.7 31.8 Low  32.5   RDW  11.5 - 14.5 % 13.0 12.9 13.5 13.5 13.3 13.2 12.9   Platelets  150 - 450 x10*3/uL 244 229 205 R 217 R 183 R 191 R 220 R   Neutrophils %  40.0 - 80.0 % 67.3 55.6 63.3 55.6 56.8 54.0 64.2   Immature Granulocytes %, Automated  0.0 - 0.9 % 0.4 0.3 CM 0.5 CM 0.4 CM 0.1 CM 0.6 CM 0.2 CM   Comment: Immature Granulocyte Count (IG) includes promyelocytes, myelocytes and metamyelocytes but does not include bands. Percent differential counts (%) should be interpreted in the context of the absolute cell counts (cells/UL).   Lymphocytes %  13.0 - 44.0 % 23.3 33.8 26.2 33.2 31.8 34.2 25.6 CM   Monocytes %  2.0 - 10.0 % 6.9 7.5 7.2 7.9 8.4 7.9 7.6   Eosinophils %  0.0 - 6.0 % 1.6 2.0 2.3 2.3 2.4 2.3 2.0   Basophils %  0.0 - 2.0 % 0.5 0.8 0.5 0.6 0.5 1.0 0.4   Neutrophils Absolute  1.20 - 7.70 x10*3/uL 7.90 High  4.16 CM 6.02 R 4.41 R 4.20 R 3.67 R 5.78 R   Comment: Percent differential counts (%) should be interpreted in the context of the absolute cell counts (cells/uL).   Immature Granulocytes Absolute, Automated  0.00 - 0.70 x10*3/uL 0.05 0.02        Lymphocytes Absolute  1.20 - 4.80 x10*3/uL 2.74 2.53 2.49 R 2.64 R 2.35 R 2.33 R 2.30 R   Monocytes Absolute  0.10 - 1.00 x10*3/uL 0.81 0.56 0.69 R 0.63 R 0.62 R 0.54 R 0.68 R   Eosinophils Absolute  0.00 - 0.70 x10*3/uL 0.19 0.15 0.22 R 0.18 R 0.18 R 0.16 R 0.18 R   Basophils Absolute  0.00 - 0.10 x10*3/uL 0.06 0.06 0.05 R 0.05 R 0.04 R 0.07 R 0.04 R   MPV  11.1 R             5-Hydroxyindoleacetic Acid,Plasma          Component  Ref Range & Units 2 wk ago 4 mo ago 10 mo ago   5-Hydroxyindoleacetic Acid (5-HIAA),Plasma  ng/mL 5.7 6.2 CM 3.3 CM          MR abdomen w and wo IV contrast, MR pelvis w and wo IV contrast  Narrative: Interpreted  By:  Kwame Baxter,   STUDY:  MR ABDOMEN W AND WO IV CONTRAST;  4/12/2025 11:01 am      INDICATION:  Signs/Symptoms:NET restaging.      COMPARISON:  January 2, 2024 and pelvis, images from April 22, 2024 microwave  ablation procedure, May 20, 2024 postablation ultrasound, September 30, 2024 MRI abdomen and October 7, 2024 PET-CT      ACCESSION NUMBER(S):  [id number]      ORDERING CLINICIAN:  LIGIA MOON      TECHNIQUE:  MRI abdomen and pelvis:  Multiplanar magnetic resonance images of the  abdomen were obtained including the following sequences; T2-weighted  SSFSE with and without fat saturation, T1-weighted GRE in/opposed  phase, DWI, fat saturated 3D-T1w GRE pre and dynamically post  contrast.  17 ml of  Gadolinium contrast agent Dotarem were administered  intravenously without immediate complication.      FINDINGS:  LIVER:  Subtle posterior dome region asymmetric enhancement portal phase  images series 27/17 estimated at 12 mm compared with 11 mm  corresponding to region of uptake on most recent PET-CT, poorly  assessed. There is also faint hypoenhancement along the lateral  aspect of the hyperenhancement which could correspond to the region  of uptake measuring less than 1 cm image 26/17. Attention recommended  on follow-up assessment.      There is a 8 mm targetoid observation with peripheral enhancement  posterior segment 4B margin image 26/40, relatively poorly delineated  due to small size and motion artifact. At least a portion of the  enhancement could be related to adjacent vessel. There is  corresponding arterial phase faint hyperenhancement image 22/41.  Finding corresponds to region of uptake on October 7, 2024 PET-CT and  is suspicious although limited assessment. Attention recommended on  follow-up.      Status post partial right hepatectomy and prior microwave ablation x4      Expected posttreatment changes at the prior ablation sites as noted  on 3 minute subtraction series 35 include:  Anterior hypoenhancing  dome observation measuring 9 mm image 15 compared with 15 mm  Posterolateral hypoenhancing dome observation measuring 14 mm image  15 compared with 21 mm. Posteromedial dome region observation  measuring 13 mm image 15 compared with 21 mm.          BILE DUCTS:  No intrahepatic or extrahepatic bile duct dilatation is demonstrated.      GALLBLADDER:  Surgically absent      PANCREAS:  No significant abnormality.      SPLEEN:  No significant abnormality.      ADRENAL GLANDS:  No significant abnormality.      KIDNEYS:  Probably benign 1 cm cyst left kidney. No hydronephrosis.      LYMPH NODES:  No lymphadenopathy.      ABDOMINAL VESSELS:  Aorta and the major abdominal arterial vessels demonstrate no gross  abnormality.  Superior mesenteric vein, splenic vein, and main, right  and left portal vein are patent. Hepatic veins are patent.  No  significant collaterals or esophageal varices are present.      BOWEL:  Nondilated without wall thickening or mass.      PERITONEUM/RETROPERITONEUM:  No significant abnormality.      BLADDER: No significant abnormality.      REPRODUCTIVE ORGANS: No suspicious pelvic mass. The uterine  junctional zone is prominent estimated at 16 mm. Correlate for  adenomyosis. 1 cm T2 hypointensity uterine fundus sagittal image 9/37  favors a leiomyoma.      BONES AND LOWER THORAX:  No abnormal enhancing osseous lesions. There are degenerative  changes. The visualized lower lung fields are unremarkable. The heart  is normal in size.      Impression: 1.  No evident new or progressive measurable disease.  2. Subtle MRI findings in region of uptake segment 8 and segment 4B  favor residual viable neoplasm, poorly assessed.  3. Improving posttreatment changes related to prior microwave  ablation.  4. Potential uterine adenomyosis and leiomyoma.          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 The possibility of origin in pancreas and duodenum should be excluded.   MRI showed large hypervascular heterogeneously enhancing hepatic mass lesion in segment 7 and 8 and multiple other satellite hepatic lesions involving right and left hepatic lobes. Stable subcentimeter zayda hepatis lymph nodes. No other significant lymphadenopathy  or free intraperitoneal fluid.  PET-NET showed Dominant centrally necrotic somatostatin receptor expressing mass within hepatic segments 7 and 8. Additional satellite lesions in the bilateral hepatic lobes as described above. Somatostatin avid Left internal iliac lymph nodes. No additional  sites of abnormal Ga-68 dotatate uptake suggestive of malignancy identified.  PET-FDG didnt show any extra glucose uptake  Colonoscopy and SB enteroscopy didn't show any primary lesion  10/27/2020: She had partial hepatectomy (segment 3, 5, 6), liver ablation (segment 5, 3), cholecystectomy and pth confirmed G2 well diff NET (kI67 3.1%)    She recovered well with surgery. She was tested positive for COVID  and only symptoms she has was loss of taste and stuffy nose.   04/26/2021: Restaging CT scan showed post surgical changes and postablation changes in the liver with no new lesions  MRI showed At least 4 foci of restricted diffusion are identified in segments 8 and 3 without correlating abnormality on other sequences. However 2 of these  "foci correlate with hypervascular foci and a prior CT scan.  Findings are nonspecific, however  metastatic foci cannot be excluded  MRI showed stable disease on 08/16/2021  PET-Ga68 on 11/2021 showed multiple small (3-4) somatostatin avid foci are noted scattered throughout the hepatic  parenchyma which appear stable number and relative intensity compared to the prior exam   Octreotide 30 mg started 12/2021 as she was not tolerating lanreotide well with multiple side effects.  MRI Brain 12/13 done for recurrent headaches/ to rule out mets- Scan WNL  MRI abd/pelvis 1/7/2021 appears stable but limited imaging d/t inability to do with contrast as IV infiltrated during scan per patient.   Repeated MRI on 03/2022: Showed stable disease with one new liver lesion   Repeated MRI on 07/23/2022: Showed stable disease   PET scan on 10/14/2022 shows stable disease  MRI AP on 3/6/2023 shows stable disease. 5HIAA 3.3 on 3/29/23  MRI AP on 7/3/2023 shows stable disease  MRI AP on 10/14/23 shows: \"Status post partial right hepatectomy. Stable indeterminate hepatic arterial enhancing foci as detailed above. No new suspicious lesions.\"  MRI AP on 1/2/24 shows: Few small subcentimeter arterially enhancing foci without signal abnormality on other postcontrast sequences or anatomic sequences  Patient presented to TB with recommendations: \"Liver foci are very small and not sure if these are new NETs. MRI w eovist to compare with MRI on 10/2023 and PET-CU64. Resume SSA and if PET-CU64 only showed liver lesions will proceed with surgery vs ablation\"  PET-CU64 showed stable multiple somatostatin avid lesions are scattered throughout the liver  MRI w eovist showed enhancing hepatic segment 8 lesion and stable arterial enhancing hepatic segment 5 lesion  She had MWA on 04/22/2024  Restaging MRI on 06/2024: Showed New ablation sites involving the liver. One of the previous suspicious diffusion restricting hepatic lesions is no longer " identified. A subcentimeter arterial enhancing nodule in segment VIII appears similar to prior exam  We held SSA due to abdominal pain, bloating and fatigue   09/30/2024: Restaging MRI showed previous arterial enhancing observation without restricted diffusion in hepatic segment VIII is not significantly changed. Three  additional subcentimeter arterial enhancing observations in the liver  without restricted diffusion appear new since prior exam.  10/2024; PET-Ga68 showed persistent Ga68 DOTATATE avid lesion in the hepatic segment 4B and 8.   11/06/2024: MRI liver demonstrated small subcentimeter  hypervascular foci throughout the liver   She is not eligible for histotripsy or surgical resection given many of them and they are small  She resumed on SSAs  04/12/2025: restaging scans showed SD and possible uterine adenomyosis   Today: She denies flushing or abdominal pain. No nausea or vomiting or diarrhea.     Plan:    -Will resume SSA with Octreotide 30 mg given she had side effects with lanreotide   -RTC after scans for follow up in 6 months with MRI AP  - Referral to gynecology      Sotero Arango M.D.   and Director of the Neuroendocrine Tumor Program  Gastrointestinal Medical Oncology  Department of Hematology and Oncology  [facility], [place] [address]  Phone (Office): [phone]  Fax:  [phone]   Email: [email or web address]  Learn more about [facility] Comprehensive Neuroendocrine Tumor Program: Click Here  Learn more about [place] [employer]end[employer]: [email or web address]

## 2025-05-08 ENCOUNTER — INFUSION (OUTPATIENT)
Dept: HEMATOLOGY/ONCOLOGY | Facility: HOSPITAL | Age: 45
End: 2025-05-08
Payer: COMMERCIAL

## 2025-05-08 VITALS
RESPIRATION RATE: 16 BRPM | HEART RATE: 83 BPM | TEMPERATURE: 97.9 F | DIASTOLIC BLOOD PRESSURE: 89 MMHG | SYSTOLIC BLOOD PRESSURE: 135 MMHG | OXYGEN SATURATION: 97 %

## 2025-05-08 DIAGNOSIS — C7A.8 NEUROENDOCRINE CANCER: ICD-10-CM

## 2025-05-08 PROCEDURE — 96372 THER/PROPH/DIAG INJ SC/IM: CPT

## 2025-05-08 PROCEDURE — 2500000004 HC RX 250 GENERAL PHARMACY W/ HCPCS (ALT 636 FOR OP/ED): Mod: JZ,TB | Performed by: INTERNAL MEDICINE

## 2025-05-08 RX ORDER — ALBUTEROL SULFATE 0.83 MG/ML
3 SOLUTION RESPIRATORY (INHALATION) AS NEEDED
OUTPATIENT
Start: 2025-06-05

## 2025-05-08 RX ORDER — DIPHENHYDRAMINE HYDROCHLORIDE 50 MG/ML
50 INJECTION, SOLUTION INTRAMUSCULAR; INTRAVENOUS AS NEEDED
OUTPATIENT
Start: 2025-06-05

## 2025-05-08 RX ORDER — FAMOTIDINE 10 MG/ML
20 INJECTION, SOLUTION INTRAVENOUS ONCE AS NEEDED
OUTPATIENT
Start: 2025-06-05

## 2025-05-08 RX ORDER — EPINEPHRINE 0.3 MG/.3ML
0.3 INJECTION SUBCUTANEOUS EVERY 5 MIN PRN
OUTPATIENT
Start: 2025-06-05

## 2025-05-08 RX ORDER — OCTREOTIDE ACETATE,MI-SPHERES 30 MG
30 VIAL (EA) INTRAMUSCULAR ONCE
Status: COMPLETED | OUTPATIENT
Start: 2025-05-08 | End: 2025-05-08

## 2025-05-08 RX ORDER — OCTREOTIDE ACETATE,MI-SPHERES 30 MG
30 VIAL (EA) INTRAMUSCULAR ONCE
OUTPATIENT
Start: 2025-06-05

## 2025-05-08 RX ADMIN — OCTREOTIDE ACETATE 30 MG: KIT at 14:49

## 2025-05-08 ASSESSMENT — PAIN SCALES - GENERAL: PAINLEVEL_OUTOF10: 0-NO PAIN

## 2025-05-19 ENCOUNTER — OFFICE VISIT (OUTPATIENT)
Dept: URGENT CARE | Age: 45
End: 2025-05-19
Payer: COMMERCIAL

## 2025-05-19 VITALS
BODY MASS INDEX: 33.23 KG/M2 | RESPIRATION RATE: 18 BRPM | DIASTOLIC BLOOD PRESSURE: 84 MMHG | OXYGEN SATURATION: 98 % | HEART RATE: 62 BPM | TEMPERATURE: 98.4 F | WEIGHT: 205.91 LBS | SYSTOLIC BLOOD PRESSURE: 145 MMHG

## 2025-05-19 DIAGNOSIS — J02.9 SORE THROAT: ICD-10-CM

## 2025-05-19 DIAGNOSIS — J01.40 ACUTE NON-RECURRENT PANSINUSITIS: Primary | ICD-10-CM

## 2025-05-19 LAB — POC RAPID STREP: NEGATIVE

## 2025-05-19 PROCEDURE — 99213 OFFICE O/P EST LOW 20 MIN: CPT

## 2025-05-19 PROCEDURE — 87880 STREP A ASSAY W/OPTIC: CPT

## 2025-05-19 PROCEDURE — 1036F TOBACCO NON-USER: CPT

## 2025-05-19 RX ORDER — AMOXICILLIN 875 MG/1
875 TABLET, COATED ORAL 2 TIMES DAILY
Qty: 14 TABLET | Refills: 0 | Status: SHIPPED | OUTPATIENT
Start: 2025-05-19 | End: 2025-05-26

## 2025-05-19 ASSESSMENT — ENCOUNTER SYMPTOMS
EYE PAIN: 0
VOMITING: 0
CHILLS: 0
FATIGUE: 0
SHORTNESS OF BREATH: 0
SINUS PRESSURE: 1
STRIDOR: 0
ABDOMINAL PAIN: 0
SORE THROAT: 1
FEVER: 0
DIARRHEA: 0
DIZZINESS: 0
CHEST TIGHTNESS: 0
COUGH: 0
EYE DISCHARGE: 0
HEADACHES: 1
WHEEZING: 0
EYE ITCHING: 0

## 2025-05-19 NOTE — PROGRESS NOTES
Subjective   Patient ID: Brooke Chu is a 45 y.o. female. They present today with a chief complaint of Nasal Congestion and Headache (10 days of symptoms ).    History of Present Illness  Patient is a 45-year-old female presenting to the clinic with complaints of nasal congestion, sinus pressure, sinus headaches, right-sided ear pressure.  Symptoms have been present for a week and a half now.  She also has associated mild sore throat worse to swallow.  No chest pain no shortness of breath no difficulty breathing no cough.  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  Patient believes she may have sinusitis but would like tested for strep.      History provided by:  Patient  Headache  Associated symptoms: congestion, drainage, ear pain, sinus pressure and sore throat    Associated symptoms: no abdominal pain, no cough, no diarrhea, no dizziness, no eye pain, no fatigue, no fever and no vomiting        Past Medical History  Allergies as of 05/19/2025 - Reviewed 05/19/2025   Allergen Reaction Noted    Corticosteroids (glucocorticoids) Unknown 06/06/2024    Methylprednisolone Other and Unknown 08/30/2023    Other Unknown 08/30/2023       Prescriptions Prior to Admission[1]     Medical History[2]    Surgical History[3]     reports that she has quit smoking. Her smoking use included cigarettes. She has never been exposed to tobacco smoke. She has never used smokeless tobacco. She reports that she does not drink alcohol and does not use drugs.    Review of Systems  Review of Systems   Constitutional:  Negative for chills, fatigue and fever.   HENT:  Positive for congestion, ear pain, postnasal drip, sinus pressure and sore throat. Negative for ear discharge.    Eyes:  Negative for pain, discharge and itching.   Respiratory:  Negative for cough, chest tightness, shortness of breath, wheezing and stridor.    Cardiovascular:  Negative for chest pain.   Gastrointestinal:  Negative for abdominal  pain, diarrhea and vomiting.   Neurological:  Positive for headaches. Negative for dizziness.                                  Objective    Vitals:    05/19/25 0943   BP: 145/84   BP Location: Right arm   Patient Position: Sitting   BP Cuff Size: Adult   Pulse: 62   Resp: 18   Temp: 36.9 °C (98.4 °F)   TempSrc: Oral   SpO2: 98%   Weight: 93.4 kg (205 lb 14.6 oz)     No LMP recorded.    Physical Exam  Vitals reviewed.   Constitutional:       General: She is awake. She is not in acute distress.     Appearance: Normal appearance. She is well-developed. She is not ill-appearing or toxic-appearing.   HENT:      Head: Normocephalic and atraumatic.      Right Ear: Tympanic membrane, ear canal and external ear normal.      Left Ear: Tympanic membrane, ear canal and external ear normal.      Nose: Congestion present.      Right Sinus: Maxillary sinus tenderness present.      Mouth/Throat:      Mouth: Mucous membranes are moist.      Pharynx: Oropharynx is clear. Uvula midline. No oropharyngeal exudate.      Tonsils: No tonsillar exudate or tonsillar abscesses.   Eyes:      Conjunctiva/sclera: Conjunctivae normal.   Cardiovascular:      Rate and Rhythm: Normal rate and regular rhythm.      Heart sounds: Normal heart sounds, S1 normal and S2 normal. No murmur heard.     No friction rub. No gallop.   Pulmonary:      Effort: Pulmonary effort is normal. No respiratory distress.      Breath sounds: Normal breath sounds. No stridor. No wheezing, rhonchi or rales.   Skin:     General: Skin is warm.   Neurological:      General: No focal deficit present.      Mental Status: She is alert and oriented to person, place, and time. Mental status is at baseline.      Gait: Gait is intact.   Psychiatric:         Mood and Affect: Mood normal.         Behavior: Behavior normal. Behavior is cooperative.         Procedures    Point of Care Test & Imaging Results from this visit  No results found for this visit on 05/19/25.   Imaging  No results  found.    Cardiology, Vascular, and Other Imaging  No other imaging results found for the past 2 days      Diagnostic study results (if any) were reviewed by [facility].    Assessment/Plan   Allergies, medications, history, and pertinent labs/EKGs/Imaging reviewed by Anderson Guillen PA-C.     Medical Decision Making:    Patient is a 45-year-old female presenting to the clinic with complaints of nasal congestion, sinus pressure, sinus headaches, right-sided ear pressure.  Symptoms have been present for a week and a half now.  She also has associated mild sore throat worse to swallow.  No chest pain no shortness of breath no difficulty breathing no cough.  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  Patient believes she may have sinusitis but would like tested for strep.  Vital signs in the clinic are stable within normal limits.  Physical examination as above.  Symptoms consistent with that of sinusitis.  No signs of otitis media.  Strep test in the clinic rapid negative.  Will treat with amoxicillin for sinusitis with discharge instructions. Discharge instructions: Please follow up with your Primary Care Physician within the next 5-7 days. It is important to take prescriptions as prescribed and complete all antibiotics. If your symptoms worsen you are instructed to immediately go to the emergency room for reevaluation and further assessment. If you develop any chest pain, SOB, or difficulty breathing you are instructed to go to the emergency room for reevaluation. All discharge instructions will be provided and explained to the patient at discharge. If you have any questions regarding your treatment plan please call the [facility] urgent care clinic.     Orders and Diagnoses  There are no diagnoses linked to this encounter.    Medical Admin Record      Patient disposition: Home    Electronically signed by [facility]  10:49 AM           [1] (Not in a hospital  admission)  [2]   Past Medical History:  Diagnosis Date    Chronic headaches     Dizziness     Fibroids     Fibroids     Hypoglycemia     Liver mass     Neuroendocrine cancer     Other bursal cyst, unspecified wrist     Synovial cyst of wrist    Ovarian cyst     Ovarian cyst     Streptococcus pharyngitis 03/19/2024   [3]   Past Surgical History:  Procedure Laterality Date    CERVICAL FUSION      CHOLECYSTECTOMY      COLONOSCOPY      CT GUIDED PERCUTANEOUS PERITONEAL OR RETROPERITONEAL FLUID COLLECTION DRAINAGE  11/03/2020    CT GUIDED PERCUTANEOUS PERITONEAL OR RETROPERITONEAL FLUID COLLECTION DRAINAGE 11/3/2020 [facility] INPATIENT LEGAC[facility]    OTHER SURGICAL HISTORY  08/24/2022    Neck surgery    OTHER SURGICAL HISTORY  11/14/2020    Hepatectomy partial    OTHER SURGICAL HISTORY  11/14/2020    Liver tumor ablation    OTHER SURGICAL HISTORY      Liver biospy    SMALL BOWEL ENTEROSCOPY      TUBAL LIGATION      US GUIDED NEEDLE LIVER BIOPSY  07/23/2020    US GUIDED NEEDLE LIVER BIOPSY 7/23/2020 [facility] AIB LEGAC[facility]    WRIST SURGERY

## 2025-05-19 NOTE — PATIENT INSTRUCTIONS
Discharge instructions:    Please follow up with your Primary Care Physician within the next 5-7 days.    It is important to take prescriptions as prescribed and complete all antibiotics.     If your symptoms worsen you are instructed to immediately go to the emergency room for reevaluation and further assessment.    If you develop any chest pain, SOB, or difficulty breathing you are instructed to go to the emergency room for reevaluation.    All discharge instructions will be provided and explained to the patient at discharge.    If you have any questions regarding your treatment plan please call the [facility] urgent care clinic.

## 2025-05-20 ENCOUNTER — TELEPHONE (OUTPATIENT)
Dept: URGENT CARE | Age: 45
End: 2025-05-20

## 2025-05-28 ENCOUNTER — APPOINTMENT (OUTPATIENT)
Facility: CLINIC | Age: 45
End: 2025-05-28
Payer: COMMERCIAL

## 2025-06-05 ENCOUNTER — APPOINTMENT (OUTPATIENT)
Dept: HEMATOLOGY/ONCOLOGY | Facility: HOSPITAL | Age: 45
End: 2025-06-05
Payer: COMMERCIAL

## 2025-06-06 ENCOUNTER — INFUSION (OUTPATIENT)
Dept: HEMATOLOGY/ONCOLOGY | Facility: HOSPITAL | Age: 45
End: 2025-06-06
Payer: COMMERCIAL

## 2025-06-06 VITALS
OXYGEN SATURATION: 98 % | RESPIRATION RATE: 16 BRPM | SYSTOLIC BLOOD PRESSURE: 126 MMHG | HEART RATE: 86 BPM | DIASTOLIC BLOOD PRESSURE: 80 MMHG | TEMPERATURE: 97.3 F

## 2025-06-06 DIAGNOSIS — C7A.8 NEUROENDOCRINE CANCER: ICD-10-CM

## 2025-06-06 PROCEDURE — 96372 THER/PROPH/DIAG INJ SC/IM: CPT

## 2025-06-06 PROCEDURE — 2500000004 HC RX 250 GENERAL PHARMACY W/ HCPCS (ALT 636 FOR OP/ED): Mod: JZ,TB | Performed by: INTERNAL MEDICINE

## 2025-06-06 RX ORDER — OCTREOTIDE ACETATE,MI-SPHERES 30 MG
30 VIAL (EA) INTRAMUSCULAR ONCE
OUTPATIENT
Start: 2025-07-03

## 2025-06-06 RX ORDER — DIPHENHYDRAMINE HYDROCHLORIDE 50 MG/ML
50 INJECTION, SOLUTION INTRAMUSCULAR; INTRAVENOUS AS NEEDED
OUTPATIENT
Start: 2025-07-03

## 2025-06-06 RX ORDER — FAMOTIDINE 10 MG/ML
20 INJECTION, SOLUTION INTRAVENOUS ONCE AS NEEDED
OUTPATIENT
Start: 2025-07-03

## 2025-06-06 RX ORDER — ALBUTEROL SULFATE 0.83 MG/ML
3 SOLUTION RESPIRATORY (INHALATION) AS NEEDED
OUTPATIENT
Start: 2025-07-03

## 2025-06-06 RX ORDER — OCTREOTIDE ACETATE,MI-SPHERES 30 MG
30 VIAL (EA) INTRAMUSCULAR ONCE
Status: COMPLETED | OUTPATIENT
Start: 2025-06-06 | End: 2025-06-06

## 2025-06-06 RX ORDER — EPINEPHRINE 0.3 MG/.3ML
0.3 INJECTION SUBCUTANEOUS EVERY 5 MIN PRN
OUTPATIENT
Start: 2025-07-03

## 2025-06-06 RX ADMIN — OCTREOTIDE ACETATE 30 MG: KIT at 12:59

## 2025-06-06 ASSESSMENT — PAIN SCALES - GENERAL: PAINLEVEL_OUTOF10: 0-NO PAIN

## 2025-07-03 ENCOUNTER — APPOINTMENT (OUTPATIENT)
Dept: HEMATOLOGY/ONCOLOGY | Facility: HOSPITAL | Age: 45
End: 2025-07-03
Payer: COMMERCIAL

## 2025-07-09 ENCOUNTER — APPOINTMENT (OUTPATIENT)
Dept: PRIMARY CARE | Facility: CLINIC | Age: 45
End: 2025-07-09
Payer: COMMERCIAL

## 2025-07-10 ENCOUNTER — INFUSION (OUTPATIENT)
Dept: HEMATOLOGY/ONCOLOGY | Facility: HOSPITAL | Age: 45
End: 2025-07-10
Payer: COMMERCIAL

## 2025-07-10 VITALS
SYSTOLIC BLOOD PRESSURE: 145 MMHG | OXYGEN SATURATION: 97 % | HEART RATE: 67 BPM | TEMPERATURE: 97 F | DIASTOLIC BLOOD PRESSURE: 85 MMHG | RESPIRATION RATE: 16 BRPM

## 2025-07-10 DIAGNOSIS — C7A.8 NEUROENDOCRINE CANCER: ICD-10-CM

## 2025-07-10 PROCEDURE — 2500000004 HC RX 250 GENERAL PHARMACY W/ HCPCS (ALT 636 FOR OP/ED): Mod: JZ,TB | Performed by: INTERNAL MEDICINE

## 2025-07-10 PROCEDURE — 96372 THER/PROPH/DIAG INJ SC/IM: CPT

## 2025-07-10 RX ORDER — DIPHENHYDRAMINE HYDROCHLORIDE 50 MG/ML
50 INJECTION, SOLUTION INTRAMUSCULAR; INTRAVENOUS AS NEEDED
OUTPATIENT
Start: 2025-08-01

## 2025-07-10 RX ORDER — EPINEPHRINE 0.3 MG/.3ML
0.3 INJECTION SUBCUTANEOUS EVERY 5 MIN PRN
OUTPATIENT
Start: 2025-08-01

## 2025-07-10 RX ORDER — FAMOTIDINE 10 MG/ML
20 INJECTION, SOLUTION INTRAVENOUS ONCE AS NEEDED
OUTPATIENT
Start: 2025-08-01

## 2025-07-10 RX ORDER — ALBUTEROL SULFATE 0.83 MG/ML
3 SOLUTION RESPIRATORY (INHALATION) AS NEEDED
OUTPATIENT
Start: 2025-08-01

## 2025-07-10 RX ORDER — OCTREOTIDE ACETATE,MI-SPHERES 30 MG
30 VIAL (EA) INTRAMUSCULAR ONCE
OUTPATIENT
Start: 2025-08-01

## 2025-07-10 RX ORDER — OCTREOTIDE ACETATE,MI-SPHERES 30 MG
30 VIAL (EA) INTRAMUSCULAR ONCE
Status: COMPLETED | OUTPATIENT
Start: 2025-07-10 | End: 2025-07-10

## 2025-07-10 RX ADMIN — OCTREOTIDE ACETATE 30 MG: KIT at 09:47

## 2025-07-10 ASSESSMENT — PAIN SCALES - GENERAL: PAINLEVEL_OUTOF10: 0-NO PAIN

## 2025-07-23 ENCOUNTER — APPOINTMENT (OUTPATIENT)
Dept: PRIMARY CARE | Facility: CLINIC | Age: 45
End: 2025-07-23
Payer: COMMERCIAL

## 2025-07-23 VITALS
BODY MASS INDEX: 34.67 KG/M2 | SYSTOLIC BLOOD PRESSURE: 120 MMHG | TEMPERATURE: 98.2 F | WEIGHT: 214.8 LBS | DIASTOLIC BLOOD PRESSURE: 88 MMHG | HEART RATE: 77 BPM | OXYGEN SATURATION: 98 %

## 2025-07-23 DIAGNOSIS — Z91.030 BEE STING ALLERGY: Primary | ICD-10-CM

## 2025-07-23 DIAGNOSIS — R11.0 NAUSEA: ICD-10-CM

## 2025-07-23 DIAGNOSIS — C7B.8 METASTATIC MALIGNANT NEUROENDOCRINE TUMOR TO LIVER: ICD-10-CM

## 2025-07-23 PROCEDURE — 99204 OFFICE O/P NEW MOD 45 MIN: CPT | Performed by: FAMILY MEDICINE

## 2025-07-23 RX ORDER — ONDANSETRON 4 MG/1
4 TABLET, FILM COATED ORAL EVERY 8 HOURS PRN
Qty: 20 TABLET | Refills: 3 | Status: SHIPPED | OUTPATIENT
Start: 2025-07-23

## 2025-07-23 RX ORDER — EPINEPHRINE 0.3 MG/.3ML
1 INJECTION SUBCUTANEOUS AS NEEDED
Qty: 2 EACH | Refills: 3 | Status: SHIPPED | OUTPATIENT
Start: 2025-07-23 | End: 2026-07-23

## 2025-07-23 ASSESSMENT — ENCOUNTER SYMPTOMS
CONSTIPATION: 0
HEMATURIA: 0
VOMITING: 0
SINUS PRESSURE: 0
JOINT SWELLING: 0
SLEEP DISTURBANCE: 0
SHORTNESS OF BREATH: 0
ABDOMINAL PAIN: 0
DYSPHORIC MOOD: 0
EYE DISCHARGE: 0
RHINORRHEA: 0
ARTHRALGIAS: 0
COUGH: 0
NAUSEA: 0
DIZZINESS: 0
ACTIVITY CHANGE: 0
HEADACHES: 0
WHEEZING: 0
FLANK PAIN: 0
BLOOD IN STOOL: 0
LIGHT-HEADEDNESS: 0
DIARRHEA: 0
FEVER: 0
PALPITATIONS: 0
MYALGIAS: 0
EYE ITCHING: 0
APPETITE CHANGE: 0
SORE THROAT: 0
UNEXPECTED WEIGHT CHANGE: 0
NUMBNESS: 0
WEAKNESS: 0
NERVOUS/ANXIOUS: 0
DYSURIA: 0

## 2025-07-23 NOTE — PROGRESS NOTES
Subjective   Patient ID: Brooke Chu is a 45 y.o. female who presents for Establish Care (Brooke is here to get establish, also she stated she currently have cancer but she see's a endocrinologist for that) and Allergic Reaction (Also she stated she have a bee allergies and she would like to know if she can get a epi pen prescribed).  HPI  SHE IS GETTING INFUSIONS FOR A NEUROENDOCRINE TUMOR (FOUND IN HER LIVER AND OF UNKNOWN PRIMARY)  PART OF HER LIVER THAT HAD THE TUMOR  SHE RECEIVES INFUSIONS OF OCTREOTIDE AT INTERVALS AND FOLLOWS WITH DR MOON  SHE IS SEEING GYNECOLOGY SOON    Review of Systems   Constitutional:  Negative for activity change, appetite change, fever and unexpected weight change.   HENT:  Negative for congestion, ear pain, postnasal drip, rhinorrhea, sinus pressure and sore throat.    Eyes:  Negative for discharge, itching and visual disturbance.   Respiratory:  Negative for cough, shortness of breath and wheezing.    Cardiovascular:  Negative for chest pain, palpitations and leg swelling.   Gastrointestinal:  Negative for abdominal pain, blood in stool, constipation, diarrhea, nausea and vomiting.   Endocrine: Negative for cold intolerance, heat intolerance and polyuria.   Genitourinary:  Negative for dysuria, flank pain and hematuria.   Musculoskeletal:  Negative for arthralgias, gait problem, joint swelling and myalgias.   Skin:  Negative for rash.   Allergic/Immunologic: Negative for environmental allergies and food allergies.   Neurological:  Negative for dizziness, syncope, weakness, light-headedness, numbness and headaches.   Hematological:         FOLLOWS WITH ONCOLOGY WITH HER NEUROENDOCRINE TUMOR    Psychiatric/Behavioral:  Negative for dysphoric mood and sleep disturbance. The patient is not nervous/anxious.            4/12/2025     9:55 AM 4/16/2025     1:52 PM 5/8/2025     2:40 PM 5/19/2025     9:43 AM 6/6/2025    12:41 PM 7/10/2025     9:28 AM 7/23/2025     8:49 AM   Vitals  "  Systolic  126 135 145 126 145 120   Diastolic  80 89 84 80 85 88   BP Location  Left arm Left arm Right arm Left arm Right arm    Heart Rate  62 83 62 86 67 77   Temp  36.6 °C (97.9 °F) 36.6 °C (97.9 °F) 36.9 °C (98.4 °F) 36.3 °C (97.3 °F) 36.1 °C (97 °F) 36.8 °C (98.2 °F)   Resp  18 16 18 16 16    Height 1.676 m (5' 6\")         Weight (lb) 190 205.91  205.91   214.8   BMI 30.67 kg/m2 33.23 kg/m2  33.23 kg/m2   34.67 kg/m2   BSA (m2) 2 m2 2.09 m2  2.09 m2   2.13 m2   Visit Report  Report  Report   Report       Body mass index is 34.67 kg/m².      Objective   Physical Exam  Vitals and nursing note reviewed.   Constitutional:       Appearance: Normal appearance.   HENT:      Head: Normocephalic.     Cardiovascular:      Rate and Rhythm: Normal rate and regular rhythm.      Pulses: Normal pulses.      Heart sounds: Normal heart sounds. No murmur heard.     No friction rub. No gallop.   Pulmonary:      Effort: Pulmonary effort is normal. No respiratory distress.      Breath sounds: Normal breath sounds. No wheezing.   Abdominal:      General: Bowel sounds are normal. There is no distension.      Palpations: Abdomen is soft.      Tenderness: There is no abdominal tenderness.      Comments: LARGE SCARE FROM SURGERY TO REMOVE LIVER AND EXPLORATORY OF SMALL BOWEL   GB IS GONE      Musculoskeletal:         General: No deformity. Normal range of motion.     Skin:     General: Skin is warm and dry.      Capillary Refill: Capillary refill takes less than 2 seconds.     Neurological:      General: No focal deficit present.      Mental Status: She is alert and oriented to person, place, and time.     Psychiatric:         Mood and Affect: Mood normal.         Assessment/Plan   Problem List Items Addressed This Visit       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    SEE HPI FOR DISCUSSION OF THIS          Bee sting allergy - Primary    Relevant Medications    EPINEPHrine (Epipen) 0.3 mg/0.3 mL injection syringe    " Nausea    Relevant Medications    ondansetron (Zofran) 4 mg tablet          Jaclyn Zimmerman DO

## 2025-07-31 ENCOUNTER — APPOINTMENT (OUTPATIENT)
Dept: HEMATOLOGY/ONCOLOGY | Facility: HOSPITAL | Age: 45
End: 2025-07-31
Payer: COMMERCIAL

## 2025-08-01 ENCOUNTER — APPOINTMENT (OUTPATIENT)
Dept: HEMATOLOGY/ONCOLOGY | Facility: HOSPITAL | Age: 45
End: 2025-08-01
Payer: COMMERCIAL

## 2025-08-07 ENCOUNTER — INFUSION (OUTPATIENT)
Dept: HEMATOLOGY/ONCOLOGY | Facility: HOSPITAL | Age: 45
End: 2025-08-07
Payer: COMMERCIAL

## 2025-08-07 VITALS
RESPIRATION RATE: 16 BRPM | TEMPERATURE: 97.5 F | HEART RATE: 76 BPM | SYSTOLIC BLOOD PRESSURE: 125 MMHG | DIASTOLIC BLOOD PRESSURE: 83 MMHG | OXYGEN SATURATION: 99 %

## 2025-08-07 DIAGNOSIS — C7A.8 NEUROENDOCRINE CANCER: ICD-10-CM

## 2025-08-07 PROCEDURE — 96372 THER/PROPH/DIAG INJ SC/IM: CPT

## 2025-08-07 PROCEDURE — 2500000004 HC RX 250 GENERAL PHARMACY W/ HCPCS (ALT 636 FOR OP/ED): Mod: JZ,TB | Performed by: INTERNAL MEDICINE

## 2025-08-07 RX ORDER — ALBUTEROL SULFATE 0.83 MG/ML
3 SOLUTION RESPIRATORY (INHALATION) AS NEEDED
OUTPATIENT
Start: 2025-09-04

## 2025-08-07 RX ORDER — EPINEPHRINE 0.3 MG/.3ML
0.3 INJECTION SUBCUTANEOUS EVERY 5 MIN PRN
Status: DISCONTINUED | OUTPATIENT
Start: 2025-08-07 | End: 2025-08-07 | Stop reason: HOSPADM

## 2025-08-07 RX ORDER — ALBUTEROL SULFATE 0.83 MG/ML
3 SOLUTION RESPIRATORY (INHALATION) AS NEEDED
Status: DISCONTINUED | OUTPATIENT
Start: 2025-08-07 | End: 2025-08-07 | Stop reason: HOSPADM

## 2025-08-07 RX ORDER — OCTREOTIDE ACETATE,MI-SPHERES 30 MG
30 VIAL (EA) INTRAMUSCULAR ONCE
OUTPATIENT
Start: 2025-09-04

## 2025-08-07 RX ORDER — FAMOTIDINE 10 MG/ML
20 INJECTION, SOLUTION INTRAVENOUS ONCE AS NEEDED
OUTPATIENT
Start: 2025-09-04

## 2025-08-07 RX ORDER — OCTREOTIDE ACETATE,MI-SPHERES 30 MG
30 VIAL (EA) INTRAMUSCULAR ONCE
Status: COMPLETED | OUTPATIENT
Start: 2025-08-07 | End: 2025-08-07

## 2025-08-07 RX ORDER — DIPHENHYDRAMINE HYDROCHLORIDE 50 MG/ML
50 INJECTION, SOLUTION INTRAMUSCULAR; INTRAVENOUS AS NEEDED
Status: DISCONTINUED | OUTPATIENT
Start: 2025-08-07 | End: 2025-08-07 | Stop reason: HOSPADM

## 2025-08-07 RX ORDER — FAMOTIDINE 10 MG/ML
20 INJECTION, SOLUTION INTRAVENOUS ONCE AS NEEDED
Status: DISCONTINUED | OUTPATIENT
Start: 2025-08-07 | End: 2025-08-07 | Stop reason: HOSPADM

## 2025-08-07 RX ORDER — EPINEPHRINE 0.3 MG/.3ML
0.3 INJECTION SUBCUTANEOUS EVERY 5 MIN PRN
OUTPATIENT
Start: 2025-09-04

## 2025-08-07 RX ORDER — DIPHENHYDRAMINE HYDROCHLORIDE 50 MG/ML
50 INJECTION, SOLUTION INTRAMUSCULAR; INTRAVENOUS AS NEEDED
OUTPATIENT
Start: 2025-09-04

## 2025-08-07 RX ADMIN — OCTREOTIDE ACETATE 30 MG: KIT at 15:27

## 2025-08-07 ASSESSMENT — PAIN SCALES - GENERAL: PAINLEVEL_OUTOF10: 0-NO PAIN

## 2025-08-28 ENCOUNTER — APPOINTMENT (OUTPATIENT)
Dept: HEMATOLOGY/ONCOLOGY | Facility: HOSPITAL | Age: 45
End: 2025-08-28
Payer: COMMERCIAL

## 2025-08-29 ENCOUNTER — APPOINTMENT (OUTPATIENT)
Dept: HEMATOLOGY/ONCOLOGY | Facility: HOSPITAL | Age: 45
End: 2025-08-29
Payer: COMMERCIAL

## 2025-09-04 ENCOUNTER — INFUSION (OUTPATIENT)
Dept: HEMATOLOGY/ONCOLOGY | Facility: HOSPITAL | Age: 45
End: 2025-09-04
Payer: COMMERCIAL

## 2025-09-04 VITALS
RESPIRATION RATE: 16 BRPM | OXYGEN SATURATION: 99 % | SYSTOLIC BLOOD PRESSURE: 137 MMHG | DIASTOLIC BLOOD PRESSURE: 84 MMHG | HEART RATE: 72 BPM

## 2025-09-04 DIAGNOSIS — C7A.8 NEUROENDOCRINE CANCER: ICD-10-CM

## 2025-09-04 PROCEDURE — 96372 THER/PROPH/DIAG INJ SC/IM: CPT

## 2025-09-04 PROCEDURE — 2500000004 HC RX 250 GENERAL PHARMACY W/ HCPCS (ALT 636 FOR OP/ED): Mod: JZ,TB | Performed by: INTERNAL MEDICINE

## 2025-09-04 RX ORDER — FAMOTIDINE 10 MG/ML
20 INJECTION, SOLUTION INTRAVENOUS ONCE AS NEEDED
OUTPATIENT
Start: 2025-10-02

## 2025-09-04 RX ORDER — OCTREOTIDE ACETATE,MI-SPHERES 30 MG
30 VIAL (EA) INTRAMUSCULAR ONCE
OUTPATIENT
Start: 2025-10-02

## 2025-09-04 RX ORDER — ALBUTEROL SULFATE 0.83 MG/ML
3 SOLUTION RESPIRATORY (INHALATION) AS NEEDED
OUTPATIENT
Start: 2025-10-02

## 2025-09-04 RX ORDER — DIPHENHYDRAMINE HYDROCHLORIDE 50 MG/ML
50 INJECTION, SOLUTION INTRAMUSCULAR; INTRAVENOUS AS NEEDED
OUTPATIENT
Start: 2025-10-02

## 2025-09-04 RX ORDER — OCTREOTIDE ACETATE,MI-SPHERES 30 MG
30 VIAL (EA) INTRAMUSCULAR ONCE
Status: COMPLETED | OUTPATIENT
Start: 2025-09-04 | End: 2025-09-04

## 2025-09-04 RX ORDER — EPINEPHRINE 0.3 MG/.3ML
0.3 INJECTION SUBCUTANEOUS EVERY 5 MIN PRN
OUTPATIENT
Start: 2025-10-02

## 2025-09-04 RX ADMIN — OCTREOTIDE ACETATE 30 MG: KIT at 15:00

## 2025-09-04 ASSESSMENT — PAIN SCALES - GENERAL: PAINLEVEL_OUTOF10: 2

## 2025-09-04 ASSESSMENT — ENCOUNTER SYMPTOMS
DEPRESSION: 0
LOSS OF SENSATION IN FEET: 0
OCCASIONAL FEELINGS OF UNSTEADINESS: 0

## 2025-09-04 ASSESSMENT — PATIENT HEALTH QUESTIONNAIRE - PHQ9
2. FEELING DOWN, DEPRESSED OR HOPELESS: NOT AT ALL
SUM OF ALL RESPONSES TO PHQ9 QUESTIONS 1 & 2: 0
1. LITTLE INTEREST OR PLEASURE IN DOING THINGS: NOT AT ALL

## 2025-10-02 ENCOUNTER — APPOINTMENT (OUTPATIENT)
Dept: HEMATOLOGY/ONCOLOGY | Facility: HOSPITAL | Age: 45
End: 2025-10-02
Payer: COMMERCIAL